# Patient Record
Sex: FEMALE | Race: BLACK OR AFRICAN AMERICAN | NOT HISPANIC OR LATINO | Employment: FULL TIME | URBAN - METROPOLITAN AREA
[De-identification: names, ages, dates, MRNs, and addresses within clinical notes are randomized per-mention and may not be internally consistent; named-entity substitution may affect disease eponyms.]

---

## 2018-04-12 ENCOUNTER — HISTORICAL (OUTPATIENT)
Dept: INTERNAL MEDICINE | Facility: CLINIC | Age: 48
End: 2018-04-12

## 2018-04-12 LAB
ABS NEUT (OLG): 3.93 X10(3)/MCL (ref 2.1–9.2)
ALBUMIN SERPL-MCNC: 3.9 GM/DL (ref 3.4–5)
ALBUMIN/GLOB SERPL: 1 RATIO (ref 1–2)
ALP SERPL-CCNC: 72 UNIT/L (ref 45–117)
ALT SERPL-CCNC: 13 UNIT/L (ref 12–78)
APPEARANCE, UA: ABNORMAL
AST SERPL-CCNC: 16 UNIT/L (ref 15–37)
BACTERIA #/AREA URNS AUTO: ABNORMAL /[HPF]
BASOPHILS # BLD AUTO: 0.03 X10(3)/MCL
BASOPHILS NFR BLD AUTO: 0 %
BILIRUB SERPL-MCNC: 0.3 MG/DL (ref 0.2–1)
BILIRUB UR QL STRIP: NEGATIVE
BILIRUBIN DIRECT+TOT PNL SERPL-MCNC: 0.1 MG/DL
BILIRUBIN DIRECT+TOT PNL SERPL-MCNC: 0.2 MG/DL
BUN SERPL-MCNC: 11 MG/DL (ref 7–18)
CALCIUM SERPL-MCNC: 9 MG/DL (ref 8.5–10.1)
CHLORIDE SERPL-SCNC: 104 MMOL/L (ref 98–107)
CHOLEST SERPL-MCNC: 161 MG/DL
CHOLEST/HDLC SERPL: 3.1 {RATIO} (ref 0–4.4)
CO2 SERPL-SCNC: 28 MMOL/L (ref 21–32)
COLOR UR: YELLOW
CREAT SERPL-MCNC: 0.7 MG/DL (ref 0.6–1.3)
EOSINOPHIL # BLD AUTO: 0.17 X10(3)/MCL
EOSINOPHIL NFR BLD AUTO: 2 %
ERYTHROCYTE [DISTWIDTH] IN BLOOD BY AUTOMATED COUNT: 19.9 % (ref 11.5–14.5)
EST. AVERAGE GLUCOSE BLD GHB EST-MCNC: 117 MG/DL
GLOBULIN SER-MCNC: 4.8 GM/ML (ref 2.3–3.5)
GLUCOSE (UA): NORMAL
GLUCOSE SERPL-MCNC: 102 MG/DL (ref 74–106)
HAV IGM SERPL QL IA: NONREACTIVE
HBA1C MFR BLD: 5.7 % (ref 4.2–6.3)
HBV CORE IGM SERPL QL IA: NONREACTIVE
HBV SURFACE AG SERPL QL IA: NEGATIVE
HCT VFR BLD AUTO: 31.9 % (ref 35–46)
HCV AB SERPL QL IA: NONREACTIVE
HDLC SERPL-MCNC: 52 MG/DL
HGB BLD-MCNC: 10 GM/DL (ref 12–16)
HGB UR QL STRIP: 0.06 MG/DL
HIV 1+2 AB+HIV1 P24 AG SERPL QL IA: NONREACTIVE
HYALINE CASTS #/AREA URNS LPF: ABNORMAL /[LPF]
IMM GRANULOCYTES # BLD AUTO: 0.03 10*3/UL
IMM GRANULOCYTES NFR BLD AUTO: 0 %
KETONES UR QL STRIP: NEGATIVE
LDLC SERPL CALC-MCNC: 95 MG/DL (ref 0–130)
LEUKOCYTE ESTERASE UR QL STRIP: 500 LEU/UL
LYMPHOCYTES # BLD AUTO: 2.12 X10(3)/MCL
LYMPHOCYTES NFR BLD AUTO: 31 % (ref 13–40)
MCH RBC QN AUTO: 23.7 PG (ref 26–34)
MCHC RBC AUTO-ENTMCNC: 31.3 GM/DL (ref 31–37)
MCV RBC AUTO: 75.6 FL (ref 80–100)
MONOCYTES # BLD AUTO: 0.57 X10(3)/MCL
MONOCYTES NFR BLD AUTO: 8 % (ref 4–12)
NEUTROPHILS # BLD AUTO: 3.93 X10(3)/MCL
NEUTROPHILS NFR BLD AUTO: 58 X10(3)/MCL
NITRITE UR QL STRIP: NEGATIVE
PH UR STRIP: 6 [PH] (ref 4.5–8)
PLATELET # BLD AUTO: 458 X10(3)/MCL (ref 130–400)
PMV BLD AUTO: 8.8 FL (ref 7.4–10.4)
POTASSIUM SERPL-SCNC: 3.8 MMOL/L (ref 3.5–5.1)
PROT SERPL-MCNC: 8.7 GM/DL (ref 6.4–8.2)
PROT UR QL STRIP: 10 MG/DL
RBC # BLD AUTO: 4.22 X10(6)/MCL (ref 4–5.2)
RBC #/AREA URNS AUTO: ABNORMAL /[HPF]
SODIUM SERPL-SCNC: 137 MMOL/L (ref 136–145)
SP GR UR STRIP: 1.01 (ref 1–1.03)
SQUAMOUS #/AREA URNS LPF: >100 /[LPF]
TRIGL SERPL-MCNC: 70 MG/DL
TSH SERPL-ACNC: 1.58 MIU/L (ref 0.36–3.74)
UROBILINOGEN UR STRIP-ACNC: NORMAL
VLDLC SERPL CALC-MCNC: 14 MG/DL
WBC # SPEC AUTO: 6.8 X10(3)/MCL (ref 4.5–11)
WBC #/AREA URNS AUTO: ABNORMAL /HPF

## 2018-04-19 ENCOUNTER — HISTORICAL (OUTPATIENT)
Dept: INTERNAL MEDICINE | Facility: CLINIC | Age: 48
End: 2018-04-19

## 2018-04-26 ENCOUNTER — HISTORICAL (OUTPATIENT)
Dept: INTERNAL MEDICINE | Facility: CLINIC | Age: 48
End: 2018-04-26

## 2018-06-27 ENCOUNTER — HISTORICAL (OUTPATIENT)
Dept: ADMINISTRATIVE | Facility: HOSPITAL | Age: 48
End: 2018-06-27

## 2018-06-27 LAB
ABS NEUT (OLG): 3.83 X10(3)/MCL (ref 2.1–9.2)
APPEARANCE, UA: ABNORMAL
BACTERIA #/AREA URNS AUTO: ABNORMAL /[HPF]
BASOPHILS # BLD AUTO: 0.02 X10(3)/MCL
BASOPHILS NFR BLD AUTO: 0 %
BILIRUB UR QL STRIP: NEGATIVE
COLOR UR: YELLOW
EOSINOPHIL # BLD AUTO: 0.13 X10(3)/MCL
EOSINOPHIL NFR BLD AUTO: 2 %
ERYTHROCYTE [DISTWIDTH] IN BLOOD BY AUTOMATED COUNT: 17.7 % (ref 11.5–14.5)
FERRITIN SERPL-MCNC: 24.1 NG/ML (ref 10–150)
GLUCOSE (UA): NORMAL
HCT VFR BLD AUTO: 33.9 % (ref 35–46)
HGB BLD-MCNC: 10.8 GM/DL (ref 12–16)
HGB UR QL STRIP: 0.2 MG/DL
HYALINE CASTS #/AREA URNS LPF: ABNORMAL /[LPF]
IMM GRANULOCYTES # BLD AUTO: 0.02 10*3/UL
IMM GRANULOCYTES NFR BLD AUTO: 0 %
IRON SATN MFR SERPL: 8.6 % (ref 15–50)
IRON SERPL-MCNC: 28 MCG/DL (ref 50–170)
KETONES UR QL STRIP: NEGATIVE
LEUKOCYTE ESTERASE UR QL STRIP: NEGATIVE
LYMPHOCYTES # BLD AUTO: 1.86 X10(3)/MCL
LYMPHOCYTES NFR BLD AUTO: 29 % (ref 13–40)
MCH RBC QN AUTO: 26.3 PG (ref 26–34)
MCHC RBC AUTO-ENTMCNC: 31.9 GM/DL (ref 31–37)
MCV RBC AUTO: 82.5 FL (ref 80–100)
MONOCYTES # BLD AUTO: 0.64 X10(3)/MCL
MONOCYTES NFR BLD AUTO: 10 % (ref 4–12)
NEUTROPHILS # BLD AUTO: 3.83 X10(3)/MCL
NEUTROPHILS NFR BLD AUTO: 59 X10(3)/MCL
NITRITE UR QL STRIP: NEGATIVE
PH UR STRIP: 5.5 [PH] (ref 4.5–8)
PLATELET # BLD AUTO: 396 X10(3)/MCL (ref 130–400)
PMV BLD AUTO: 9.1 FL (ref 7.4–10.4)
PROT SERPL-MCNC: 7.5 GM/DL
PROT UR QL STRIP: 20 MG/DL
RBC # BLD AUTO: 4.11 X10(6)/MCL (ref 4–5.2)
RBC #/AREA URNS AUTO: ABNORMAL /[HPF]
SP GR UR STRIP: 1.02 (ref 1–1.03)
SQUAMOUS #/AREA URNS LPF: >100 /[LPF]
TIBC SERPL-MCNC: 325 MCG/DL (ref 250–450)
TRANSFERRIN SERPL-MCNC: 259 MG/DL (ref 200–360)
UROBILINOGEN UR STRIP-ACNC: 2 MG/DL
WBC # SPEC AUTO: 6.5 X10(3)/MCL (ref 4.5–11)
WBC #/AREA URNS AUTO: ABNORMAL /HPF

## 2018-07-11 ENCOUNTER — HISTORICAL (OUTPATIENT)
Dept: RADIOLOGY | Facility: HOSPITAL | Age: 48
End: 2018-07-11

## 2018-07-11 LAB
BUN SERPL-MCNC: 10 MG/DL (ref 7–18)
CALCIUM SERPL-MCNC: 8.8 MG/DL (ref 8.5–10.1)
CHLORIDE SERPL-SCNC: 104 MMOL/L (ref 98–107)
CO2 SERPL-SCNC: 27 MMOL/L (ref 21–32)
CREAT SERPL-MCNC: 0.6 MG/DL (ref 0.6–1.3)
CREAT/UREA NIT SERPL: 17
GLUCOSE SERPL-MCNC: 119 MG/DL (ref 74–106)
POTASSIUM SERPL-SCNC: 3.6 MMOL/L (ref 3.5–5.1)
SODIUM SERPL-SCNC: 140 MMOL/L (ref 136–145)

## 2018-09-27 ENCOUNTER — HISTORICAL (OUTPATIENT)
Dept: ADMINISTRATIVE | Facility: HOSPITAL | Age: 48
End: 2018-09-27

## 2018-09-27 LAB
ABS NEUT (OLG): 4 X10(3)/MCL (ref 2.1–9.2)
ALBUMIN SERPL-MCNC: 3.8 GM/DL (ref 3.4–5)
ALBUMIN/GLOB SERPL: 1 RATIO (ref 1–2)
ALP SERPL-CCNC: 68 UNIT/L (ref 45–117)
ALT SERPL-CCNC: 14 UNIT/L (ref 12–78)
APPEARANCE, UA: CLEAR
AST SERPL-CCNC: 14 UNIT/L (ref 15–37)
BACTERIA #/AREA URNS AUTO: ABNORMAL /[HPF]
BASOPHILS # BLD AUTO: 0.02 X10(3)/MCL
BASOPHILS NFR BLD AUTO: 0 %
BILIRUB SERPL-MCNC: 0.3 MG/DL (ref 0.2–1)
BILIRUB UR QL STRIP: NEGATIVE
BILIRUBIN DIRECT+TOT PNL SERPL-MCNC: 0.1 MG/DL
BILIRUBIN DIRECT+TOT PNL SERPL-MCNC: 0.2 MG/DL
BUN SERPL-MCNC: 8 MG/DL (ref 7–18)
CALCIUM SERPL-MCNC: 8.8 MG/DL (ref 8.5–10.1)
CHLORIDE SERPL-SCNC: 105 MMOL/L (ref 98–107)
CO2 SERPL-SCNC: 28 MMOL/L (ref 21–32)
COLOR UR: YELLOW
CREAT SERPL-MCNC: 0.6 MG/DL (ref 0.6–1.3)
CREAT UR-MCNC: 307 MG/DL
EOSINOPHIL # BLD AUTO: 0.1 X10(3)/MCL
EOSINOPHIL NFR BLD AUTO: 2 %
ERYTHROCYTE [DISTWIDTH] IN BLOOD BY AUTOMATED COUNT: 15 % (ref 11.5–14.5)
GLOBULIN SER-MCNC: 4.4 GM/ML (ref 2.3–3.5)
GLUCOSE (UA): NORMAL
GLUCOSE SERPL-MCNC: 104 MG/DL (ref 74–106)
HCT VFR BLD AUTO: 33.7 % (ref 35–46)
HGB BLD-MCNC: 11.1 GM/DL (ref 12–16)
HGB UR QL STRIP: >1 MG/DL
HYALINE CASTS #/AREA URNS LPF: ABNORMAL /[LPF]
IMM GRANULOCYTES # BLD AUTO: 0.02 10*3/UL
IMM GRANULOCYTES NFR BLD AUTO: 0 %
KETONES UR QL STRIP: ABNORMAL
LEUKOCYTE ESTERASE UR QL STRIP: NEGATIVE
LYMPHOCYTES # BLD AUTO: 1.69 X10(3)/MCL
LYMPHOCYTES NFR BLD AUTO: 26 % (ref 13–40)
MCH RBC QN AUTO: 27 PG (ref 26–34)
MCHC RBC AUTO-ENTMCNC: 32.9 GM/DL (ref 31–37)
MCV RBC AUTO: 82 FL (ref 80–100)
MICROALBUMIN UR-MCNC: 28 MG/L (ref 0–19)
MICROALBUMIN/CREAT RATIO PNL UR: 9.1 MCG/MG CR (ref 0–29)
MONOCYTES # BLD AUTO: 0.61 X10(3)/MCL
MONOCYTES NFR BLD AUTO: 10 % (ref 4–12)
MUCOUS THREADS URNS QL MICRO: ABNORMAL
NEUTROPHILS # BLD AUTO: 4 X10(3)/MCL
NEUTROPHILS NFR BLD AUTO: 62 X10(3)/MCL
NITRITE UR QL STRIP: NEGATIVE
PH UR STRIP: 6 [PH] (ref 4.5–8)
PLATELET # BLD AUTO: 467 X10(3)/MCL (ref 130–400)
PMV BLD AUTO: 9.3 FL (ref 7.4–10.4)
POTASSIUM SERPL-SCNC: 3.4 MMOL/L (ref 3.5–5.1)
PROT SERPL-MCNC: 8.2 GM/DL (ref 6.4–8.2)
PROT UR QL STRIP: 30 MG/DL
RBC # BLD AUTO: 4.11 X10(6)/MCL (ref 4–5.2)
RBC #/AREA URNS AUTO: >=100 /[HPF]
SODIUM SERPL-SCNC: 140 MMOL/L (ref 136–145)
SP GR UR STRIP: 1.02 (ref 1–1.03)
SQUAMOUS #/AREA URNS LPF: >100 /[LPF]
UROBILINOGEN UR STRIP-ACNC: 3 MG/DL
WBC # SPEC AUTO: 6.4 X10(3)/MCL (ref 4.5–11)
WBC #/AREA URNS AUTO: ABNORMAL /HPF

## 2018-10-08 ENCOUNTER — HISTORICAL (OUTPATIENT)
Dept: RADIOLOGY | Facility: HOSPITAL | Age: 48
End: 2018-10-08

## 2018-10-08 LAB
APPEARANCE, UA: CLEAR
BACTERIA #/AREA URNS AUTO: ABNORMAL /[HPF]
BILIRUB UR QL STRIP: NEGATIVE
COLOR UR: COLORLESS
GLUCOSE (UA): NORMAL
HGB UR QL STRIP: NEGATIVE
HYALINE CASTS #/AREA URNS LPF: ABNORMAL /[LPF]
KETONES UR QL STRIP: NEGATIVE
LEUKOCYTE ESTERASE UR QL STRIP: NEGATIVE
NITRITE UR QL STRIP: NEGATIVE
PH UR STRIP: 7 [PH] (ref 4.5–8)
PROT UR QL STRIP: NEGATIVE
RBC #/AREA URNS AUTO: ABNORMAL /[HPF]
SP GR UR STRIP: 1 (ref 1–1.03)
SQUAMOUS #/AREA URNS LPF: ABNORMAL /[LPF]
UROBILINOGEN UR STRIP-ACNC: NORMAL
WBC #/AREA URNS AUTO: ABNORMAL /HPF

## 2019-02-11 LAB
HIGH RISK HPV 16 (PRECISION): NEGATIVE
HIGH RISK HPV 18/45 (PRECISION): NEGATIVE
PAP RECOMMENDATION EXT: NORMAL
PAP SMEAR: NORMAL

## 2019-04-24 ENCOUNTER — HISTORICAL (OUTPATIENT)
Dept: INTERNAL MEDICINE | Facility: CLINIC | Age: 49
End: 2019-04-24

## 2019-04-24 ENCOUNTER — HISTORICAL (OUTPATIENT)
Dept: ADMINISTRATIVE | Facility: HOSPITAL | Age: 49
End: 2019-04-24

## 2019-05-07 ENCOUNTER — HISTORICAL (OUTPATIENT)
Dept: RADIOLOGY | Facility: HOSPITAL | Age: 49
End: 2019-05-07

## 2019-08-08 ENCOUNTER — HISTORICAL (OUTPATIENT)
Dept: INTERNAL MEDICINE | Facility: CLINIC | Age: 49
End: 2019-08-08

## 2020-01-07 ENCOUNTER — HISTORICAL (OUTPATIENT)
Dept: ADMINISTRATIVE | Facility: HOSPITAL | Age: 50
End: 2020-01-07

## 2020-10-27 ENCOUNTER — HISTORICAL (OUTPATIENT)
Dept: RADIOLOGY | Facility: HOSPITAL | Age: 50
End: 2020-10-27

## 2020-11-10 ENCOUNTER — HISTORICAL (OUTPATIENT)
Dept: INTERNAL MEDICINE | Facility: CLINIC | Age: 50
End: 2020-11-10

## 2021-01-13 ENCOUNTER — HISTORICAL (OUTPATIENT)
Dept: RADIOLOGY | Facility: HOSPITAL | Age: 51
End: 2021-01-13

## 2021-08-09 ENCOUNTER — HISTORICAL (OUTPATIENT)
Dept: INTERNAL MEDICINE | Facility: CLINIC | Age: 51
End: 2021-08-09

## 2021-08-11 ENCOUNTER — HISTORICAL (OUTPATIENT)
Dept: INTERNAL MEDICINE | Facility: CLINIC | Age: 51
End: 2021-08-11

## 2021-08-27 ENCOUNTER — HISTORICAL (OUTPATIENT)
Dept: RADIOLOGY | Facility: HOSPITAL | Age: 51
End: 2021-08-27

## 2021-12-29 ENCOUNTER — HISTORICAL (OUTPATIENT)
Dept: ADMINISTRATIVE | Facility: HOSPITAL | Age: 51
End: 2021-12-29

## 2022-01-19 ENCOUNTER — HISTORICAL (OUTPATIENT)
Dept: RADIOLOGY | Facility: HOSPITAL | Age: 52
End: 2022-01-19

## 2022-01-28 ENCOUNTER — HISTORICAL (OUTPATIENT)
Dept: ADMINISTRATIVE | Facility: HOSPITAL | Age: 52
End: 2022-01-28

## 2022-01-28 ENCOUNTER — HISTORICAL (OUTPATIENT)
Dept: RADIOLOGY | Facility: HOSPITAL | Age: 52
End: 2022-01-28

## 2022-03-14 ENCOUNTER — OFFICE VISIT (OUTPATIENT)
Dept: NEUROSURGERY | Facility: CLINIC | Age: 52
End: 2022-03-14
Payer: MEDICAID

## 2022-03-14 VITALS
TEMPERATURE: 98 F | HEIGHT: 62 IN | WEIGHT: 179.13 LBS | DIASTOLIC BLOOD PRESSURE: 84 MMHG | SYSTOLIC BLOOD PRESSURE: 132 MMHG | HEART RATE: 84 BPM | BODY MASS INDEX: 32.96 KG/M2

## 2022-03-14 DIAGNOSIS — M51.37 DDD (DEGENERATIVE DISC DISEASE), LUMBOSACRAL: ICD-10-CM

## 2022-03-14 DIAGNOSIS — M47.816 SPONDYLOSIS OF LUMBAR SPINE: ICD-10-CM

## 2022-03-14 DIAGNOSIS — M54.50 DORSALGIA OF LUMBAR REGION: Primary | ICD-10-CM

## 2022-03-14 PROCEDURE — 3008F BODY MASS INDEX DOCD: CPT | Mod: CPTII,,, | Performed by: STUDENT IN AN ORGANIZED HEALTH CARE EDUCATION/TRAINING PROGRAM

## 2022-03-14 PROCEDURE — 3075F PR MOST RECENT SYSTOLIC BLOOD PRESS GE 130-139MM HG: ICD-10-PCS | Mod: CPTII,,, | Performed by: STUDENT IN AN ORGANIZED HEALTH CARE EDUCATION/TRAINING PROGRAM

## 2022-03-14 PROCEDURE — 99999 PR PBB SHADOW E&M-EST. PATIENT-LVL III: ICD-10-PCS | Mod: PBBFAC,,, | Performed by: STUDENT IN AN ORGANIZED HEALTH CARE EDUCATION/TRAINING PROGRAM

## 2022-03-14 PROCEDURE — 1159F MED LIST DOCD IN RCRD: CPT | Mod: CPTII,,, | Performed by: STUDENT IN AN ORGANIZED HEALTH CARE EDUCATION/TRAINING PROGRAM

## 2022-03-14 PROCEDURE — 3008F PR BODY MASS INDEX (BMI) DOCUMENTED: ICD-10-PCS | Mod: CPTII,,, | Performed by: STUDENT IN AN ORGANIZED HEALTH CARE EDUCATION/TRAINING PROGRAM

## 2022-03-14 PROCEDURE — 1160F RVW MEDS BY RX/DR IN RCRD: CPT | Mod: CPTII,,, | Performed by: STUDENT IN AN ORGANIZED HEALTH CARE EDUCATION/TRAINING PROGRAM

## 2022-03-14 PROCEDURE — 3075F SYST BP GE 130 - 139MM HG: CPT | Mod: CPTII,,, | Performed by: STUDENT IN AN ORGANIZED HEALTH CARE EDUCATION/TRAINING PROGRAM

## 2022-03-14 PROCEDURE — 1160F PR REVIEW ALL MEDS BY PRESCRIBER/CLIN PHARMACIST DOCUMENTED: ICD-10-PCS | Mod: CPTII,,, | Performed by: STUDENT IN AN ORGANIZED HEALTH CARE EDUCATION/TRAINING PROGRAM

## 2022-03-14 PROCEDURE — 1159F PR MEDICATION LIST DOCUMENTED IN MEDICAL RECORD: ICD-10-PCS | Mod: CPTII,,, | Performed by: STUDENT IN AN ORGANIZED HEALTH CARE EDUCATION/TRAINING PROGRAM

## 2022-03-14 PROCEDURE — 3079F DIAST BP 80-89 MM HG: CPT | Mod: CPTII,,, | Performed by: STUDENT IN AN ORGANIZED HEALTH CARE EDUCATION/TRAINING PROGRAM

## 2022-03-14 PROCEDURE — 99203 PR OFFICE/OUTPT VISIT, NEW, LEVL III, 30-44 MIN: ICD-10-PCS | Mod: S$PBB,,, | Performed by: STUDENT IN AN ORGANIZED HEALTH CARE EDUCATION/TRAINING PROGRAM

## 2022-03-14 PROCEDURE — 99213 OFFICE O/P EST LOW 20 MIN: CPT | Mod: PBBFAC | Performed by: STUDENT IN AN ORGANIZED HEALTH CARE EDUCATION/TRAINING PROGRAM

## 2022-03-14 PROCEDURE — 99999 PR PBB SHADOW E&M-EST. PATIENT-LVL III: CPT | Mod: PBBFAC,,, | Performed by: STUDENT IN AN ORGANIZED HEALTH CARE EDUCATION/TRAINING PROGRAM

## 2022-03-14 PROCEDURE — 3079F PR MOST RECENT DIASTOLIC BLOOD PRESSURE 80-89 MM HG: ICD-10-PCS | Mod: CPTII,,, | Performed by: STUDENT IN AN ORGANIZED HEALTH CARE EDUCATION/TRAINING PROGRAM

## 2022-03-14 PROCEDURE — 99203 OFFICE O/P NEW LOW 30 MIN: CPT | Mod: S$PBB,,, | Performed by: STUDENT IN AN ORGANIZED HEALTH CARE EDUCATION/TRAINING PROGRAM

## 2022-03-14 RX ORDER — FERROUS SULFATE 325(65) MG
325 TABLET, DELAYED RELEASE (ENTERIC COATED) ORAL
COMMUNITY
Start: 2021-08-10 | End: 2022-05-10 | Stop reason: SDUPTHER

## 2022-03-14 RX ORDER — BUSPIRONE HYDROCHLORIDE 7.5 MG/1
7.5 TABLET ORAL
COMMUNITY
Start: 2022-02-10 | End: 2022-05-10

## 2022-03-14 RX ORDER — AMLODIPINE BESYLATE 5 MG/1
5 TABLET ORAL
COMMUNITY
Start: 2021-08-10 | End: 2022-08-10 | Stop reason: SDUPTHER

## 2022-03-14 RX ORDER — ACETAMINOPHEN 500 MG
50 TABLET ORAL
COMMUNITY
Start: 2021-08-10 | End: 2022-08-10 | Stop reason: SDUPTHER

## 2022-03-14 RX ORDER — METHOCARBAMOL 500 MG/1
1000 TABLET, FILM COATED ORAL 4 TIMES DAILY PRN
COMMUNITY
Start: 2022-02-10 | End: 2022-05-10 | Stop reason: SDUPTHER

## 2022-03-14 RX ORDER — MELOXICAM 15 MG/1
15 TABLET ORAL
COMMUNITY
Start: 2022-02-10 | End: 2022-05-10 | Stop reason: SDUPTHER

## 2022-03-14 RX ORDER — HYDROXYZINE PAMOATE 25 MG/1
25 CAPSULE ORAL 3 TIMES DAILY PRN
COMMUNITY
Start: 2022-03-08 | End: 2022-05-10 | Stop reason: SDUPTHER

## 2022-03-14 NOTE — PROGRESS NOTES
Neurosurgery  History & Physical    SUBJECTIVE:     Chief Complaint: progressive back pain    History of Present Illness:  Tova Stack is a 51 year old female with acute on chronic lower back pain that radiates to the right leg, sometimes to the knee and occasionally to the foot along the outside of the leg.  Sometimes feels the leg give out when walking. Sometimes can go days to weeks without feeling severe and sharp pain.  She does have pain in the back daily that is described as sore and aching.  Standing for hours and walking long distances exacerbate burning pain.  Sitting for long periods & lying down makes her feel achy & sore.  Also has bilateral knee & hip pain with prior right knee swelling and pain.  She has tried physical therapy with some benefit (completed 2 months ago).  Never seen a chiropractor or had injections.  Currently taking robaxin with minimal benefit and occasionally mobic.     Review of patient's allergies indicates:  No Known Allergies    Current Outpatient Medications   Medication Sig Dispense Refill    amLODIPine (NORVASC) 5 MG tablet Take 5 mg by mouth.      busPIRone (BUSPAR) 7.5 MG tablet Take 7.5 mg by mouth.      cholecalciferol, vitamin D3, (VITAMIN D3) 50 mcg (2,000 unit) Cap Take 50 mcg by mouth.      ferrous sulfate 325 (65 FE) MG EC tablet Take 325 mg by mouth.      meloxicam (MOBIC) 15 MG tablet Take 15 mg by mouth.      hydrOXYzine pamoate (VISTARIL) 25 MG Cap Take 25 mg by mouth 3 (three) times daily as needed.      methocarbamoL (ROBAXIN) 500 MG Tab Take 1,000 mg by mouth 4 (four) times daily as needed.       No current facility-administered medications for this visit.       Past Medical History:   Diagnosis Date    Hypertension      Past Surgical History:   Procedure Laterality Date     SECTION      4 total     Family History    None       Social History     Socioeconomic History    Marital status: Single   Tobacco Use    Smoking status: Former  "Smoker     Types: Cigarettes    Smokeless tobacco: Former User     Quit date: 1/1/2019   Substance and Sexual Activity    Alcohol use: Never    Drug use: Yes     Types: "Crack" cocaine     Comment: 1995    Sexual activity: Yes     Partners: Male       Review of Systems   Musculoskeletal: Positive for arthralgias, back pain and joint swelling.   All other systems reviewed and are negative.      OBJECTIVE:     Vital Signs  Temp: 97.6 °F (36.4 °C)  Pulse: 84  BP: 132/84  Pain Score:   7  Height: 5' 2" (157.5 cm)  Weight: 81.3 kg (179 lb 2 oz)  Body mass index is 32.76 kg/m².      Physical Exam:  Nursing note and vitals reviewed.    General: well developed, well nourished, no distress.   Pulmonary: no signs of respiratory distress, comfortable appearing on room air  Skin: Skin is warm, dry and intact.  Extremities: no edema, intact distal pulses    Neuro:  Mental Status: Alert and oriented. Oriented x 4  Language/language: No aphasia. No dysarthria.  Cranial nerves: PERRL, EOMI, face symmetric  Sensory: intact to light touch throughout  Motor Strength:  Strength  Deltoids Triceps Biceps Wrist Extension Wrist Flexion Hand    Upper: R 5/5 5/5 5/5 5/5 5/5 5/5    L 5/5 5/5 5/5 5/5 5/5 5/5     Iliopsoas Quadriceps Knee  Flexion Tibialis  anterior Gastro- cnemius EHL   Lower: R 5/5 5/5 5/5 5/5 5/5 5/5    L 5/5 5/5 5/5 5/5 5/5 5/5   Reflexes: clonus negative bilateral.  Gait stable, fluid. No difficulty with tandem gait: Able to walk on heels & toes  Spine: lumbar ROM limited with flexion & lateral bend. +TTP lumbar spine      Diagnostic Results:  Outside MRI was personally reviewed left lateral disc herniation at L5-S1 with some foraminal stenosis    ASSESSMENT/PLAN:   52 yo female with chronic low back pain radiating to the right leg.  No compressive pathology appreciated on imaging that would cause her current symptoms but will review again after imaging loaded into our system.  Pt will have CT L spine put on disc " and sent to me for review.  Also need outside radiology reports.  Will plan to follow up next Monday to review results.          Note dictated with voice recognition software, please excuse any grammatical errors.

## 2022-03-23 ENCOUNTER — HISTORICAL (OUTPATIENT)
Dept: RADIOLOGY | Facility: HOSPITAL | Age: 52
End: 2022-03-23

## 2022-04-07 ENCOUNTER — HISTORICAL (OUTPATIENT)
Dept: ADMINISTRATIVE | Facility: HOSPITAL | Age: 52
End: 2022-04-07
Payer: MEDICAID

## 2022-04-23 VITALS
WEIGHT: 176.13 LBS | OXYGEN SATURATION: 100 % | DIASTOLIC BLOOD PRESSURE: 82 MMHG | HEIGHT: 62 IN | SYSTOLIC BLOOD PRESSURE: 127 MMHG | BODY MASS INDEX: 32.41 KG/M2

## 2022-05-02 NOTE — HISTORICAL [facility] CERNER
This is a historical note converted from [facility]. Formatting and pictures may have been removed.  Please reference [facility] for original formatting and attached multimedia. Chief Complaint  follow up  History of Present Illness  9/27/19: Tova is a pleasant?50 yo AAF here for follow up.?Last visit 4/24/19.?Pmhx includes HTN, IGT, anemia, lumbago, anxiety, and tobacco abuse. Today she is c/o worsening back pain. Describes as throbbing,?sharp shooting pain to L side with L leg weakness. Aggravating factors: standing for > 5 hours, lying supine. Relieving factors: massage.?Denies stretching as discussed at prior visit. Tx with Ibuprofen 800 mg without relief, just causes her to sleep. /84. Not taking hydroxyzine every day. Using prn basis; can feel when anxiety comes on and takes medication with relief. Still worries about her son who is in penitentiary. She is caring for her 3 yo grandson and her other son who is 29 yo just moved to [place]. She has realized though that she cannot let it affect her like it has before. Work is a distraction for her. 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 Still smoking, weaning down to 10 cigarettes/d. No other concerns.  ?  1/7/20: Patient here for 3 mo f/u for lumbago. Did not complete PT as ordered since last visit. She states she did not have a chance to do so. Today c/o R elbow pain. Ongoing x 1 month.?When pain?at?worst, endorses?cramp in R hand and has to pry?her hands open. Pain worse when picking up objects. R handed. Denies any injury/ trauma. Out of diclofenac. Not tx with OTC medications. Also c/o cough, productive at times with various colors of sputum from clear, yellow and green. Denies any SOB, CP, wheezing, rhinorrhea, ear pain, HA, sore throat, congestion.?Of note, recent?ED visit on 12/29/19 and 1/2/20 for cough and flu like symptoms. Provided with script for Tamiflu on 12/29/19 which she completed. Did not take cough syrup as  prescribed d/t fearful of s/e of drowsiness. Last fever > 1 week ago.  hospitalized at this time for COPD and flu. Patient wearing mask today. Would like prescription for BP monitor. Completed labs this morning for anemia; pending results.?No other concerns.  Review of Systems  Constitutional: negative except as stated in HPI  Eye: negative except as stated in HPI  ENMT: negative except as stated in HPI  Respiratory: negative except as stated in HPI  Cardiovascular: negative except as stated in HPI  Gastrointestinal: negative except as stated in HPI  Genitourinary: negative except as stated in HPI  Hema/Lymph: negative except as stated in HPI  Endocrine: negative except as stated in HPI  Immunologic: negative except as stated in HPI  Musculoskeletal: negative except as stated in HPI  Integumentary: negative except as stated in HPI  Neurologic: negative except as stated in HPI  ?   All Other ROS_ ?negative except as stated in HPI  Physical Exam  Vitals & Measurements  T:?36.9? ?C (Oral)? HR:?65(Peripheral)? RR:?16? BP:?124/76?  HT:?157.48?cm? WT:?71.8?kg? BMI:?28.95? LMP:?11/10/2019 00:00 CST?  General: Alert and oriented. Well dressed. No acute distress.  HENT: Normocephalic. BTM intact without effusion, exudate, erythema. O/p pink and moist without exudate, erythema or ulcer; negative tonsillar hypertrophy. Nasal septum midline without edema, drainage. Negative sinus tenderness.  Neck: Supple. No LAD or tenderness.  Respiratory: Lungs are clear to auscultation, Respirations are non-labored, Breath sounds are equal, Symmetrical chest wall expansion.  Cardiovascular: Normal rate, Regular rhythm, No murmur.  Musculoskeletal:?R shoulder without palpable tenderness; FROM intact. R elbow tenderness with palpation of lateral epicondyle with minimal edema. Positive open can test. Painful adduction. R hand FROM intact without edema or tenderness.  Integumentary: Warm, Dry, Intact.  Neurologic: No focal  deficits.  Psychiatric: Calm. Appropriate mood and affect.?Judgment intact with clear thought processes.  Assessment/Plan  1.?Cough?R05  treated for flu like illness x 1 week ago, completed Tamiflu ( diagnosed with the flu and inpatient for COPD/ Flu at this time)  afebrile > 1 week  continues with c/o cough  will obtain CXR today  can treat with cough syrup as directed  any worsening s/s, notify provider  Ordered:  XR Chest 2 Views, Routine, *Est. 01/07/20 3:00:00 CST, Cough, None, Ambulatory, Rad Type, Order for future visit, Cough, Not Scheduled, *Est. 01/07/20 3:00:00 CST  ?  2.?Tendinitis of elbow?M77.8  ongoing x 1 month; denies any injury/ trauma  R handed  rest for about 1 week, apply?ice and compression; can obtain OTC tennis elbow splint to apply to area  will treat with Medrol dose lillie as directed and diclofenac 50 mg BID prn pain relief, with food and avoid other NSAIDs  take with OTC Pepcid while on oral steroids and NSAIDs  Ordered:  diclofenac, 50 mg = 1 tab(s), Oral, BID, PRN PRN as needed for pain, with food; avoid other NSAIDs, # 28 tab(s), 0 Refill(s), Pharmacy: [id number], 157.48, cm, Height/Length Dosing, 01/07/20 9:37:00 CST, 71.8, kg, Weight Dosing, 01/07/20 9:37:00 CST  famotidine, 20 mg = 1 tab(s), Oral, BID, avoid eating and drinking for 10 minutes after each dose, # 60 tab(s), 0 Refill(s), Pharmacy: [id number], 157.48, cm, Height/Length Dosing, 01/07/20 9:37:00 CST, 71.8, kg, Weight Dosing, 01/07/20 9:37:00 CST  methylPREDNISolone, = 1 packet(s), Oral, As Directed, as directed on package labeling, X 6 day(s), # 21 tab(s), 0 Refill(s), Pharmacy: [id number], 157.48, cm, Height/Length Dosing, 01/07/20 9:37:00 CST, 71.8, kg, Weight Dosing, 01/07/20 9:37:00 CST  ?  3.?Lumbago?M54.5  9/27/19  Encouraged?daily stretching?and exercise, hand out provided today and encouraged to engage in 2-3 x d  Encouraged?ice/ heat therapy as tolerated for pain relief  Rx Ibuprofen  causes s/e of sleepiness?  XR lumbar spine 4/24/19: ?mild degenerative narrowing of the L5-S1 disc space and small anterior hypertrophic spurring.?There is also mild facets arthropathy.  External Referral for PT at location of patients choice  Rx Baclofen 10 mg (0.5 tab) TID prn muscle spasm/ pain  needs labs prior to prescribing anti-inflammatory for pain relief  if worsening s/s, notify provider for sooner appt  RTC 3 mo  ?  1/7/20  no change; did not engage in PT as ordered at last visit  Ordered:  Clinic Follow up, *Est. 07/07/20 9:20:00 CDT, Order for future visit, [facility]  ?  4.?Anemia?D64.9  labs completed this morning and returned after patient discharged with improvement  1/7/20: RBC 4.22, H/H 10.4/33.4, Plt 413, MCV 79.1, MCH 24.6, RDW 18; Iron profile with iron level 30, transferrin 307, TIBC 373, iron sat 8, ferritin 14.1  prior labs 8/8/19: Ferritin 11; 9/27/19 H/H 9.8/31.5  Ordered:  CBC w/ Auto Diff, Routine collect, *Est. 07/07/20 3:00:00 CDT, Blood, Order for future visit, *Est. Stop date 07/07/20 3:00:00 CDT, Lab Collect, Anemia, 01/07/20 10:03:00 CST  Clinic Follow up, *Est. 07/07/20 9:20:00 CDT, Order for future visit, [facility]  ?  Orders:  Misc Prescription, Automatic Blood Pressure Monitor, See Instructions, Use Daily to check blood pressure. Keep log Dx: I10, # 1 EA, 0 Refill(s), Pharmacy: [id number], 157.48, cm, Height/Length Dosing, 01/07/20 9:37:00 CST, 71.8, kg, Weight Dosing, 01/07/20 9:37...  RTC in 6 mo with labs prior to appt  If at any time condition worsens or experience new symptoms/ concerns, please call clinic for sooner appointment or go to ED/UCC.  Referrals  Clinic Follow up, *Est. 07/07/20 9:20:00 CDT, Order for future visit, [facility]   Problem List/Past Medical History  Ongoing  Anemia  Anxiety  Hematuria  HTN - Hypertension  Lumbago  Obesity  Tobacco  user  Historical  Pregnant  Pregnant  Pregnant  Pregnant  Procedure/Surgical History  bilateral tubal ligation  C SEC X 4   Medications  amlodipine 5 mg oral tablet, 5 mg= 1 tab(s), Oral, Daily, 1 refills  Automatic Blood Pressure Monitor, See Instructions  baclofen 10 mg oral tablet, 10 mg= 1 tab(s), Oral, TID,? ?Not Taking, Completed Rx: Last Dose Date/Time unknown  diclofenac sodium 50 mg oral delayed release tablet, 50 mg= 1 tab(s), Oral, BID, PRN  famotidine 20 mg oral tablet, 20 mg= 1 tab(s), Oral, BID  ferrous sulfate 325 mg (65 mg elemental iron) oral delayed release tablet, 325 mg= 1 tab(s), Oral, TID, 1 refills,? ?Still taking, not as prescribed: Last Dose Date/Time Unknown  Medrol Dosepak 4 mg oral tablet, 1 packet(s), Oral, As Directed  Promethazine DM 6.25 mg-15 mg/5 mL oral syrup, 5 mL, Oral, q6hr, PRN  Vistaril 25 mg oral capsule, 25 mg= 1 cap(s), Oral, QID, PRN  Zofran ODT 4 mg oral tablet, disintegrating, 4 mg= 1 tab(s), Oral, q8hr, PRN  Allergies  No Known Allergies  Social History  Abuse/Neglect  No, 01/07/2020  Alcohol - Denies Alcohol Use, 09/28/2014  Past, 10/02/2019  Never, 09/15/2017  Employment/School  Unemployed, Highest education level: High school., 03/26/2018  Exercise  Exercise duration: 60. Exercise frequency: 3-4 times/week. Exercise type: Walking., 12/12/2018  Home/Environment  Lives with Spouse, grand baby. Living situation: Home/Independent. Alcohol abuse in household: No. Substance abuse in household: No. Smoker in household: Yes. Feels unsafe at home: No. Safe place to go: Yes., 03/26/2018  Nutrition/Health  Regular, Good, 10/02/2019  Regular, 03/26/2018  Sexual  Sexually active: Yes. Sexually active at age 14 Years. Number of current partners 1. Number of lifetime partners 10. Sexual orientation: Heterosexual. Uses condoms: No. History of sexual abuse: No., 12/12/2018  Spiritual/Cultural  Jehovah's witness, 10/02/2019  Substance Use - Denies Substance Abuse, 09/28/2014  Never,  10/02/2019  Never, 09/15/2017  Tobacco - High Risk, 09/28/2014  Former smoker, quit more than 30 days ago, Cigarettes, N/A, 01/07/2020  Family History  Diabetes: Mother.  HEART DISEASES: Sister.  Hypertension.: Mother, Father and Sister.  Health Maintenance  Health Maintenance  ???Pending?(in the next year)  ??? ??OverDue  ??? ? ? ?Diabetes Screening due??and every?  ??? ? ? ?Smoking Cessation due??01/01/20??and every 1??year(s)  ??? ??Due?  ??? ? ? ?Alcohol Misuse Screening due??01/01/20??and every 1??year(s)  ??? ??Refused?  ??? ? ? ?Tetanus Vaccine due??01/07/20??and every 10??year(s)  ??? ??Due In Future?  ??? ? ? ?Hypertension Management-BMP not due until??09/26/20??and every 1??year(s)  ??? ? ? ?Obesity Screening not due until??01/01/21??and every 1??year(s)  ??? ? ? ?Blood Pressure Screening not due until??01/06/21??and every 1??year(s)  ??? ? ? ?Body Mass Index Check not due until??01/06/21??and every 1??year(s)  ??? ? ? ?Hypertension Management-Blood Pressure not due until??01/06/21??and every 1??year(s)  ???Satisfied?(in the past 1 year)  ??? ??Satisfied?  ??? ? ? ?ADL Screening on??01/07/20.??Satisfied by Lydia Schmitt LPN  ??? ? ? ?Blood Pressure Screening on??01/07/20.??Satisfied by Lydia Schmitt LPN  ??? ? ? ?Body Mass Index Check on??01/07/20.??Satisfied by Lydia Schmitt LPN  ??? ? ? ?Breast Cancer Screening on??05/07/19.??Satisfied by Lesly Christopher  ??? ? ? ?Cervical Cancer Screening on??02/11/19.??Satisfied by Jessica Hines  ??? ? ? ?Depression Screening on??10/02/19.??Satisfied by Ellen Wiley LPN  ??? ? ? ?Diabetes Screening on??09/27/19.??Satisfied by Yolanda Christopher  ??? ? ? ?Hypertension Management-Education on??01/07/20.??Satisfied by Shira Garcia  ??? ? ? ?Influenza Vaccine on??01/07/20.??Satisfied by Lydia Schmitt LPN  ??? ? ? ?Lipid Screening on??01/07/20.??Satisfied by Danielle Henriquez  ??? ? ? ?Obesity Screening on??01/07/20.??Satisfied by Lydia Schmitt LPN  F  ?  Lab Results  Test Name Test Result Date/Time   Iron Lvl 30 mcg/dL (Low) 01/07/2020 09:17 CST   Transferrin 307.0 mg/dL 01/07/2020 09:17 CST   TIBC 373 mcg/dL 01/07/2020 09:17 CST   Iron Sat 8.0 % (Low) 01/07/2020 09:17 CST   Ferritin Lvl 14.1 ng/mL 01/07/2020 09:17 CST   Ferritin Lvl 11.0 ng/mL 08/08/2019 09:39 CDT   Chol 154 mg/dL 01/07/2020 09:17 CST   HDL 44 mg/dL 01/07/2020 09:17 CST   Trig 87 mg/dL 01/07/2020 09:17 CST   LDL 93 mg/dL 01/07/2020 09:17 CST   Chol/HDL 3.5 01/07/2020 09:17 CST   VLDL 17 mg/dL 01/07/2020 09:17 CST   WBC 5.1 x10(3)/mcL 01/07/2020 09:17 CST   WBC 6.2 x10(3)/mcL 09/27/2019 10:13 CDT   RBC 4.22 x10(6)/mcL 01/07/2020 09:17 CST   RBC 3.99 x10(6)/mcL (Low) 09/27/2019 10:13 CDT   Hgb 10.4 gm/dL (Low) 01/07/2020 09:17 CST   Hgb 9.8 gm/dL (Low) 09/27/2019 10:13 CDT   Hct 33.4 % (Low) 01/07/2020 09:17 CST   Hct 31.5 % (Low) 09/27/2019 10:13 CDT   Platelet 413 x10(3)/mcL (High) 01/07/2020 09:17 CST   Platelet 471 x10(3)/mcL (High) 09/27/2019 10:13 CDT   MCV 79.1 fL (Low) 01/07/2020 09:17 CST   MCV 78.9 fL (Low) 09/27/2019 10:13 CDT   MCH 24.6 pg (Low) 01/07/2020 09:17 CST   MCH 24.6 pg (Low) 09/27/2019 10:13 CDT   MCHC 31.1 gm/dL 01/07/2020 09:17 CST   MCHC 31.1 gm/dL 09/27/2019 10:13 CDT   RDW 18.0 % (High) 01/07/2020 09:17 CST   RDW 17.8 % (High) 09/27/2019 10:13 CDT   MPV 9.4 fL 01/07/2020 09:17 CST   MPV 8.8 fL 09/27/2019 10:13 CDT   Abs Neut 2.38 x10(3)/mcL 01/07/2020 09:17 CST   Abs Neut 3.42 x10(3)/mcL 09/27/2019 10:13 CDT   Neutro Auto 47 % 01/07/2020 09:17 CST   Neutro Auto 55 % 09/27/2019 10:13 CDT   Lymph Auto 41 % 01/07/2020 09:17 CST   Lymph Auto 30 % 09/27/2019 10:13 CDT   Mono Auto 10 % 01/07/2020 09:17 CST   Mono Auto 11 % 09/27/2019 10:13 CDT   Eos Auto 2 % 01/07/2020 09:17 CST   Eos Auto 3 % 09/27/2019 10:13 CDT   Abs Eos 0.1 x10(3)/mcL 01/07/2020 09:17 CST   Abs Eos 0.2 x10(3)/mcL 09/27/2019 10:13 CDT   Basophil Auto 0 % 01/07/2020 09:17 CST   Basophil Auto 0 %  09/27/2019 10:13 CDT   Abs Neutro 2.38 x10(3)/mcL 01/07/2020 09:17 CST   Abs Neutro 3.42 x10(3)/mcL 09/27/2019 10:13 CDT   Abs Lymph 2.1 x10(3)/mcL 01/07/2020 09:17 CST   Abs Lymph 1.9 x10(3)/mcL 09/27/2019 10:13 CDT   Abs Mono 0.5 x10(3)/mcL 01/07/2020 09:17 CST   Abs Mono 0.7 x10(3)/mcL 09/27/2019 10:13 CDT   Abs Baso 0.0 x10(3)/mcL 01/07/2020 09:17 CST   Abs Baso 0.0 x10(3)/mcL 09/27/2019 10:13 CDT   IG% 0 % 01/07/2020 09:17 CST   IG% 0 % 09/27/2019 10:13 CDT   IG# 0.0200 01/07/2020 09:17 CST   IG# 0.0200 09/27/2019 10:13 CDT   Influ A PCR Negative UA 12/29/2019 14:26 CST   Influ B PCR Negative UA 12/29/2019 14:26 CST

## 2022-05-02 NOTE — HISTORICAL [facility] CERNER
This is a historical note converted from [employer]. Formatting and pictures may have been removed.  Please reference [employer] for original formatting and attached multimedia. Chief Complaint  Follow up Lower Left Back Pain  History of Present Illness  Tova is a 47 yo AAF presenting today for routine f/u. Former patient of SHAUNA Lara, last visit 6/27/18. Pmhx includes HTN, IGT, anemia, lumbago, anxiety, and tobacco abuse. Today she is c/o ongoing low back pain x 1 year. 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 Denies being seen after injury, reports?I wanted to keep my job and thought it would go?away.?10/10. Describes as sharp, shooting pain worse to L low back/ buttock.?Aggravating factors: standing and sitting for extended periods of time. Tx at home with Advil pm at bedtime without relief (but does help her sleep). Denies any stretching or?ice/ heat therapy. Asking for refill of anxiety medication.?States she has been emotional d/t going through a lot right now...?Further states her  recently left her (last Saturday) unexpectedly and her son has been in [facility] for a serious charge. Medication helps?anxiety; tx?once daily. Does engage in Restorationism?regularly.?Wt loss of 5# since last visit. Eating ~ 1 meal daily. Admits to decreased appetite s/t anxiety and stressors at home. BP at goal today. Compliant with medication as prescribed. Reviewed A1c 5.7% (+fhx: mom) and educated on ADA diet. Anemia tx with otc iron supplements. No other concerns.  Review of Systems  Constitutional: negative except as stated in HPI  Eye: negative except as stated in HPI  ENMT: negative except as stated in HPI  Respiratory: negative except as stated in HPI  Cardiovascular: negative except as stated in HPI  Gastrointestinal: negative except as stated in HPI  Genitourinary: negative except as stated in HPI  Hema/Lymph: negative except as stated in HPI  Endocrine: negative except  as stated in HPI  Immunologic: negative except as stated in HPI  Musculoskeletal: negative except as stated in HPI  Integumentary: negative except as stated in HPI  Neurologic: negative except as stated in HPI  ?   All Other ROS_ ?negative except as stated in HPI  Physical Exam  Vitals & Measurements  T:?36.7? ?C (Oral)? HR:?86(Peripheral)? RR:?20? BP:?121/76?  HT:?157?cm? WT:?73.4?kg?  General: Alert and oriented. Well dressed. No acute distress.  Eye: Pupils are equal, round and reactive to light, Extraocular movements are intact.  HENT: Normocephalic. BTM pearly gray without effusion, bulge or retraction. O/p pink and moist without exudate, erythema or ulcers.  Neck: Supple, Non-tender. No lymphadenopathy. No thyromegaly, firmness or nodularity.  Respiratory: Lungs are clear to auscultation, Respirations are non-labored, Breath sounds are equal, Symmetrical chest wall expansion.  Cardiovascular: Normal rate, Regular rhythm, No murmur.  Gastrointestinal: Soft, Non-tender, Non-distended, Normal bowel sounds.  Musculoskeletal: DROM with minimal tenderness to palpation of lumbar spinous process without muscle contractions. Moderate tenderness to L SI joint. SLR negative, bilateral. Strength: RLE, LLE 5/5. Steady gait.  Integumentary: Warm, Dry, Intact.  Neurologic: No focal deficits.  Psychiatric: Flat affect. Appropriate mood. Denies SI/HI.  Assessment/Plan  1.?HTN - Hypertension  /76  Educated on low sodium diet  Educated to avoid alcohol or tobacco use if applicable  Educated on health benefits of?at least 5 days/ week?of 30 minutes moderate intensity exercise (brisk walking) and 2 or more days/ week of muscle strength activities  Continue current medication regimen  Ordered:  CBC w/ Auto Diff, Routine collect, *Est. 09/27/18 3:00:00 CDT, Blood, Order for future visit, *Est. Stop date 09/27/18 3:00:00 CDT, Lab Collect, HTN - Hypertension  Anemia  Impaired glucose tolerance, 09/27/18 9:41:00 CDT  Clinic  Follow up, *Est. 01/27/19 3:00:00 CST, Order for future visit, HTN - Hypertension  Anemia  Lumbago  Impaired glucose tolerance  Anxiety  Tobacco user  Obesity, [facility]  Comprehensive Metabolic Panel, Routine collect, *Est. 09/27/18 3:00:00 CDT, Blood, Order for future visit, *Est. Stop date 09/27/18 3:00:00 CDT, Lab Collect, HTN - Hypertension  Anemia  Impaired glucose tolerance, 09/27/18 9:41:00 CDT  Microalbum/Creatinine Ratio Urine (Microalb/Creat), Routine collect, Urine, Order for future visit, *Est. 09/27/18 3:00:00 CDT, *Est. Stop date 09/27/18 3:00:00 CDT, Nurse collect, HTN - Hypertension  Anemia  Impaired glucose tolerance  Office/Outpatient Visit Level 4 Established [id number] PC, HTN - Hypertension  Anemia  Lumbago  Impaired glucose tolerance  Anxiety  Tobacco user  Obesity  Uterine fibroid  Well adult exam, [facility], 09/27/18 9:48:00 CDT  Urinalysis with Microscopic if Indicated, Routine collect, Urine, Order for future visit, *Est. 09/27/18 3:00:00 CDT, *Est. Stop date 09/27/18 3:00:00 CDT, Nurse collect, HTN - Hypertension  Anemia  Impaired glucose tolerance, 09/27/18 9:41:00 CDT  ?  2.?Anemia  CBC showed improvement with H/H wnl  continue with FS TID (rx sent)  continue with stool softener for constipation  Ordered:  ferrous sulfate, 325 mg = 1 tab(s), Oral, TID, # 270 tab(s), 4 Refill(s), Pharmacy: [id number]  CBC w/ Auto Diff, Routine collect, *Est. 09/27/18 3:00:00 CDT, Blood, Order for future visit, *Est. Stop date 09/27/18 3:00:00 CDT, Lab Collect, HTN - Hypertension  Anemia  Impaired glucose tolerance, 09/27/18 9:41:00 CDT  Clinic Follow up, *Est. 01/27/19 3:00:00 CST, Order for future visit, HTN - Hypertension  Anemia  Lumbago  Impaired glucose tolerance  Anxiety  Tobacco user  Obesity, [facility]  Comprehensive Metabolic Panel, Routine collect, *Est. 09/27/18 3:00:00 CDT, Blood, Order for future visit, *Est. Stop date 09/27/18 3:00:00 CDT, Lab  Collect, HTN - Hypertension  Anemia  Impaired glucose tolerance, 09/27/18 9:41:00 CDT  Microalbum/Creatinine Ratio Urine (Microalb/Creat), Routine collect, Urine, Order for future visit, *Est. 09/27/18 3:00:00 CDT, *Est. Stop date 09/27/18 3:00:00 CDT, Nurse collect, HTN - Hypertension  Anemia  Impaired glucose tolerance  Office/Outpatient Visit Level 4 Established 98455 PC, HTN - Hypertension  Anemia  Lumbago  Impaired glucose tolerance  Anxiety  Tobacco user  Obesity  Uterine fibroid  Well adult exam, [facility], 09/27/18 9:48:00 CDT  Urinalysis with Microscopic if Indicated, Routine collect, Urine, Order for future visit, *Est. 09/27/18 3:00:00 CDT, *Est. Stop date 09/27/18 3:00:00 CDT, Nurse collect, HTN - Hypertension  Anemia  Impaired glucose tolerance, 09/27/18 9:41:00 CDT  ?  3.?Lumbago  XR lumbar spine ordered  daily stretching  ice/ heat therapy as tolerated for pain relief and strengthening  Rx Ibuprofen 800 mg every 8 hours as needed for pain relief; take with food and avoid other NSAIDS while taking  Ordered:  ibuprofen, 800 mg = 1 tab(s), Oral, q8hr, with food or milk; avoid other NSAIDs, # 90 tab(s), 0 Refill(s), [id number]  Clinic Follow up, *Est. 01/27/19 3:00:00 CST, Order for future visit, HTN - Hypertension  Anemia  Lumbago  Impaired glucose tolerance  Anxiety  Tobacco user  Obesity, [facility]  Office/Outpatient Visit Level 4 Established 52507 PC, HTN - Hypertension  Anemia  Lumbago  Impaired glucose tolerance  Anxiety  Tobacco user  Obesity  Uterine fibroid  Well adult exam, [facility], 09/27/18 9:48:00 CDT  XR Spine Lumbar 2 or 3 Views, Routine, *Est. 09/27/18 3:00:00 CDT, Back Pain, None, Ambulatory, Rad Type, Order for future visit, Lumbago, Not Scheduled, *Est. 09/27/18 3:00:00 CDT  ?  4.?Impaired glucose tolerance  A1C?5.7%  Educated on ADA diet:  1. Avoid/ decrease high carbohydrate foods (rice, pasta, bread, candy, sweets, carbonated  beverages)  Educated on health benefits of?at least 5 days/ week?of 30 minutes moderate intensity exercise (brisk walking) and 2 or more days/ week of muscle strength activities  Avoid alcohol or tobacco use if applicable  +fhx of DM (mom)  Ordered:  CBC w/ Auto Diff, Routine collect, *Est. 09/27/18 3:00:00 CDT, Blood, Order for future visit, *Est. Stop date 09/27/18 3:00:00 CDT, Lab Collect, HTN - Hypertension  Anemia  Impaired glucose tolerance, 09/27/18 9:41:00 CDT  Clinic Follow up, *Est. 01/27/19 3:00:00 CST, Order for future visit, HTN - Hypertension  Anemia  Lumbago  Impaired glucose tolerance  Anxiety  Tobacco user  Obesity, [facility]  Comprehensive Metabolic Panel, Routine collect, *Est. 09/27/18 3:00:00 CDT, Blood, Order for future visit, *Est. Stop date 09/27/18 3:00:00 CDT, Lab Collect, HTN - Hypertension  Anemia  Impaired glucose tolerance, 09/27/18 9:41:00 CDT  Microalbum/Creatinine Ratio Urine (Microalb/Creat), Routine collect, Urine, Order for future visit, *Est. 09/27/18 3:00:00 CDT, *Est. Stop date 09/27/18 3:00:00 CDT, Nurse collect, HTN - Hypertension  Anemia  Impaired glucose tolerance  Office/Outpatient Visit Level 4 Established 10307 PC, HTN - Hypertension  Anemia  Lumbago  Impaired glucose tolerance  Anxiety  Tobacco user  Obesity  Uterine fibroid  Well adult exam, [facility], 09/27/18 9:48:00 CDT  Urinalysis with Microscopic if Indicated, Routine collect, Urine, Order for future visit, *Est. 09/27/18 3:00:00 CDT, *Est. Stop date 09/27/18 3:00:00 CDT, Nurse collect, HTN - Hypertension  Anemia  Impaired glucose tolerance, 09/27/18 9:41:00 CDT  ?  5.?Anxiety  denies any depression, SI/HI  rx improves anxiety (taking only once daily at bedtime)  rx refilled  discussed antidepressant medications though pt denies depression  Ordered:  hydrOXYzine, 25 mg = 1 tab(s), Oral, QID, PRN PRN as needed for anxiety, # 120 tab(s), 3 Refill(s), Pharmacy: [facility]/pharmacy #[id number]  Clinic  Follow up, *Est. 01/27/19 3:00:00 CST, Order for future visit, HTN - Hypertension  Anemia  Lumbago  Impaired glucose tolerance  Anxiety  Tobacco user  Obesity, [facility]  Office/Outpatient Visit Level 4 Established 23493 PC, HTN - Hypertension  Anemia  Lumbago  Impaired glucose tolerance  Anxiety  Tobacco user  Obesity  Uterine fibroid  Well adult exam, [facility], 09/27/18 9:48:00 CDT  ?  6.?Tobacco user  Educated on importance of smoking cessation and health benefits, including adverse effects to patients health and organs.  Encouraged patient to?quit or begin weaning.  Patient not ready to quit at this time.  Social support  Ordered:  Clinic Follow up, *Est. 01/27/19 3:00:00 CST, Order for future visit, HTN - Hypertension  Anemia  Lumbago  Impaired glucose tolerance  Anxiety  Tobacco user  Obesity, [facility]  Office/Outpatient Visit Level 4 Established 89354 PC, HTN - Hypertension  Anemia  Lumbago  Impaired glucose tolerance  Anxiety  Tobacco user  Obesity  Uterine fibroid  Well adult exam, [facility], 09/27/18 9:48:00 CDT  ?  7.?Obesity  wt loss of 5# 3 mo  Educated on health benefits of?at least 5 days/ week?of 30 minutes moderate intensity exercise (brisk walking) and 2 or more days/ week of muscle strength activities  Educated on low fat/carb diet  Ordered:  Clinic Follow up, *Est. 01/27/19 3:00:00 CST, Order for future visit, HTN - Hypertension  Anemia  Lumbago  Impaired glucose tolerance  Anxiety  Tobacco user  Obesity, [facility]  Office/Outpatient Visit Level 4 Established 66864 PC, HTN - Hypertension  Anemia  Lumbago  Impaired glucose tolerance  Anxiety  Tobacco user  Obesity  Uterine fibroid  Well adult exam, [facility], 09/27/18 9:48:00 CDT  ?  8.?Uterine fibroid  CT abd/ pelvis with finding of uterine fibroid  referral to GYN  US scheduled 10/8/18  Ordered:  Internal Referral to Gynecology, Uterine Fibroid, *Est. 10/27/18 3:00:00 CDT,  Future Visit?, Uterine fibroid  Well adult exam  Office/Outpatient Visit Level 4 Established 44314 PC, HTN - Hypertension  Anemia  Lumbago  Impaired glucose tolerance  Anxiety  Tobacco user  Obesity  Uterine fibroid  Well adult exam, [facility], 09/27/18 9:48:00 CDT  ?  Well adult exam  wellness labs UTD  referral to GYN (PAP ~ 4/2018 at [facility]- requested records)  [facility] UD 4/2018  Ordered:  Internal Referral to Gynecology, Uterine Fibroid, *Est. 10/27/18 3:00:00 CDT, Future Visit?, Uterine fibroid  Well adult exam  Office/Outpatient Visit Level 4 Established 24779 PC, HTN - Hypertension  Anemia  Lumbago  Impaired glucose tolerance  Anxiety  Tobacco user  Obesity  Uterine fibroid  Well adult exam, [facility], 09/27/18 9:48:00 CDT  Request Pap Smear Results, 09/27/18 9:45:00 CDT, Request PAP records from [facility] (~4/2018).  ?  RTC in 4 mo  labs today  referral: GYN  requested records as above for PAP  keep appointment for US pelvic 10/8/18  If at any time condition worsens or experience new symptoms/ concerns, please call clinic for sooner appointment or go to ED/UCC.   Problem List/Past Medical History  Ongoing  Anemia  Anxiety  HTN - Hypertension  Lumbago  Obesity  Tobacco user  Historical  No qualifying data  Procedure/Surgical History  C SEC X 4   Medications  amLODIPine 5 mg oral tablet, 5 mg= 1 tab(s), Oral, Daily, 3 refills  ferrous sulfate 325 mg (65 mg elemental iron) oral delayed release tablet, 325 mg= 1 tab(s), Oral, TID, 4 refills  hydrOXYzine hydrochloride 25 mg oral tablet, 25 mg= 1 tab(s), Oral, QID, PRN, 3 refills  ibuprofen 800 mg oral tablet, 800 mg= 1 tab(s), Oral, q8hr  Allergies  No Known Allergies  Social History  Alcohol - Denies Alcohol Use, 09/28/2014  Never, 09/15/2017  Employment/School  Unemployed, Highest education level: High school., 03/26/2018  Home/Environment  Lives with Spouse, grand baby. Living situation: Home/Independent.  Alcohol abuse in household: No. Substance abuse in household: No. Smoker in household: Yes. Feels unsafe at home: No. Safe place to go: Yes., 03/26/2018  Nutrition/Health  Regular, 03/26/2018  Substance Abuse - Denies Substance Abuse, 09/28/2014  Never, 09/15/2017  Tobacco - High Risk, 09/28/2014  Current every day smoker, No, 10 per day. Ready to change: No. Household tobacco concerns: No., 09/27/2018  Family History  Diabetes: Mother.  HEART DISEASES: Sister.  Hypertension.: Mother, Father and Sister.  Health Maintenance  Health Maintenance  ???Pending?(in the next year)  ??? ??OverDue  ??? ? ? ?Diabetes Screening due??and every?  ??? ??Due?  ??? ? ? ?ADL Screening due??09/27/18??and every 1??year(s)  ??? ? ? ?Cervical Cancer Screening due??09/27/18??and every?  ??? ? ? ?Influenza Vaccine due??09/27/18??and every?  ??? ??Refused?  ??? ? ? ?Tetanus Vaccine due??09/27/18??and every 10??year(s)  ??? ??Due In Future?  ??? ? ? ?Hypertension Management-Education not due until??03/26/19??and every 1??year(s)  ??? ? ? ?Alcohol Misuse Screening not due until??03/26/19??and every 1??year(s)  ??? ? ? ?Hypertension Management-BMP not due until??07/11/19??and every 1??year(s)  ??? ? ? ?Obesity Screening not due until??09/04/19??and every 1??year(s)  ??? ? ? ?Smoking Cessation not due until??09/04/19??and every 1??year(s)  ???Satisfied?(in the past 1 year)  ??? ??Satisfied?  ??? ? ? ?Alcohol Misuse Screening on??03/26/18.??Satisfied by Angelina Nagel  ??? ? ? ?Blood Pressure Screening on??09/27/18.??Satisfied by Wendi Cortes  ??? ? ? ?Body Mass Index Check on??09/04/18.??Satisfied by Nuno RODRIGUEZ, Judith Tomlin  ??? ? ? ?Breast Cancer Screening on??04/26/18.??Satisfied by Lesly Christopher  ??? ? ? ?Cervical Cancer Screening on??09/27/18.??Satisfied by Shira Edwards  ??? ? ? ?Depression Screening on??09/27/18.??Satisfied by Wendi Cortes  ??? ? ? ?Diabetes Screening  on??09/27/18.??Satisfied by Shira Edwards  ??? ? ? ?Hypertension Management-Blood Pressure on??09/27/18.??Satisfied by Wendi Cortes  ??? ? ? ?Hypertension Management-BMP on??07/11/18.??Satisfied by Danielle Henriquez  ??? ? ? ?Hypertension Management-Education on??03/26/18.??Satisfied by Angelina Nagel??Reason: Expectation Satisfied Elsewhere  ??? ? ? ?Influenza Vaccine on??09/27/18.??Satisfied by Wendi Cortes  ??? ? ? ?Lipid Screening on??04/12/18.??Satisfied by Prasanna Mcrae Jr.  ??? ? ? ?Obesity Screening on??09/04/18.??Satisfied by Judith Reina RN  ??? ? ? ?Smoking Cessation on??09/04/18.??Satisfied by Judith Reina RN  ??? ? ? ?Smoking Cessation (Coronary Artery Disease) on??09/04/18.??Satisfied by Judith Reina RN  ??? ??Refused?  ??? ? ? ?Influenza Vaccine on??03/26/18.??Recorded for Angelina Nagel??Reason: Patient Refuses  ??? ? ? ?Tetanus Vaccine on??06/27/18.??Recorded for Angelina Nagel??Reason: Patient Refuses  ?  ?  Lab Results  Test Name Test Result Date/Time Comments   BUN 10 mg/dL 07/11/2018 07:30 CDT    Creatinine 0.60 mg/dL 07/11/2018 07:30 CDT    BUN/Creat Ratio 17 07/11/2018 07:30 CDT    eGFR-AA >105 mL/min 07/11/2018 07:30 CDT    eGFR-AA >105 mL/min 04/12/2018 11:37 CDT    Bili Total 0.3 mg/dL 04/12/2018 11:37 CDT    Bili Direct 0.1 mg/dL 04/12/2018 11:37 CDT    Bili Indirect 0.2 mg/dL 04/12/2018 11:37 CDT    AST 16 unit/L 04/12/2018 11:37 CDT    ALT 13 unit/L 04/12/2018 11:37 CDT    Alk Phos 72 unit/L 04/12/2018 11:37 CDT    Total Protein 8.7 gm/dL (High) 04/12/2018 11:37 CDT    Albumin Lvl 3.9 gm/dL 04/12/2018 11:37 CDT    Globulin 4.80 gm/mL (High) 04/12/2018 11:37 CDT    Iron Lvl 28 mcg/dL (Low) 06/27/2018 08:30 CDT    Transferrin 259.0 mg/dL 06/27/2018 08:30 CDT    TIBC 325 mcg/dL 06/27/2018 08:30 CDT    Iron Sat 8.6 % (Low) 06/27/2018 08:30 CDT    Ferritin Lvl 24.1 ng/mL 06/27/2018 08:30  CDT    Hgb A1c 5.7 % 04/12/2018 11:37 CDT    Total Prot-LC 7.5 gm/dL 06/27/2018 08:30 CDT    Albumin-LC 3.8 gm/dL 06/27/2018 08:30 CDT    Alpha-1-Globulin-LC 0.2 gm/dL 06/27/2018 08:30 CDT    Alpha-2-Globulin-LC 0.8 gm/dL 06/27/2018 08:30 CDT    Beta Globulin-LC 1.1 gm/dL 06/27/2018 08:30 CDT    Gamma Globulin-LC 1.6 gm/dL 06/27/2018 08:30 CDT    M Missael -LC Not Observed 06/27/2018 08:30 CDT    Globulin Tot-LC 3.7 gm/dL 06/27/2018 08:30 CDT    A/G Ratio -LC 1.1 06/27/2018 08:30 CDT    Immunofix Res-LC Comment 06/27/2018 08:30 CDT An apparent normal immunofixation pattern.   CARL Note: -LC Comment 06/27/2018 08:30 CDT <br/>Protein electrophoresis scan will follow via computer,<br/>mail, or  delivery.<br/>Performed At: [facility]<br/>[address] [place], [place] [id number]<br/>Karen MARCELINO MD Ph:[phone]<br/>Performed At:  [facility]<br/>[address] [place], [place] [id number]<br/>Bonifacio LEBRON MD Ph:[phone]<br/>Performed At: [address] [facility]<br/>[address] [id number]<br/>Felisha Harvey MD Ph:[phone]   Chol 161 mg/dL 04/12/2018 11:37 CDT    HDL 52 mg/dL 04/12/2018 11:37 CDT    Trig 70 mg/dL 04/12/2018 11:37 CDT    LDL 95 mg/dL 04/12/2018 11:37 CDT    Chol/HDL 3.1 04/12/2018 11:37 CDT    VLDL 14 mg/dL 04/12/2018 11:37 CDT    TSH 1.580 mIU/L 04/12/2018 11:37 CDT    WBC 6.5 x10(3)/mcL 06/27/2018 08:30 CDT    WBC 6.8 x10(3)/mcL 04/12/2018 11:37 CDT    RBC 4.11 x10(6)/mcL 06/27/2018 08:30 CDT    RBC 4.22 x10(6)/mcL 04/12/2018 11:37 CDT    Hgb 10.8 gm/dL (Low) 06/27/2018 08:30 CDT    Hgb 10.0 gm/dL (Low) 04/12/2018 11:37 CDT    Hct 33.9 % (Low) 06/27/2018 08:30 CDT    Hct 31.9 % (Low) 04/12/2018 11:37 CDT    Platelet 396 x10(3)/mcL 06/27/2018 08:30 CDT    Platelet 458 x10(3)/mcL (High) 04/12/2018 11:37 CDT    MCV 82.5 fL 06/27/2018 08:30 CDT    MCV 75.6 fL (Low) 04/12/2018 11:37 CDT    MCH 26.3 pg 06/27/2018 08:30 CDT    MCH 23.7 pg (Low) 04/12/2018 11:37 CDT    MCHC  31.9 gm/dL 06/27/2018 08:30 CDT    MCHC 31.3 gm/dL 04/12/2018 11:37 CDT    RDW 17.7 % (High) 06/27/2018 08:30 CDT    RDW 19.9 % (High) 04/12/2018 11:37 CDT    Occult Blood, Fecal, IA LC Negative 04/19/2018 03:23 CDT Performed At:  [facility]<br/>[address] [place], [place] [id number]<br/>Bonifacio LEBRON MD Ph:[phone]   UA Appear Hazy 06/27/2018 08:30 CDT    UA Color YELLOW 06/27/2018 08:30 CDT    UA Spec Grav 1.023 06/27/2018 08:30 CDT    UA Bili Negative 06/27/2018 08:30 CDT    UA pH 5.5 06/27/2018 08:30 CDT    UA Urobilinogen 2 (Abnormal) 06/27/2018 08:30 CDT    UA Blood 0.2 mg/dL 06/27/2018 08:30 CDT    UA Glucose Normal 06/27/2018 08:30 CDT    UA Ketones Negative 06/27/2018 08:30 CDT    UA Protein 20 (Abnormal) 06/27/2018 08:30 CDT    UA Nitrite Negative 06/27/2018 08:30 CDT    UA Leuk Est Negative 06/27/2018 08:30 CDT    UA WBC Interp 3-5 (Abnormal) 06/27/2018 08:30 CDT    UA RBC Interp 3-5 (Abnormal) 06/27/2018 08:30 CDT    UA Bact Interp None Seen 06/27/2018 08:30 CDT    UA Squam Epi Interp >100 (Abnormal) 06/27/2018 08:30 CDT    UA Hyal Cast Interp 6-10 (Abnormal) 06/27/2018 08:30 CDT    HIV Nonreactive 04/12/2018 11:37 CDT    Hep A IgM Nonreactive 04/12/2018 11:37 CDT    Hep B Core IgM Nonreactive 04/12/2018 11:37 CDT    Hep Bs Ag Negative 04/12/2018 11:37 CDT    Hep C Ab Nonreactive 04/12/2018 11:37 CDT    Immunoglob A- mg/dL 06/27/2018 08:30 CDT    Immunoglob G-LC 1449 mg/dL 06/27/2018 08:30 CDT    Immunoglob M- mg/dL 06/27/2018 08:30 CDT    Diagnostic Results  * Final Report *  ?  - RBC and WBC morphology within normal limits. ?Tena Ryan MD.  ?  Result type:???????  Perif Smear Eval  Result date:???????  June 27, 2018 8:30 CDT  Result status:???????  Auth (Verified)  Performed by:???????  Pete Talamantes on July 12, 2018 10:55 CDT  Verified by:???????  Connor RESENDIZ, Tena TINOCO on July 12, 2018 14:34 CDT  Encounter info:???????  [id number], [facility], Clinic Visit,  6/27/2018 - 6/27/2018  ?  ?  ? [1]  ?  (07/11/2018 09:35 CDT CT Abdomen and Pelvis W Contrast)  * Final Report *  ?  Reason For Exam  Other (please specify)  ?  Radiology Report  ?  History.  Hematuria.  ?  Reference.  No prior CT.  ?  Technique.  Helical acquisition through the abdomen and pelvis with IV contrast.  Delayed images were obtained. Three plane reconstructions were  provided for review. DLP 1751 mGycm. Automatic exposure control,  adjustment of mA/kV or iterative reconstruction technique was used to  reduce radiation.  ?  Findings.  There is mild atelectasis at the lung bases. Heart appears mildly  enlarged.  ?  No significant abnormality of the liver, gallbladder, spleen, pancreas  or adrenals.  ?  There is no hydronephrosis. No urinary tract calculi are seen. Small  calculi could be obscured by contrast. There is no suspicious renal  lesion. Urinary bladder is not well distended.?  ?  There is no bowel obstruction. The appendix is normal. There are no  significant inflammatory changes of the bowel. No free air.  ?  There are heterogeneously enhancing uterine masses. There is a 4 cm  right adnexal cyst. There is no free fluid. The abdominal aorta is  normal in caliber. There are no enlarged retroperitoneal lymph nodes.?  ?  There are mild degenerative changes of the spine.  ?  Impression.  ?  1. No findings to explain hematuria.  2. Heterogeneously enhancing uterine masses possibly fibroids. Pelvic  ultrasound could further evaluate.  3. There is a 4 cm right adnexal cyst which could be within normal  limits in a pre or perimenopausal patient.  ?  ?  Signature Line  Electronically Signed By: Bryant RESENDIZ, Todd Elena  Date/Time Signed: 07/11/2018 10:13  ?  ?  This document has an image  ?  Result type:???????  CT Abdomen and Pelvis W Contrast  Result date:???????  July 11, 2018 9:35 CDT  Result status:???????  Auth (Verified)  Result title:???????  CT Abdomen and Pelvis W Contrast  Performed  by:???????  Todd Hurtado MD on July 11, 2018 10:04 CDT  Verified by:???????  Todd Hurtado MD on July 11, 2018 10:13 CDT  Encounter info:???????  [id number], [facility], Outpatient, 7/11/2018 - 7/11/2018  ? [2]  ?  (04/26/2018 12:42 CDT MG Screening)  ?  * Final Report *  ?  Reason For Exam  Z12.31, Breast CA Scr;Screening  ?  Radiology Report  SCREENING DIGITAL MAMMOGRAM WITH CAD: ?, 69606  ?  REASON FOR EXAMINATION: ?Screening  ?  PARENCHYMAL PATTERN: ?The breasts are heterogeneously dense which can  obscure a mass.?  ?  RISK FACTORS: No family history of breast cancer.  ?  Digital mammographic examination was performed followed by computer  aided analysis (CAD). There are no prior studies available for  comparison.?  ?  There are scattered parenchymal densities and scattered parenchymal  nodularity. There are no suspicious masses, calcifications, or  densities. ? ? ? ? ? ? ?  ?  IMPRESSION: ? No mammographic evidence for breast malignancy.  ?  UNLESS OTHERWISE CLINICALLY INDICATED ROUTINE SCREENING MAMMOGRAPHY IS  RECOMMENDED IN ONE YEAR. PATIENT INFORMATION HAS BEEN ENTERED INTO A  REMINDER SYSTEM WITH A TARGET DATE FOR THE NEXT MAMMOGRAM FOR WHICH  THE PATIENT WILL BE NOTIFIED.  ?  ?  ?  ?  ?  ?  ?  Signature Line  BI-RADS: 1-Negative  Recommendation: Normal interval follow-up  ?  Electronically Signed By: Ilene Vazquez MD  Date/Time Signed: 04/26/2018 15:18  ?  ?  This document has an image  ?  Result type:???????  MG Screening  Result date:???????  April 26, 2018 12:42 CDT  Result status:???????  Auth (Verified)  Result title:???????  MG Screening  Performed by:???????  Ilene Vazquez MD on April 26, 2018 15:17 CDT  Verified by:???????  Ilene Vazquez MD on April 26, 2018 15:18 CDT  Encounter info:???????  [id number], [facility], Outpatient, 4/26/2018 - 4/26/2018  ?  ?  ? [3]     [1]? ; Pete Talamantes 06/27/2018 08:30 CDT  [2]?CT Abdomen and Pelvis W  Contrast; Bryant RESENDIZ, Todd Elena 07/11/2018 09:35 CDT  [3]?MG Screening; Ilene Vazquez MD 04/26/2018 12:42 CDT

## 2022-05-02 NOTE — HISTORICAL [facility]
This is a historical note converted from [place]. Formatting and pictures may have been removed.  Please reference [employer] for original formatting and attached multimedia. Chief Complaint  follow up; medicine refill  History of Present Illness  Tova is a 47 yo AAF here for routine follow up.? She presents today with her  and grandson.? Pmhx includes HTN, IGT, anemia, lumbago, anxiety, and tobacco abuse. ?Today she is?c/o ongoing?low back pain. States it is always there. Describes as pain. Denies any aggravating or relieving factors. Denies any exercise or stretching. Denies any use of warm heat or cold compress because it does not help. ?Does have Rx ibuprofen 800 mg at home, however not taking. ?Did not complete x-ray lumbar spine as ordered at last visit. ?Labs completed this morning with worsening H/H; she reports she is only taking FS once daily. Needs to get more stool softeners, which does treat her constipation.? BP elevated, 145/85. ?Asymptomatic.??Admits to still having a lot going on at home?involving her son?who is in trouble with the law. ?Admits to having a positive attitude?though and?knows when she needs to?take a break. ?No other concerns.  ?   9/27/18: Tova is a 47 yo AAF presenting today for routine f/u. Former patient of SHAUNA Lara, last visit 6/27/18. Pmhx includes HTN, IGT, anemia, lumbago, anxiety, and tobacco abuse. Today she is c/o ongoing low back pain x 1 year. 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 Denies being seen after injury, reports?I wanted to keep my job and thought it would go?away.?10/10. Describes as sharp, shooting pain worse to L low back/ buttock.?Aggravating factors: standing and sitting for extended periods of time. Tx at home with Advil pm at bedtime without relief (but does help her sleep). Denies any stretching or?ice/ heat therapy. Asking for refill of anxiety medication.?States she has been emotional  d/t going through a lot right now...?Further states her  recently left her (last Saturday) unexpectedly and her son has been in [facility] for a serious charge. Medication helps?anxiety; tx?once daily. Does engage in Taoism?regularly.?Wt loss of 5# since last visit. Eating ~ 1 meal daily. Admits to decreased appetite s/t anxiety and stressors at home. BP at goal today. Compliant with medication as prescribed. Reviewed A1c 5.7% (+fhx: mom) and educated on ADA diet. Anemia tx with otc iron supplements. No other concerns.  Review of Systems  Constitutional: negative except as stated in HPI  Eye: negative except as stated in HPI  ENMT: negative except as stated in HPI  Respiratory: negative except as stated in HPI  Cardiovascular: negative except as stated in HPI  Gastrointestinal: negative except as stated in HPI  Genitourinary: negative except as stated in HPI  Hema/Lymph: negative except as stated in HPI  Endocrine: negative except as stated in HPI  Immunologic: negative except as stated in HPI  Musculoskeletal: negative except as stated in HPI  Integumentary: negative except as stated in HPI  Neurologic: negative except as stated in HPI  ?   All Other ROS_ ?negative except as stated in HPI  Physical Exam  Vitals & Measurements  T:?36.7? ?C (Oral)? HR:?94(Peripheral)? RR:?20? BP:?145/85?  HT:?157.48?cm? WT:?75.8?kg? BMI:?30.56?  General: Alert and oriented. Well dressed. No acute distress.  HENT: Normocephalic.  Neck: Supple.  Respiratory: Lungs are clear to auscultation, Respirations are non-labored, Breath sounds are equal, Symmetrical chest wall expansion.  Cardiovascular: Normal rate, Regular rhythm, No murmur.  Musculoskeletal: DROM with?minimal?tenderness to palpation of lumbar spinous process without muscle contractions. Moderate tenderness to L SI joint. SLR positive?bilaterally. ?Strength: RLE, LLE 5/5. Steady gait.  Integumentary: Warm, Dry, Intact.  Neurologic: No focal deficits.  Psychiatric: Calm.  ?Appropriate mood. Denies SI/HI.  Assessment/Plan  1.?Lumbago?M54.5  XR lumbar spine?reordered-patient did not complete as ordered last visit  Encouraged?daily stretching?and exercise  Handout provided with back exercises  Encouraged?ice/ heat therapy as tolerated for pain relief  Encourage use of?Rx Ibuprofen 800 mg every 8 hours as needed for pain relief; take with food and avoid other NSAIDS while taking  Return to clinic 3 months  Ordered:  Clinic Follow up, *Est. 07/25/19 9:00:00 CDT, Order for future visit, Anemia, [facility]  XR Spine Lumbar 2 or 3 Views, Routine, 04/24/19 10:38:00 CDT, Back Pain, None, Ambulatory, Rad Type, Lumbago, Not Scheduled, 04/24/19 10:38:00 CDT  ?  2.?HTN - Hypertension?I10  /85, asymptomatic  Continue with medications as prescribed and will continue to monitor as last blood pressure?was within normal limits  Ordered:  Clinic Follow up, *Est. 07/25/19 9:00:00 CDT, Order for future visit, Anemia, [facility]  ?  3.?Anxiety?F41.9  Controlled  Discussed conservative/ nonpharmacological interventions for?behavioral modification such as meditation,?exercise, and?realizing when?made?to take breaks for self  Continue with hydroxyzine as needed for anxiety  Ordered:  hydrOXYzine, 25 mg = 1 tab(s), Oral, QID, PRN PRN as needed for anxiety, # 120 tab(s), 1 Refill(s), Pharmacy: [facility]/pharmacy #[id number]  Clinic Follow up, *Est. 07/25/19 9:00:00 CDT, Order for future visit, Anemia, [facility]  ?  4.?Anemia?D64.9  H/H 10.7/33.9?(slight decrease)  Encourage use of?supplement 3 times daily?with stool softener  labs 3 mo for f/u  Ordered:  ferrous sulfate, 325 mg = 1 tab(s), Oral, TID, # 270 tab(s), 1 Refill(s), Pharmacy: [facility]/pharmacy #[id number]  Clinic Follow up, *Est. 07/25/19 9:00:00 CDT, Order for future visit, Anemia, [facility]  Ferritin, Routine collect, *Est. 07/24/19 3:00:00 CDT, Blood, Order for future visit, *Est. Stop date 07/24/19 3:00:00 CDT, Lab Collect, Anemia, 04/24/19 11:08:00  CDT  Hemoglobin and Hematocrit, Routine collect, *Est. 07/24/19 3:00:00 CDT, Blood, Order for future visit, *Est. Stop date 07/24/19 3:00:00 CDT, Lab Collect, Anemia, 04/24/19 11:08:00 CDT  ?  5.?Well adult exam?Z00.00  Health Maintenance:  MMG- 4/26/18? BIRADS 1; ordered  GYN- PAP 2/11/19 NIL, HPV 16, 18, 45 negative  DEXA-  CRC-  ?   Vaccines:  Influenza-  Pneumonia-  Tdap-  ?  Orders:  MG Screening, Routine, *Est. 04/24/19 3:00:00 CDT, Screening, None, Ambulatory, Rad Type, Order for future visit, Visit for screening mammogram, Schedule this test, [facility], Follow Breast Imaging Order Set Protocol, *Est. 04/24/19 3:00:00 CDT  RTC in?3 months with labs  Mammogram ordered  XR lumbar spine re-ordered to do today  If at any time condition worsens or experience new symptoms/ concerns, please call clinic for sooner appointment or go to ED/UCC.   Problem List/Past Medical History  Ongoing  Anemia  Anxiety  Depression  HTN - Hypertension  Lumbago  Obesity  Tobacco user  Historical  No qualifying data  Procedure/Surgical History  bilateral tubal ligation  C SEC X 4   Medications  amLODIPine 5 mg oral tablet, 5 mg= 1 tab(s), Oral, Daily, 3 refills  ferrous sulfate 325 mg (65 mg elemental iron) oral delayed release tablet, 325 mg= 1 tab(s), Oral, TID, 1 refills  hydrOXYzine hydrochloride 25 mg oral tablet, 25 mg= 1 tab(s), Oral, QID, PRN, 1 refills  Allergies  No Known Allergies  Social History  Alcohol - Denies Alcohol Use, 09/28/2014  Never, 09/15/2017  Employment/School  Unemployed, Highest education level: High school., 03/26/2018  Exercise  Exercise duration: 60. Exercise frequency: 3-4 times/week. Exercise type: Walking., 12/12/2018  Home/Environment  Lives with Spouse, grand baby. Living situation: Home/Independent. Alcohol abuse in household: No. Substance abuse in household: No. Smoker in household: Yes. Feels unsafe at home: No. Safe place to go: Yes., 03/26/2018  Nutrition/Health  Regular,  03/26/2018  Sexual  Sexually active: Yes. Sexually active at age 14 Years. Number of current partners 1. Number of lifetime partners 10. Sexual orientation: Heterosexual. Uses condoms: No. History of sexual abuse: No., 12/12/2018  Substance Abuse - Denies Substance Abuse, 09/28/2014  Never, 09/15/2017  Tobacco - High Risk, 09/28/2014  10 or more cigarettes (1/2 pack or more)/day in last 30 days, Cigarettes, No, 04/24/2019  Family History  Diabetes: Mother.  HEART DISEASES: Sister.  Hypertension.: Mother, Father and Sister.  Health Maintenance  Health Maintenance  ???Pending?(in the next year)  ??? ??OverDue  ??? ? ? ?Diabetes Screening due??and every?  ??? ? ? ?Hypertension Management-Education due??03/26/19??and every 1??year(s)  ??? ??Due?  ??? ? ? ?Alcohol Misuse Screening due??03/26/19??and every 1??year(s)  ??? ??Refused?  ??? ? ? ?Tetanus Vaccine due??04/24/19??and every 10??year(s)  ??? ??Due In Future?  ??? ? ? ?Smoking Cessation not due until??09/04/19??and every 1??year(s)  ??? ? ? ?Hypertension Management-BMP not due until??09/27/19??and every 1??year(s)  ??? ? ? ?ADL Screening not due until??02/11/20??and every 1??year(s)  ??? ? ? ?Blood Pressure Screening not due until??04/23/20??and every 1??year(s)  ??? ? ? ?Body Mass Index Check not due until??04/23/20??and every 1??year(s)  ??? ? ? ?Hypertension Management-Blood Pressure not due until??04/23/20??and every 1??year(s)  ???Satisfied?(in the past 1 year)  ??? ??Satisfied?  ??? ? ? ?ADL Screening on??02/11/19.??Satisfied by Stevenson Cordoba  ??? ? ? ?Blood Pressure Screening on??04/24/19.??Satisfied by Lydia Schmitt LPN  ??? ? ? ?Body Mass Index Check on??04/24/19.??Satisfied by Lydia Schmitt LPN  ??? ? ? ?Breast Cancer Screening on??04/26/18.??Satisfied by Lesly Christopher  ??? ? ? ?Cervical Cancer Screening on??02/11/19.??Satisfied by Jessica Hines  ??? ? ? ?Depression Screening on??04/24/19.??Satisfied by Lydia Schmitt LPN  ??? ? ?  ?Diabetes Screening on??09/27/18.??Satisfied by Clementina Albright, Prasanna Thacker  ??? ? ? ?Hypertension Management-Blood Pressure on??04/24/19.??Satisfied by Lydia Schmitt LPN  ??? ? ? ?Influenza Vaccine on??12/12/18.??Satisfied by Lore Mcintyre LPN  ??? ? ? ?Obesity Screening on??04/24/19.??Satisfied by Lydia Schmitt LPN  ??? ? ? ?Smoking Cessation on??09/04/18.??Satisfied by Nuno RODRIGUEZ, Judith Tomlin  ??? ??Refused?  ??? ? ? ?Tetanus Vaccine on??06/27/18.??Recorded by Jane PORTER, Angelina Mccormick??Reason: Patient Refuses  ?  ?  Lab Results  Test Name Test Result Date/Time   WBC 6.3 x10(3)/mcL 04/24/2019 08:55 CDT   WBC 7.6 x10(3)/mcL 12/12/2018 11:42 CST   RBC 4.27 x10(6)/mcL 04/24/2019 08:55 CDT   RBC 4.41 x10(6)/mcL 12/12/2018 11:42 CST   Hgb 10.7 gm/dL (Low) 04/24/2019 08:55 CDT   Hgb 11.3 gm/dL (Low) 12/12/2018 11:42 CST   Hct 33.9 % (Low) 04/24/2019 08:55 CDT   Hct 35.3 % 12/12/2018 11:42 CST   Platelet 472 x10(3)/mcL (High) 04/24/2019 08:55 CDT   Platelet 498 x10(3)/mcL (High) 12/12/2018 11:42 CST   MCV 79.4 fL (Low) 04/24/2019 08:55 CDT   MCH 25.1 pg (Low) 04/24/2019 08:55 CDT   MCHC 31.6 gm/dL 04/24/2019 08:55 CDT   RDW 16.8 % (High) 04/24/2019 08:55 CDT   MPV 8.7 fL 04/24/2019 08:55 CDT   Abs Neut 3.49 x10(3)/mcL 04/24/2019 08:55 CDT   Neutro Auto 55 x10(3)/[facility] 04/24/2019 08:55 CDT   Lymph Auto 31 % 04/24/2019 08:55 CDT   Mono Auto 9 % 04/24/2019 08:55 CDT   Eos Auto 3 04/24/2019 08:55 CDT   Abs Eos 0.20 04/24/2019 08:55 CDT   Basophil Auto 1 04/24/2019 08:55 CDT   Abs Neutro 3.49 x10(3)/[facility] 04/24/2019 08:55 CDT   Abs Lymph 1.96 x10(3)/[facility] 04/24/2019 08:55 CDT   Abs Mono 0.58 x10(3)/[facility] 04/24/2019 08:55 CDT   Abs Baso 0.05 x10(3)/[facility] 04/24/2019 08:55 CDT   IG% 1 % 04/24/2019 08:55 CDT   IG# 0.0600 04/24/2019 08:55 CDT

## 2022-05-02 NOTE — HISTORICAL [facility] CERNER
This is a historical note converted from [employer]. Formatting and pictures may have been removed.  Please reference [employer] for original formatting and attached multimedia. Chief Complaint  follow up; increasing left hip & knee pain  History of Present Illness  December 17, 2020: Tova is a pleasant?51 yo AAF due for routine follow up and lab results.?PMhx includes HTN, IGT, anemia, lumbago, anxiety, and tobacco abuse. c/o left wrist pain ongoing x few days. Describes as throbbing pain. Constant. Worse when holding objects. Tx with Tylenol with relief. Anxiety has improved. Less stress than before. She states she is no longer with her  since March 7, 2020. She still has stress associated with her son though and continues with hydroxyzine prn. No refill needed. /80. Wt gain 21# since January. Back pain stable but admits she was feeling better when she was exercising. Took 4 doses of vitamin?D replacement.?Having irregular menstrual cycles. Denies any hot flashes or other noticeable symptoms. Denies any fever, chills, HA, sore throat, sinus congestion, rhinorrhea, cough, SOB, CP, abdominal pain, n/v/d.  ?   August 10, 2021: Tova is a 52 yo AAF for follow up and lab results. Her son is present with her for phone visit.?PMhx includes HTN, IGT, anemia, lumbago, anxiety, and tobacco abuse. She is c/o worsening back pain with associated left leg weakness. She states she feels like it can give out on her at any time. She also mentions she has left ankle pain. She states when she massages her foot she can feel it in her back. Reviewed labs. A1c 6%, prediabetic. Positive family history. UA with bacteria and hematuria noted. Urine culture negative for growth. Denies any dysuria. Expresses concern regarding hematuria with history of. Chart review revealed patient completed work up in 2018 without findings to explain and referral to Urology was ordered. She denies seeing Urologist. She is a smoker. Vit D mildly  low. Taking 1000 IU once daily. Denies any other concerns.  ?   November 22, 2021:Patient for follow up. PMH includes HTN, IGT, anemia, Lumbago, anxiety, tobacco abuse. c/o anxiety. Has a lot going on at home and work. 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 She does not want the responsibility for GM. She is working from open to close six days a week. She states it is too much and still dealing with things for her son. Unable to take hydroxyzine during the day because it makes her sleepy and does not want anything to affect her. /84. Due for labs and not fasting today. She has never had colonoscopy. Quit smoking about a year and a half ago. Denies any other concerns/ complaints, fever, chills, cough, SOB, CP, abdominal pain, n/v/d, bloody stools or poor appetite.  ?  December 29, 2021: Tova is a 50 yo AAF for 1 mo follow up for anxiety. PMH includes HTN, IGT, anemia, lumbago, anxiety, tobacco abuse. She reports anxiety is better though she did quit her job after last visit. Still has a lot of personal things going on. Taking Buspirone once at bedtime. She is c/o left hip and left knee pain today. She reports pain 10/10 and decreases to 7/10 after indomethacin. She feels that pain is worsening. She does not feel any relief/ improvement in low back pain or left hip pain after engaging in PT. Painful to lay on left side.?If she sits too long, painful. If she stands too long, it is painful. She cannot walk too long without pain. She does endorse swelling to left knee at times. Has intermittent numbness to left leg at times. Denies any injuries/ falls/ trauma. Denies any other concerns, fever, chills, HA, sore throat, sinus congestion.  Review of Systems  Constitutional: negative except as stated in HPI  Eye: negative except as stated in HPI  ENMT: negative except as stated in HPI  Respiratory: negative except as stated in HPI  Cardiovascular: negative except as stated in  HPI  Gastrointestinal: negative except as stated in HPI  Genitourinary: negative except as stated in HPI  Hema/Lymph: negative except as stated in HPI  Endocrine: negative except as stated in HPI  Immunologic: negative except as stated in HPI  Musculoskeletal: negative except as stated in HPI  Integumentary: negative except as stated in HPI  Neurologic: negative except as stated in HPI  ?   All Other ROS_ ?negative except as stated in HPI  Physical Exam  Vitals & Measurements  T:?36.9? ?C (Oral)? HR:?79(Peripheral)? RR:?20? BP:?129/77?  HT:?161.00?cm? WT:?80.900?kg? BMI:?31.21?  General: Alert and oriented. Appropriately dressed.?No acute distress.  Eye: Pupils are equal, round and reactive to light, Extraocular movements are intact.  HENT: Normocephalic. Wearing facial mask.  Neck: Supple, Non-tender.?No lymphadenopathy. No thyromegaly, firmness or nodularity.  Respiratory: Lungs are clear to auscultation,?non diminished and without wheezes, Respirations are?non-labored, Breath sounds are?equal, Symmetrical chest wall expansion.  Cardiovascular: Normal rate,?Regular rhythm,?No murmur.  Musculoskeletal: EASTON.?Limped gait?without assistance. Lumbar spinous process without tenderness. Reproducible low back and left hip pain with adduction. SLR positive bilaterally with reproducible pain to left side. Negative palpable tenderness, swelling to left knee. Limited ROM left hip.  Integumentary: Warm, Dry, Intact.  Neurologic: No focal deficits, Cranial Nerves II-XII are grossly intact.  Psychiatric: Calm. Appropriate mood and affect. Judgment intact with clear thought processes. Denies SI/HI.  Assessment/Plan  1.?Anxiety?F41.9  c/o anxiety, uncontrolled with hydroxyzine (makes her sleepy and only able to take at night)  see hpi- increased stress/ work demands/ personal stress involving her son  begin buspirone 7.5mg BID  any worsening anxiety, SI/HI, notify provider for sooner appt; RTC 1 mo  ?   December 29, 2021  much  improved, quit her job but still has personal stress ongoing  may continue with Buspirone 7.5 mg BID  Ordered:  busPIRone, 7.5 mg = 1 tab(s), Oral, BID, # 60 tab(s), 1 Refill(s), Pharmacy: [id number], 161, cm, Height/Length Dosing, 12/29/21 10:36:00 CST, 80.9, kg, Weight Dosing, 12/29/21 10:36:00 CST  Clinic Follow up, *Est. 02/10/22 7:40:00 CST, Order for future visit, Left knee pain, [facility]  Comprehensive Metabolic Panel, Routine collect, 12/29/21 11:13:00 CST, Blood, Stop date 12/29/21 11:13:00 CST, Lab Collect, HTN - Hypertension  Hematuria  Anxiety  Well adult exam, 12/29/21 11:13:00 CST  Hemoglobin A1C [facility], Routine collect, 12/29/21 11:13:00 CST, Blood, Stop date 12/29/21 11:13:00 CST, Lab Collect, HTN - Hypertension  Hematuria  Anxiety  Well adult exam, 12/29/21 11:13:00 CST  Hepatitis Panel-, Routine collect, 12/29/21 11:13:00 CST, Blood, Stop date 12/29/21 11:13:00 CST, Lab Collect, HTN - Hypertension  Hematuria  Anxiety  Well adult exam, 12/29/21 11:13:00 CST  HIV 1 and 2, Routine collect, 12/29/21 11:13:00 CST, Blood, Stop date 12/29/21 11:13:00 CST, Lab Collect, HTN - Hypertension  Hematuria  Anxiety  Well adult exam, 12/29/21 11:13:00 CST  Lipid Panel, Routine collect, 12/29/21 11:13:00 CST, Blood, Stop date 12/29/21 11:13:00 CST, Lab Collect, HTN - Hypertension  Hematuria  Anxiety  Well adult exam, 12/29/21 11:13:00 CST  Microalbum/Creatinine Ratio Urine (Microalb/Creat), Routine collect, Urine, 12/29/21 11:13:00 CST, Stop date 12/29/21 11:13:00 CST, Nurse collect, HTN - Hypertension  Hematuria  Anxiety  Well adult exam  Syphilis Antibody (with Reflex RPR), Routine collect, 12/29/21 11:13:00 CST, Blood, Stop date 12/29/21 11:13:00 CST, Lab Collect, HTN - Hypertension  Hematuria  Anxiety  Well adult exam, 12/29/21 11:13:00 CST  Thyroid Stimulating Hormone, Routine collect, 12/29/21 11:13:00 CST, Blood, Stop date 12/29/21 11:13:00 CST, Lab Collect, HTN -  Hypertension  Hematuria  Anxiety  Well adult exam, 12/29/21 11:13:00 CST  ?  2.?Lumbago?M54.5  chronic low back pain that she describes is worsening, worse pain and associated left leg weakness  XR lumbar spine 10/27/2020?with mild degenerative changes noted  previously referred for PT but did not complete  encouraged daily stretching, hand out provided with specific?back?exercises  encouraged warm heat and alternate with ice x 20 minutes 3-4xd  avoid heavy lifting  monitor for worsening symptoms such as increased pain, numbness/tingling, weakness, loss of bowel/bladder function  repeat XR s/t new symptoms with left leg weakness and will need referral for PT  ?   November 22, 2021  XR lumbar spine with degenerative changes  Referred to [facility]; notes reviewed with improved symptoms and progress and patient would like to continue with PT though she has been unable to continue going regularly recently d/t work demands but will try to continue twice weekly  increase indomethacin to 50 mg as 25 mg ineffective for patient, take with food, avoid other NSAIDS  ?   December 29, 2021  She reports she has been engaging in PT without improvement in symptoms  Endorses left leg numbness?intermittently  Rates pain 10 out of 10  Does not engage in exercises at home  Mild relief with indomethacin,?stop indomethacin in May begin meloxicam 15 mg once daily as needed pain?to take with food and avoid other NSAIDs?as well as methocarbamol?500 mg?2 tabs 4 times daily as needed muscle pain?(may cause drowsiness, do not?drive or drink alcohol)  MRI lumbar spine ordered?for further eval  Ordered:  Clinic Follow up, *Est. 02/10/22 7:40:00 CST, Order for future visit, Left knee pain, [facility]  XR Hip Left 2 Views w/AP Pelvis, Routine, 12/29/21 11:25:00 CST, left hip pain, limited ROM, None, Ambulatory, Rad Type, Left hip pain  Lumbago, Not Scheduled, 12/29/21 11:25:00 CST  ?  3.?Left hip pain?M25.552  She is complaining  of left hip pain with reproducible tenderness.? See low back pain as above.? X-ray left hip?to rule out other etiologies, will notify results  Ordered:  Clinic Follow up, *Est. 02/10/22 7:40:00 CST, Order for future visit, Left knee pain, [facility] Internal [facility]  XR Hip Left 2 Views w/AP Pelvis, Routine, 12/29/21 11:25:00 CST, left hip pain, limited ROM, None, Ambulatory, Rad Type, Left hip pain  Lumbago, Not Scheduled, 12/29/21 11:25:00 CST  ?  4.?Left knee pain?M25.562  Complaining of left knee pain and swelling intermittently  X-ray left knee today, will call with results  Ordered:  Clinic Follow up, *Est. 02/10/22 7:40:00 CST, Order for future visit, Left knee pain, [facility]  XR Knee Left 3 Views, Routine, 12/29/21 11:25:00 CST, left knee pain and swelling, None, Ambulatory, Rad Type, Left knee pain, Not Scheduled, 12/29/21 11:25:00 CST  ?  5.?HTN - Hypertension?I10  /77  Follow?low sodium diet, < 2 gm/day (avoid high salty foods such as processed meats/ sausage/hsieh/ sandwich meat, chips, pickles, cheese, crackers and soft drinks/ electrolyte replacement drinks).  Avoid tobacco/ alcohol use  Educated on health benefits of?at least 5 days/ week?of 30 minutes moderate intensity exercise (brisk walking) and 2 or more days/ week of muscle strength activities  Daily ASA 81 mg for CV prevention  Continue current medication regimen  Ordered:  Comprehensive Metabolic Panel, Routine collect, 12/29/21 11:13:00 CST, Blood, Stop date 12/29/21 11:13:00 CST, Lab Collect, HTN - Hypertension  Hematuria  Anxiety  Well adult exam, 12/29/21 11:13:00 CST  Hemoglobin A1C [facility], Routine collect, 12/29/21 11:13:00 CST, Blood, Stop date 12/29/21 11:13:00 CST, Lab Collect, HTN - Hypertension  Hematuria  Anxiety  Well adult exam, 12/29/21 11:13:00 CST  Hepatitis Panel-, Routine collect, 12/29/21 11:13:00 CST, Blood, Stop date 12/29/21 11:13:00 CST, Lab Collect, HTN - Hypertension  Hematuria  Anxiety   Well adult exam, 12/29/21 11:13:00 CST  HIV 1 and 2, Routine collect, 12/29/21 11:13:00 CST, Blood, Stop date 12/29/21 11:13:00 CST, Lab Collect, HTN - Hypertension  Hematuria  Anxiety  Well adult exam, 12/29/21 11:13:00 CST  Lipid Panel, Routine collect, 12/29/21 11:13:00 CST, Blood, Stop date 12/29/21 11:13:00 CST, Lab Collect, HTN - Hypertension  Hematuria  Anxiety  Well adult exam, 12/29/21 11:13:00 CST  Microalbum/Creatinine Ratio Urine (Microalb/Creat), Routine collect, Urine, 12/29/21 11:13:00 CST, Stop date 12/29/21 11:13:00 CST, Nurse collect, HTN - Hypertension  Hematuria  Anxiety  Well adult exam  Syphilis Antibody (with Reflex RPR), Routine collect, 12/29/21 11:13:00 CST, Blood, Stop date 12/29/21 11:13:00 CST, Lab Collect, HTN - Hypertension  Hematuria  Anxiety  Well adult exam, 12/29/21 11:13:00 CST  Thyroid Stimulating Hormone, Routine collect, 12/29/21 11:13:00 CST, Blood, Stop date 12/29/21 11:13:00 CST, Lab Collect, HTN - Hypertension  Hematuria  Anxiety  Well adult exam, 12/29/21 11:13:00 CST  ?  6.?Anemia?D64.9  Due for labs  FIT Negative 12/21/2020  H/o menorrhagia (currently menstruating now and LMP 7 months ago)  Referral for colonoscopy- reports was contacted by Dr. Fischer office  continue FS  Ordered:  Ferritin, Routine collect, 12/29/21 11:13:00 CST, Blood, Stop date 12/29/21 11:13:00 CST, Lab Collect, Anemia, 12/29/21 11:13:00 CST  Iron Binding Capacity Total - Iron Profile, Routine collect, 12/29/21 11:13:00 CST, Blood, Stop date 12/29/21 11:13:00 CST, Lab Collect, Anemia, 12/29/21 11:13:00 CST  ?  Orders:  meloxicam, 15 mg = 1 tab(s), Oral, Daily, PRN PRN pain, severe, take with food, avoid other NSAIDs, # 30 tab(s), 1 Refill(s), Pharmacy: [facility]/pharmacy #[id number], 161, cm, Height/Length Dosing, 12/29/21 10:36:00 CST, 80.9, kg, Weight Dosing, 12/29/21 10:36:00 CST  methocarbamol, 1,000 mg = 2 tab(s), Oral, QID, PRN PRN as needed for pain, may cause drowsiness, do not drive or  drink alcohol, X 7 day(s), # 56 tab(s), 0 Refill(s), Pharmacy: [id number], 161, cm, Height/Length Dosing, 12/29/21 10:36:00 CST, 80.9, kg, Weight...  Vitamin D, 25-Hydroxy Level, Routine collect, 12/29/21 11:13:00 CST, Blood, Stop date 12/29/21 11:13:00 CST, Lab Collect, Vitamin D deficiency, 12/29/21 11:13:00 CST  Well Adult  Health Maintenance:  MMG-?1/13/2021 BIRADS 1. Ordered.  GYN- PAP 2/11/19 NIL, HPV 16, 18, 45 negative  DEXA-  CRC- FIT negative 12/21/2020. Referred for colonoscopy  ?   Vaccines:  Influenza- Refuses  Pneumonia-  Tdap- Refuses  COVID19 June 1, 2021  ?   RTC in 6 weeks  Keep all outpatient testing appointments as well as other providers/ clinics as scheduled.  If at any time condition worsens or experience new symptoms/ concerns, please call clinic for sooner appointment or go to ED/UCC.?  Referrals  Clinic Follow up, *Est. 02/10/22 7:40:00 CST, Order for future visit, Left knee pain, [facility]   Problem List/Past Medical History  Ongoing  Anemia  Anxiety  Hematuria  HTN - Hypertension  Lumbago  Obesity  Prediabetes  Vitamin D deficiency  Historical  Pregnant  Pregnant  Pregnant  Pregnant  Tobacco user  Procedure/Surgical History  bilateral tubal ligation  C SEC X 4   Medications  amlodipine 5 mg oral tablet, 5 mg= 1 tab(s), Oral, Daily, 2 refills  Blood pressure machine, See Instructions  busPIRone 7.5 mg oral tablet, 7.5 mg= 1 tab(s), Oral, BID, 1 refills  cholecalciferol 2000 intl units (50 mcg) oral capsule, 50 mcg= 1 cap(s), Oral, Daily, 2 refills  ferrous sulfate 325 mg (65 mg elemental iron) oral delayed release tablet, 325 mg= 1 tab(s), Oral, M,W,F, 2 refills  fluticasone 50 mcg/inh nasal spray, 1 spray(s), Nasal, Daily, 1 refills,? ?Not taking: as needed Last Dose Date/Time Unknown  Lidocaine Viscous 2% mucous membrane solution, 0.1 gm= 5 mL, TOP, QIDACHS, PRN,? ?Not taking: Last Dose Date/Time Unknown  meloxicam 15 mg oral tablet, 15 mg= 1 tab(s), Oral, Daily,  PRN, 1 refills  methocarbamol 500 mg oral tablet, 1000 mg= 2 tab(s), Oral, QID, PRN  Allergies  No Known Allergies  Social History  Abuse/Neglect  No, 12/29/2021  Alcohol - Denies Alcohol Use, 09/28/2014  Never, 09/15/2017  Employment/School  Employed, Highest education level: High school., 12/17/2020  Exercise  Home/Environment  Lives with Spouse, grand baby. Living situation: Home/Independent. Alcohol abuse in household: No. Substance abuse in household: No. Smoker in household: Yes. Feels unsafe at home: No. Safe place to go: Yes., 03/26/2018  Nutrition/Health  Regular, Good, 10/02/2019  Regular, 03/26/2018  Sexual  Sexually active: Yes. Sexually active at age 14 Years. Number of current partners 1. Number of lifetime partners 10. Sexual orientation: Heterosexual. Uses condoms: No. History of sexual abuse: No., 12/12/2018  Spiritual/Cultural  Congregation, 10/02/2019  Substance Use - Denies Substance Abuse, 09/28/2014  Never, 10/13/2020  Tobacco - High Risk, 09/28/2014  Family History  Diabetes: Mother.  HEART DISEASES: Sister.  Hypertension.: Mother, Father and Sister.  Immunizations  Vaccine Date Status   COVID-19 mRNA, LNP-S, PF - Moderna 06/01/2021 Recorded   COVID-19 mRNA, LNP-S, PF - Moderna 05/04/2021 Recorded   Health Maintenance  Health Maintenance  ???Pending?(in the next year)  ??? ??Due?  ??? ? ? ?Colorectal Screening due??12/21/21??and every 1??year(s)  ??? ? ? ?Zoster Vaccine due??12/29/21??Unknown Frequency  ??? ??Refused?  ??? ? ? ?Tetanus Vaccine due??12/29/21??and every 10??year(s)  ??? ??Due In Future?  ??? ? ? ?Obesity Screening not due until??01/01/22??and every 1??year(s)  ??? ? ? ?Alcohol Misuse Screening not due until??01/02/22??and every 1??year(s)  ??? ? ? ?Cervical Cancer Screening not due until??02/10/22??and every 3??year(s)  ??? ? ? ?Diabetes Screening not due until??08/09/22??and every 1??year(s)  ??? ? ? ?Hypertension Management-Education not due until??08/10/22??and every  1??year(s)  ??? ? ? ?Hypertension Management-BMP not due until??08/27/22??and every 1??year(s)  ???Satisfied?(in the past 1 year)  ??? ??Satisfied?  ??? ? ? ?ADL Screening on??12/29/21.??Satisfied by Lydia Schmitt LPN F  ??? ? ? ?Alcohol Misuse Screening on??06/14/21.??Satisfied by Sierra Reina LPN.  ??? ? ? ?Blood Pressure Screening on??12/29/21.??Satisfied by Lydia Schmitt LPN F  ??? ? ? ?Body Mass Index Check on??12/29/21.??Satisfied by Lydia Schmitt LPN F  ??? ? ? ?Breast Cancer Screening on??01/13/21.??Satisfied by Rosita Franco.  ??? ? ? ?Depression Screening on??12/29/21.??Satisfied by yLdia Schmitt LPN F  ??? ? ? ?Diabetes Screening on??08/09/21.??Satisfied by You Gomes.  ??? ? ? ?Hypertension Management-Blood Pressure on??12/29/21.??Satisfied by Lydia Schmitt LPN F  ??? ? ? ?Influenza Vaccine on??12/29/21.??Satisfied by Keshia KENT Lydia F  ??? ? ? ?Obesity Screening on??12/29/21.??Satisfied by Keshia KENT Lydia F  ?  Diagnostic Results  (08/11/2021 08:33 CDT XR Spine Lumbar 2 or 3 Views)  * Final Report *  ?  Reason For Exam  Worsening LBP with left sided leg weakness  ?  Radiology Report  XR Spine Lumbar 2 or 3 Views  ?  HISTORY: Worsening LBP with left sided leg weakness  ?  COMPARISON:?  Lumbar spine radiograph 10/27/2020  ?  FINDINGS:  There are the usual 5 nonrib bearing lumbar type vertebral bodies.  ?  No acute displaced fractures, compression deformities or subluxations.  Disc spaces maintained.  Multilevel facet hypertrophy.  No blastic or lytic lesions.  Soft tissues within normal limits.  ?  The sacrum is partially obscured by overlying stool and bowel gas.  ?  IMPRESSION:  ?  No acute osseous abnormality.  No significant interval change.  ?  Signature Line  Electronically Signed By: Sukhjinder RESENDIZ, Jose R Campos  Date/Time Signed: 08/11/2021 10:58  ?  ?  This document has an image  ?  Result type:???????XR Spine Lumbar 2 or 3 Views  Result date:???????August 11, 2021 8:33 CDT  Result  status:???????Auth (Verified)  Result title:???????XR Spine Lumbar 2 or 3 Views  Performed by:???????Jose R Slaughter MD on August 11, 2021 10:57 CDT  Verified by:???????Jose R Slaughter MD on August 11, 2021 10:58 CDT  Encounter info:???????[id number], [facility], Outpatient, 8/11/2021 - 8/11/2021  ?   ? [1]     [1]?XR Spine Lumbar 2 or 3 Views; Jose R Slaughter MD 08/11/2021 08:33 CDT

## 2022-05-10 ENCOUNTER — OFFICE VISIT (OUTPATIENT)
Dept: INTERNAL MEDICINE | Facility: CLINIC | Age: 52
End: 2022-05-10
Payer: MEDICAID

## 2022-05-10 VITALS
SYSTOLIC BLOOD PRESSURE: 122 MMHG | HEIGHT: 62 IN | TEMPERATURE: 98 F | HEART RATE: 76 BPM | RESPIRATION RATE: 16 BRPM | DIASTOLIC BLOOD PRESSURE: 84 MMHG | BODY MASS INDEX: 33.13 KG/M2 | WEIGHT: 180 LBS

## 2022-05-10 DIAGNOSIS — M47.816 LUMBAR SPONDYLOSIS: ICD-10-CM

## 2022-05-10 DIAGNOSIS — M25.561 RIGHT KNEE PAIN, UNSPECIFIED CHRONICITY: ICD-10-CM

## 2022-05-10 DIAGNOSIS — M16.0 OSTEOARTHRITIS OF BOTH HIPS, UNSPECIFIED OSTEOARTHRITIS TYPE: ICD-10-CM

## 2022-05-10 DIAGNOSIS — M25.571 RIGHT ANKLE PAIN, UNSPECIFIED CHRONICITY: ICD-10-CM

## 2022-05-10 DIAGNOSIS — M51.36 LUMBAR DEGENERATIVE DISC DISEASE: ICD-10-CM

## 2022-05-10 PROBLEM — R31.9 HEMATURIA: Status: ACTIVE | Noted: 2022-05-10

## 2022-05-10 PROBLEM — M54.50 LOW BACK PAIN: Status: ACTIVE | Noted: 2022-05-10

## 2022-05-10 PROBLEM — I10 HYPERTENSION: Status: ACTIVE | Noted: 2022-05-10

## 2022-05-10 PROBLEM — D64.9 ANEMIA: Status: ACTIVE | Noted: 2022-05-10

## 2022-05-10 PROBLEM — F41.1 GENERALIZED ANXIETY DISORDER: Status: ACTIVE | Noted: 2022-05-10

## 2022-05-10 PROBLEM — E66.9 OBESITY: Status: ACTIVE | Noted: 2022-05-10

## 2022-05-10 PROBLEM — R73.03 PREDIABETES: Status: ACTIVE | Noted: 2022-05-10

## 2022-05-10 PROBLEM — E55.9 VITAMIN D DEFICIENCY: Status: ACTIVE | Noted: 2022-05-10

## 2022-05-10 PROBLEM — M51.369 LUMBAR DEGENERATIVE DISC DISEASE: Status: ACTIVE | Noted: 2022-05-10

## 2022-05-10 PROCEDURE — 3079F PR MOST RECENT DIASTOLIC BLOOD PRESSURE 80-89 MM HG: ICD-10-PCS | Mod: CPTII,,, | Performed by: NURSE PRACTITIONER

## 2022-05-10 PROCEDURE — 1159F MED LIST DOCD IN RCRD: CPT | Mod: CPTII,,, | Performed by: NURSE PRACTITIONER

## 2022-05-10 PROCEDURE — 3079F DIAST BP 80-89 MM HG: CPT | Mod: CPTII,,, | Performed by: NURSE PRACTITIONER

## 2022-05-10 PROCEDURE — 3074F PR MOST RECENT SYSTOLIC BLOOD PRESSURE < 130 MM HG: ICD-10-PCS | Mod: CPTII,,, | Performed by: NURSE PRACTITIONER

## 2022-05-10 PROCEDURE — 99214 OFFICE O/P EST MOD 30 MIN: CPT | Mod: S$PBB,,, | Performed by: NURSE PRACTITIONER

## 2022-05-10 PROCEDURE — 1160F RVW MEDS BY RX/DR IN RCRD: CPT | Mod: CPTII,,, | Performed by: NURSE PRACTITIONER

## 2022-05-10 PROCEDURE — 99214 OFFICE O/P EST MOD 30 MIN: CPT | Mod: PBBFAC | Performed by: NURSE PRACTITIONER

## 2022-05-10 PROCEDURE — 99214 PR OFFICE/OUTPT VISIT, EST, LEVL IV, 30-39 MIN: ICD-10-PCS | Mod: S$PBB,,, | Performed by: NURSE PRACTITIONER

## 2022-05-10 PROCEDURE — 3008F BODY MASS INDEX DOCD: CPT | Mod: CPTII,,, | Performed by: NURSE PRACTITIONER

## 2022-05-10 PROCEDURE — 3008F PR BODY MASS INDEX (BMI) DOCUMENTED: ICD-10-PCS | Mod: CPTII,,, | Performed by: NURSE PRACTITIONER

## 2022-05-10 PROCEDURE — 1160F PR REVIEW ALL MEDS BY PRESCRIBER/CLIN PHARMACIST DOCUMENTED: ICD-10-PCS | Mod: CPTII,,, | Performed by: NURSE PRACTITIONER

## 2022-05-10 PROCEDURE — 1159F PR MEDICATION LIST DOCUMENTED IN MEDICAL RECORD: ICD-10-PCS | Mod: CPTII,,, | Performed by: NURSE PRACTITIONER

## 2022-05-10 PROCEDURE — 3074F SYST BP LT 130 MM HG: CPT | Mod: CPTII,,, | Performed by: NURSE PRACTITIONER

## 2022-05-10 RX ORDER — MELOXICAM 15 MG/1
15 TABLET ORAL DAILY PRN
Qty: 30 TABLET | Refills: 3 | Status: SHIPPED | OUTPATIENT
Start: 2022-05-10 | End: 2022-08-10 | Stop reason: SDUPTHER

## 2022-05-10 RX ORDER — METHOCARBAMOL 500 MG/1
1000 TABLET, FILM COATED ORAL 3 TIMES DAILY PRN
Qty: 84 TABLET | Refills: 3 | Status: SHIPPED | OUTPATIENT
Start: 2022-05-10 | End: 2022-05-30

## 2022-05-10 RX ORDER — HYDROXYZINE PAMOATE 25 MG/1
25 CAPSULE ORAL 3 TIMES DAILY PRN
Qty: 90 CAPSULE | Refills: 3 | Status: SHIPPED | OUTPATIENT
Start: 2022-05-10 | End: 2022-08-10 | Stop reason: SDUPTHER

## 2022-05-10 RX ORDER — FERROUS SULFATE 325(65) MG
325 TABLET, DELAYED RELEASE (ENTERIC COATED) ORAL
Qty: 39 TABLET | Refills: 3 | Status: SHIPPED | OUTPATIENT
Start: 2022-05-11 | End: 2022-08-10 | Stop reason: SDUPTHER

## 2022-05-10 RX ORDER — IBUPROFEN 800 MG/1
800 TABLET ORAL 3 TIMES DAILY
COMMUNITY
End: 2022-05-10

## 2022-05-10 NOTE — ASSESSMENT & PLAN NOTE
She thinks she has upcoming appt with Orthopedist end of this month- pt will check paperwork at home and let provider know

## 2022-05-10 NOTE — ASSESSMENT & PLAN NOTE
Continue HEP. Rx refill for Meloxicam and Methocarbamol to be used PRN. Referral to pain mgmt provider Dr. Barton in [place]   No

## 2022-05-11 ENCOUNTER — PATIENT MESSAGE (OUTPATIENT)
Dept: ADMINISTRATIVE | Facility: HOSPITAL | Age: 52
End: 2022-05-11
Payer: MEDICAID

## 2022-05-20 ENCOUNTER — HOSPITAL ENCOUNTER (EMERGENCY)
Facility: HOSPITAL | Age: 52
Discharge: HOME | End: 2022-05-20
Attending: FAMILY MEDICINE
Payer: MEDICAID

## 2022-05-20 VITALS
DIASTOLIC BLOOD PRESSURE: 76 MMHG | BODY MASS INDEX: 33.18 KG/M2 | HEART RATE: 76 BPM | TEMPERATURE: 98 F | HEIGHT: 62 IN | SYSTOLIC BLOOD PRESSURE: 112 MMHG | RESPIRATION RATE: 16 BRPM | OXYGEN SATURATION: 100 % | WEIGHT: 180.31 LBS

## 2022-05-20 DIAGNOSIS — R23.8 SCALP IRRITATION: Primary | ICD-10-CM

## 2022-05-20 PROCEDURE — 99282 EMERGENCY DEPT VISIT SF MDM: CPT

## 2022-05-20 RX ORDER — FERROUS SULFATE 325(65) MG
TABLET ORAL
COMMUNITY
Start: 2022-05-10 | End: 2022-08-10 | Stop reason: SDUPTHER

## 2022-05-20 RX ORDER — SODIUM, POTASSIUM,MAG SULFATES 17.5-3.13G
SOLUTION, RECONSTITUTED, ORAL ORAL
COMMUNITY
Start: 2022-05-04 | End: 2022-08-10

## 2022-05-20 NOTE — ED PROVIDER NOTES
Name: Tova Stack   Age: 51 y.o.  Sex: female    Chief complaint:   Chief Complaint   Patient presents with    Rash     C/o scalp irritation x 2 weeks.       Patient arrived with: Private  History obtained from: Patient    Subjective:   Patient with pmhx of HTN presents today c/o focal area of scalp irritation for 2 weeks. She says the area is itchy and dry. Denies use of new products or dyes, diffuse scalp pain/irritation, fever, chills.     Past Medical History:   Diagnosis Date    Hypertension      Past Surgical History:   Procedure Laterality Date     SECTION      4 total    TUBAL LIGATION       Social History     Socioeconomic History    Marital status: Single   Tobacco Use    Smoking status: Former Smoker     Types: Cigarettes    Smokeless tobacco: Former User     Quit date: 2019    Tobacco comment: quit 3 yrs ago   Substance and Sexual Activity    Alcohol use: Never    Drug use: Not Currently     Comment:     Sexual activity: Yes     Partners: Male     Social Determinants of Health     Physical Activity: Inactive    Days of Exercise per Week: 0 days    Minutes of Exercise per Session: 0 min     Review of patient's allergies indicates:  No Known Allergies     Review of Systems   Constitutional: Negative for chills and fever.   HENT: Negative for sore throat.    Respiratory: Negative for shortness of breath.    Cardiovascular: Negative for chest pain.   Gastrointestinal: Negative for abdominal pain, nausea and vomiting.   Musculoskeletal: Negative for neck pain.   Skin: Positive for itching. Negative for rash.   Neurological: Negative for headaches.   Endo/Heme/Allergies: Negative for polydipsia.          Objective:     Initial Vitals [22 0931]   BP Pulse Resp Temp SpO2   113/61 77 20 97.5 °F (36.4 °C) 99 %      MAP       --            Physical Exam  Vitals reviewed.   Constitutional:       General: She is not in acute distress.     Appearance: Normal appearance. She is not  toxic-appearing.   HENT:      Head: Normocephalic and atraumatic.      Mouth/Throat:      Mouth: Mucous membranes are moist.   Eyes:      Conjunctiva/sclera: Conjunctivae normal.   Cardiovascular:      Rate and Rhythm: Normal rate and regular rhythm.   Pulmonary:      Effort: Pulmonary effort is normal. No respiratory distress.   Abdominal:      Palpations: Abdomen is soft.   Musculoskeletal:         General: No deformity.      Cervical back: Neck supple.   Skin:     General: Skin is warm.      Capillary Refill: Capillary refill takes less than 2 seconds.      Findings: No bruising, erythema, lesion or rash.      Comments: Patient with hair in sectioned julissa. There are focal areas of dry scalp between the julissa. No erythema or swelling. No rash. No concerning appearing skin lesions. Non-ttp.   Neurological:      General: No focal deficit present.      Mental Status: She is alert and oriented to person, place, and time. Mental status is at baseline.   Psychiatric:         Mood and Affect: Mood normal.          Records:  Nursing records and triage records reviewed  Prior records reviewed    Labs:  No results found for this or any previous visit (from the past 24 hour(s)).     Images:  No results found.   Imaging Results    None            Medications:  Medications - No data to display         Medical decision making:                   Procedures       Diagnosis:  Final diagnoses:  [R23.8] Scalp irritation (Primary)     Tova Stack discharge to home/self care.    - Condition at discharge: Good  - Mode of Discharge: by walking out   - The discharge instructions were discussed with the patient/parent.  - They state an understanding of the discharge instructions.  - Recommended tx for dry scalp  - Advised to f/u with pcp within 1-2 days. ED precautions given.     ED Prescriptions     None          Follow-up Information     Follow up With Specialties Details Why Contact Info    Primary Care Provider within 1-2 days   Go in 1 day      [facility] - Emergency Dept Emergency Medicine  If symptoms worsen return to ED immediately [address] [facility] [id number] [phone]            (Please note that this chart was completed via voice to text dictation. There may be typographical errors or substitutions that are unintentional, or uncorrected. Every attempt was made to proofread the chart prior to completion. If there are any questions, please contact the provider for final clarification).       DALE Espino  05/20/22 0958

## 2022-05-27 ENCOUNTER — TELEPHONE (OUTPATIENT)
Dept: ORTHOPEDICS | Facility: CLINIC | Age: 52
End: 2022-05-27
Payer: MEDICAID

## 2022-05-27 DIAGNOSIS — M25.551 HIP PAIN, BILATERAL: Primary | ICD-10-CM

## 2022-05-27 DIAGNOSIS — M25.552 HIP PAIN, BILATERAL: Primary | ICD-10-CM

## 2022-05-27 NOTE — PROGRESS NOTES
Subjective:      Patient ID: Tova Stack is a 51 y.o. female.    Chief Complaint: Numbness and Pain of the Left Hip and Numbness and Pain of the Right Hip      HPI  (Charlene)    She has pain laterally in both hips that is constant and worse with standing/walking/prolonged sitting. She also has LBP and right leg pain lateral to her knee with intermittent numbness in right foot. She rates her pain as a 10 on a scale of 1-10. Minimal relief with changing positions.     She is taking mobic with some relief- it makes her sleepy. She has been to PT for her back in the past year. No hip injections or surgery.     She saw neurosurgery for her back in March (Dr. Cid) and was to get CT of lumbar spine on CD sent to her. MRI showed left lateral disc herniation at L5-S1 with some foraminal stenosis.       Past Medical History:   Diagnosis Date    Hypertension          Current Outpatient Medications:     amLODIPine (NORVASC) 5 MG tablet, Take 5 mg by mouth., Disp: , Rfl:     cholecalciferol, vitamin D3, (VITAMIN D3) 50 mcg (2,000 unit) Cap, Take 50 mcg by mouth., Disp: , Rfl:     ferrous sulfate (FEOSOL) 325 mg (65 mg iron) Tab tablet, , Disp: , Rfl:     hydrOXYzine pamoate (VISTARIL) 25 MG Cap, Take 1 capsule (25 mg total) by mouth 3 (three) times daily as needed (anxiety)., Disp: 90 capsule, Rfl: 3    meloxicam (MOBIC) 15 MG tablet, Take 1 tablet (15 mg total) by mouth daily as needed for Pain. Take with food, avoid other NSAIDs, Disp: 30 tablet, Rfl: 3    ferrous sulfate 325 (65 FE) MG EC tablet, Take 1 tablet (325 mg total) by mouth every Mon, Wed, Fri., Disp: 39 tablet, Rfl: 3    SUPREP BOWEL PREP KIT 17.5-3.13-1.6 gram SolR, , Disp: , Rfl:     Review of patient's allergies indicates:  No Known Allergies    Review of Systems   Constitutional: Negative for chills, fever, night sweats and weight gain.   Gastrointestinal: Negative for bowel incontinence, nausea and vomiting.   Genitourinary: Negative for bladder  incontinence.   Neurological: Negative for disturbances in coordination and loss of balance.         Objective:        Wt 81.5 kg (179 lb 10.8 oz)   LMP  (LMP Unknown)   Breastfeeding No   BMI 32.86 kg/m²     General    Vitals reviewed.  Constitutional: She is oriented to person, place, and time. She appears well-developed and well-nourished.   Pulmonary/Chest: Effort normal.   Abdominal: She exhibits no distension.   Neurological: She is alert and oriented to person, place, and time.   Psychiatric: She has a normal mood and affect. Her behavior is normal. Judgment and thought content normal.           Body habitus is::normal.   The patient walks without a limp.     On exam of the lumbar spine, she has diffuse lower lumbar tenderness.     muscle tone normal without spasm, limited range of motion with pain  Pain in flexion. and Pain in extension.    Strength testing of the bilateral LEs shows  Right hip abduction:  +5/5  Left hip abduction:  +5/5  Right hip flexion:  +5/5   Left hip flexion:  +5/5  Right hip extensors:  +5/5  Left hip extensors:  +5/5  Right quadriceps:  +5/5  Left quadriceps:  +5/5  Right hamstring:  +5/5  Left hamstring:  +5/5  Right dorsiflexion:  +5/5  Left dorsiflexion:  +5/5  Right plantar flexion:  +5/5  Left plantar flexion:+5/5 Right EHL:  +5/5   Left EHL:  +5/5      RIGHT HIP EXAM:  There is::no local tenderness.  Range of motion- good ROM of right hip with LBP, no groin pain  Pain with rotation positive for LBP, no groin pain  Sciatic tension findings positive.  Shortening/lengthening compared to the contralateral side exam deferred.  Sensory normal.      LEFT HIP EXAM:  There is::no local tenderness.  Range of motion- good ROM of left hip with LBP, no groin pain  Pain with rotation positive for LBP, no groin pain  Sciatic tension findings positive.  Shortening/lengthening compared to the contralateral side exam deferred.  Sensory normal.      XRAY INTERPRETATION:   X-rays of bilateral  hips dated 5/30/22 are personally reviewed and show no fracture or dislocation. Good maintenance of joint space in both hips.        Assessment:       Encounter Diagnoses   Name Primary?    Thoracic and lumbosacral neuritis     Bilateral low back pain with right-sided sciatica, unspecified chronicity           Plan:       Tova was seen today for numbness, pain, numbness and pain.    Diagnoses and all orders for this visit:    Thoracic and lumbosacral neuritis    Bilateral low back pain with right-sided sciatica, unspecified chronicity      She has pain laterally in both hips that is constant and worse with standing/walking/prolonged sitting. She also has LBP and right leg pain lateral to her knee with intermittent numbness in right foot.    XRs of both hips show good hip joint space with no fracture/dislocation. Hip exam shows good ROM with pain in lower back, no groin/hip pain.     Current pain appears to be lumbar mediated.     Treatment options reviewed with patient along with above bilateral hip xrays. Following plan made:     - Recommend she follow up with neurosurgery. She was to mail lumbar CT and report to Dr. Cid.   - Given address for neurosurgery at [facility]. Message to Dr. Cid's staff to call her to [facility] where she should mail imaging.   - Continue on mobic from other providers. Reviewed dosing and side effects. Take with food.   - She will f/u with me prn.     Follow up if symptoms worsen or fail to improve.

## 2022-05-30 ENCOUNTER — OFFICE VISIT (OUTPATIENT)
Dept: ORTHOPEDICS | Facility: CLINIC | Age: 52
End: 2022-05-30
Payer: MEDICAID

## 2022-05-30 ENCOUNTER — TELEPHONE (OUTPATIENT)
Dept: NEUROSURGERY | Facility: CLINIC | Age: 52
End: 2022-05-30
Payer: MEDICAID

## 2022-05-30 VITALS — BODY MASS INDEX: 32.86 KG/M2 | WEIGHT: 179.69 LBS

## 2022-05-30 DIAGNOSIS — M54.14 THORACIC AND LUMBOSACRAL NEURITIS: ICD-10-CM

## 2022-05-30 DIAGNOSIS — M54.17 THORACIC AND LUMBOSACRAL NEURITIS: ICD-10-CM

## 2022-05-30 DIAGNOSIS — M54.41 BILATERAL LOW BACK PAIN WITH RIGHT-SIDED SCIATICA, UNSPECIFIED CHRONICITY: ICD-10-CM

## 2022-05-30 PROCEDURE — 3008F PR BODY MASS INDEX (BMI) DOCUMENTED: ICD-10-PCS | Mod: CPTII,,, | Performed by: PHYSICIAN ASSISTANT

## 2022-05-30 PROCEDURE — 1160F RVW MEDS BY RX/DR IN RCRD: CPT | Mod: CPTII,,, | Performed by: PHYSICIAN ASSISTANT

## 2022-05-30 PROCEDURE — 3008F BODY MASS INDEX DOCD: CPT | Mod: CPTII,,, | Performed by: PHYSICIAN ASSISTANT

## 2022-05-30 PROCEDURE — 99213 OFFICE O/P EST LOW 20 MIN: CPT | Mod: S$PBB,,, | Performed by: PHYSICIAN ASSISTANT

## 2022-05-30 PROCEDURE — 99213 PR OFFICE/OUTPT VISIT, EST, LEVL III, 20-29 MIN: ICD-10-PCS | Mod: S$PBB,,, | Performed by: PHYSICIAN ASSISTANT

## 2022-05-30 PROCEDURE — 99999 PR PBB SHADOW E&M-EST. PATIENT-LVL III: CPT | Mod: PBBFAC,,, | Performed by: PHYSICIAN ASSISTANT

## 2022-05-30 PROCEDURE — 99213 OFFICE O/P EST LOW 20 MIN: CPT | Mod: PBBFAC,PN | Performed by: PHYSICIAN ASSISTANT

## 2022-05-30 PROCEDURE — 99999 PR PBB SHADOW E&M-EST. PATIENT-LVL III: ICD-10-PCS | Mod: PBBFAC,,, | Performed by: PHYSICIAN ASSISTANT

## 2022-05-30 PROCEDURE — 1160F PR REVIEW ALL MEDS BY PRESCRIBER/CLIN PHARMACIST DOCUMENTED: ICD-10-PCS | Mod: CPTII,,, | Performed by: PHYSICIAN ASSISTANT

## 2022-05-30 PROCEDURE — 1159F PR MEDICATION LIST DOCUMENTED IN MEDICAL RECORD: ICD-10-PCS | Mod: CPTII,,, | Performed by: PHYSICIAN ASSISTANT

## 2022-05-30 PROCEDURE — 1159F MED LIST DOCD IN RCRD: CPT | Mod: CPTII,,, | Performed by: PHYSICIAN ASSISTANT

## 2022-05-30 NOTE — TELEPHONE ENCOUNTER
Spoke with pt and gave her address to send her imaging to us. Also scheduled her for a virtual f/u appt for 6/13/22.    ----- Message from Sarah Martinez PA-C sent at 5/30/2022  1:48 PM CDT -----  She was supposed to mail a lumbar CT to Dr. Cid with a report. I think I gave her the correct address. Please call her to verify.     Thanks

## 2022-05-30 NOTE — PATIENT INSTRUCTIONS
It was nice to meet you today! I am sorry that you are hurting so much.     Your hip xrays look good. I think the pain in your back and hips is coming from your lower back.     Get the CD from the CT scan of your lumbar spine along with the report and mail them to Dr. Renya Cid. You can mail to:     [facility] [address]  Department of Neurosurgery  Attn: [place] [address]    I will have her staff call you to make sure that is the correct address.     Let me know if there is anything else I can do for you.     Sarah   [phone]

## 2022-06-01 DIAGNOSIS — Z01.419 WELL WOMAN EXAM WITH ROUTINE GYNECOLOGICAL EXAM: Primary | ICD-10-CM

## 2022-06-16 ENCOUNTER — OFFICE VISIT (OUTPATIENT)
Dept: URGENT CARE | Facility: CLINIC | Age: 52
End: 2022-06-16
Payer: MEDICAID

## 2022-06-16 VITALS
HEART RATE: 77 BPM | DIASTOLIC BLOOD PRESSURE: 76 MMHG | HEIGHT: 62 IN | BODY MASS INDEX: 32.64 KG/M2 | RESPIRATION RATE: 18 BRPM | OXYGEN SATURATION: 99 % | WEIGHT: 177.38 LBS | SYSTOLIC BLOOD PRESSURE: 128 MMHG | TEMPERATURE: 98 F

## 2022-06-16 DIAGNOSIS — J02.9 ACUTE PHARYNGITIS, UNSPECIFIED ETIOLOGY: Primary | ICD-10-CM

## 2022-06-16 DIAGNOSIS — R05.9 COUGH: ICD-10-CM

## 2022-06-16 DIAGNOSIS — R09.81 NASAL CONGESTION: ICD-10-CM

## 2022-06-16 LAB — STREP A PCR (OHS): NOT DETECTED

## 2022-06-16 PROCEDURE — 99214 OFFICE O/P EST MOD 30 MIN: CPT | Mod: S$PBB,,, | Performed by: NURSE PRACTITIONER

## 2022-06-16 PROCEDURE — 99213 OFFICE O/P EST LOW 20 MIN: CPT | Mod: PBBFAC | Performed by: NURSE PRACTITIONER

## 2022-06-16 PROCEDURE — 87631 RESP VIRUS 3-5 TARGETS: CPT | Performed by: NURSE PRACTITIONER

## 2022-06-16 PROCEDURE — 99214 PR OFFICE/OUTPT VISIT, EST, LEVL IV, 30-39 MIN: ICD-10-PCS | Mod: S$PBB,,, | Performed by: NURSE PRACTITIONER

## 2022-06-16 RX ORDER — PROMETHAZINE HYDROCHLORIDE AND DEXTROMETHORPHAN HYDROBROMIDE 6.25; 15 MG/5ML; MG/5ML
5 SYRUP ORAL EVERY 6 HOURS PRN
Qty: 100 ML | Refills: 0 | OUTPATIENT
Start: 2022-06-16 | End: 2022-06-19

## 2022-06-16 RX ORDER — AMOXICILLIN 500 MG/1
500 CAPSULE ORAL 3 TIMES DAILY
Qty: 30 CAPSULE | Refills: 0 | Status: SHIPPED | OUTPATIENT
Start: 2022-06-16 | End: 2022-06-26

## 2022-06-16 RX ORDER — LEVOCETIRIZINE DIHYDROCHLORIDE 5 MG/1
5 TABLET, FILM COATED ORAL NIGHTLY
Qty: 30 TABLET | Refills: 0 | Status: SHIPPED | OUTPATIENT
Start: 2022-06-16 | End: 2022-08-10

## 2022-06-16 NOTE — PROGRESS NOTES
"Subjective:       Patient ID: Tova Stack is a 51 y.o. female.    Vitals:  height is 5' 2.01" (1.575 m) and weight is 80.5 kg (177 lb 6.4 oz). Her oral temperature is 98.4 °F (36.9 °C). Her blood pressure is 128/76 and her pulse is 77. Her respiration is 18 and oxygen saturation is 99%.     Chief Complaint: burning feeling (Pt reports in ears and throat, took 2 covid tests at home they were negative)    Pt is a 51 y.o. y.o. female who complains of congestion, sore throat and dry cough for 3 days.  denies  shortness of breath and wheezing. Currently taking nyquil with little relief.       HENT: Positive for congestion and sore throat.    Neck: neck negative.   Cardiovascular: Negative.    Respiratory: Positive for cough.        Objective:      Physical Exam   Constitutional: She is oriented to person, place, and time. She appears well-developed.   HENT:   Head: Normocephalic.   Nose: Congestion present.   Mouth/Throat: Posterior oropharyngeal erythema present.   Eyes: Conjunctivae and EOM are normal. Pupils are equal, round, and reactive to light.   Neck: Neck supple.   Cardiovascular: Normal rate, regular rhythm and normal heart sounds.   Pulmonary/Chest: Effort normal and breath sounds normal.   Musculoskeletal: Normal range of motion.         General: Normal range of motion.   Neurological: She is alert and oriented to person, place, and time.   Skin: Skin is warm and dry.   Psychiatric: Her behavior is normal.   Vitals reviewed.        Assessment:       1. Acute pharyngitis, unspecified etiology    2. Cough    3. Nasal congestion                Plan:         Strep PCR pending, will treat based on clinical symptoms.  Start medication, change toothbrush after 3 days of antibiotic.  Saltwater gargles.  May use acetaminophen alternate with ibuprofen as directed for comfort.  ER precautions.      Acute pharyngitis, unspecified etiology  -     amoxicillin (AMOXIL) 500 MG capsule; Take 1 capsule (500 mg total) by " mouth 3 (three) times daily. for 10 days  Dispense: 30 capsule; Refill: 0    Cough  -     promethazine-dextromethorphan (PROMETHAZINE-DM) 6.25-15 mg/5 mL Syrp; Take 5 mLs by mouth every 6 (six) hours as needed (cough).  Dispense: 100 mL; Refill: 0    Nasal congestion  -     levocetirizine (XYZAL) 5 MG tablet; Take 1 tablet (5 mg total) by mouth every evening.  Dispense: 30 tablet; Refill: 0

## 2022-06-19 ENCOUNTER — HOSPITAL ENCOUNTER (EMERGENCY)
Facility: HOSPITAL | Age: 52
Discharge: HOME | End: 2022-06-19
Attending: INTERNAL MEDICINE
Payer: MEDICAID

## 2022-06-19 VITALS
DIASTOLIC BLOOD PRESSURE: 72 MMHG | WEIGHT: 167.56 LBS | TEMPERATURE: 98 F | SYSTOLIC BLOOD PRESSURE: 146 MMHG | HEART RATE: 82 BPM | OXYGEN SATURATION: 98 % | HEIGHT: 65 IN | RESPIRATION RATE: 18 BRPM | BODY MASS INDEX: 27.92 KG/M2

## 2022-06-19 DIAGNOSIS — U07.1 COVID-19: Primary | ICD-10-CM

## 2022-06-19 LAB
FLUAV AG UPPER RESP QL IA.RAPID: NOT DETECTED
FLUBV AG UPPER RESP QL IA.RAPID: NOT DETECTED
SARS-COV-2 RNA RESP QL NAA+PROBE: DETECTED

## 2022-06-19 PROCEDURE — 99284 EMERGENCY DEPT VISIT MOD MDM: CPT | Mod: 25

## 2022-06-19 PROCEDURE — 87636 SARSCOV2 & INF A&B AMP PRB: CPT | Performed by: PHYSICIAN ASSISTANT

## 2022-06-19 RX ORDER — PROMETHAZINE HYDROCHLORIDE AND DEXTROMETHORPHAN HYDROBROMIDE 6.25; 15 MG/5ML; MG/5ML
5 SYRUP ORAL EVERY 6 HOURS PRN
Qty: 100 ML | Refills: 0 | Status: SHIPPED | OUTPATIENT
Start: 2022-06-19 | End: 2022-06-24

## 2022-06-19 RX ORDER — PROMETHAZINE HYDROCHLORIDE AND DEXTROMETHORPHAN HYDROBROMIDE 6.25; 15 MG/5ML; MG/5ML
5 SYRUP ORAL EVERY 6 HOURS PRN
Qty: 100 ML | Refills: 0 | Status: SHIPPED | OUTPATIENT
Start: 2022-06-19 | End: 2022-06-19 | Stop reason: SDUPTHER

## 2022-06-19 NOTE — DISCHARGE INSTRUCTIONS
Report to Emergency Department if symptoms return or worsen; [facility] - Medicine Clinic Within 1 to 2 days, It is important that you follow up with your primary care provider or specialist if indicated for further evaluation, workup, and treatment as necessary. The exam and treatment you received in Emergency Department was for an urgent problem and NOT INTENDED AS COMPLETE CARE. It is important that you FOLLOW UP with a doctor for ongoing care. If your symptoms become WORSE or you DO NOT IMPROVE and you are unable to reach your health care provider, you should RETURN to the Emergency Department. The Emergency Department provider has provided a PRELIMINARY INTERPRETATION of all your studies. A final interpretation may be done after you are discharged. If a change in your diagnosis or treatment is needed WE WILL CONTACT YOU. It is critical that we have a CURRENT PHONE NUMBER FOR YOU.

## 2022-06-19 NOTE — ED PROVIDER NOTES
Encounter Date: 2022       History     Chief Complaint   Patient presents with    Cough     Pt reports cough, cold, and congestion x  4 days.     50 yo F w/ PMHx significant for HTN, obesity & BELEN presents to ED c/o congestion & cough. Patient reports 4 days ago she began experiencing b/l ear pruritus & scratchy throat & was seen at outside urgent care, where they prescribed her amoxicillin. Reports sore throat & ear pruritus has resolved, but states she is now coughing & had bad congestion at nighttime. Not currently taking anything for symptom relief. Was not tested for COVID or flu at urgent care, but reports taking 2 at-home COVID tests at time of symptom onset. VSS on arrival w/ no signs of respiratory distress.        Review of patient's allergies indicates:  No Known Allergies  Past Medical History:   Diagnosis Date    Hypertension      Past Surgical History:   Procedure Laterality Date     SECTION      4 total    TUBAL LIGATION       Family History   Problem Relation Age of Onset    Hypertension Mother     Diabetes Mother     Hypertension Father     Heart disease Sister     Hypertension Sister      Social History     Tobacco Use    Smoking status: Former Smoker     Types: Cigarettes    Smokeless tobacco: Former User     Quit date: 2019    Tobacco comment: quit 3 yrs ago   Substance Use Topics    Alcohol use: Never    Drug use: Not Currently     Comment:      Review of Systems   Constitutional: Positive for fatigue. Negative for appetite change, chills, diaphoresis and fever.   HENT: Positive for congestion, postnasal drip, rhinorrhea and sinus pressure. Negative for ear discharge, ear pain, sinus pain, sneezing, sore throat, tinnitus and voice change.    Respiratory: Positive for cough. Negative for shortness of breath, wheezing and stridor.    Cardiovascular: Negative for chest pain, palpitations and leg swelling.   Gastrointestinal: Negative for abdominal pain, diarrhea,  nausea and vomiting.   Endocrine: Negative for polydipsia, polyphagia and polyuria.   Genitourinary: Negative for dysuria and hematuria.   Musculoskeletal: Negative for arthralgias, back pain, myalgias and neck stiffness.   Skin: Negative for rash.   Allergic/Immunologic: Positive for environmental allergies. Negative for immunocompromised state.   Neurological: Negative for dizziness, syncope, weakness, light-headedness and headaches.   Hematological: Negative for adenopathy.   All other systems reviewed and are negative.      Physical Exam     Initial Vitals [06/19/22 1218]   BP Pulse Resp Temp SpO2   (!) 146/72 82 18 98.2 °F (36.8 °C) 98 %      MAP       --         Physical Exam    Nursing note and vitals reviewed.  Constitutional: She appears well-developed and well-nourished. No distress.   HENT:   Head: Normocephalic and atraumatic.   Right Ear: External ear normal.   Left Ear: External ear normal.   Nose: Nose normal.   Mouth/Throat: Oropharynx is clear and moist. No oropharyngeal exudate.   Eyes: Conjunctivae and EOM are normal. Pupils are equal, round, and reactive to light.   Neck: Neck supple.   Normal range of motion.  Cardiovascular: Normal rate, regular rhythm, normal heart sounds and intact distal pulses. Exam reveals no gallop and no friction rub.    No murmur heard.  Pulmonary/Chest: Breath sounds normal. No respiratory distress. She has no wheezes. She has no rhonchi. She has no rales. She exhibits no tenderness.   Abdominal: Abdomen is soft. Bowel sounds are normal. She exhibits no distension and no mass. There is no abdominal tenderness. There is no rebound and no guarding.   Musculoskeletal:         General: No tenderness or edema. Normal range of motion.      Cervical back: Normal range of motion and neck supple.     Neurological: She is alert and oriented to person, place, and time. She has normal strength.   Skin: Skin is warm and dry. Capillary refill takes less than 2 seconds. No rash  noted.   Psychiatric: She has a normal mood and affect. Thought content normal.         ED Course   Procedures  Labs Reviewed   COVID/FLU A&B PCR - Abnormal; Notable for the following components:       Result Value    SARS-CoV-2 PCR Detected (*)     All other components within normal limits          Imaging Results          X-Ray Chest 1 View (Final result)  Result time 06/19/22 12:49:02    Final result by Elvis Jean-Baptiste MD (06/19/22 12:49:02)                 Impression:      No acute cardiopulmonary process.      Electronically signed by: Elvis Jean-Baptiste  Date:    06/19/2022  Time:    12:49             Narrative:    EXAMINATION:  XR CHEST 1 VIEW    CLINICAL HISTORY:  cough;    TECHNIQUE:  Portable frontal view of the chest.    COMPARISON:  Chest radiograph 04/25/2020.    FINDINGS:  The cardiomediastinal silhouette is stable in size and contour. Pulmonary vascularity is within normal limits. The lungs are well-inflated and are without focal consolidation, pleural effusion, or pneumothorax.    The imaged osseous structures and soft tissues are without acute abnormality.                                 Medications - No data to display  Medical Decision Making:   Clinical Tests:   Lab Tests: Ordered and Reviewed  Radiological Study: Ordered and Reviewed  Patient is COVID positive. CXR unremarkable w/o evidence of infectious process.                      Clinical Impression:   Final diagnoses:  [U07.1] COVID-19 (Primary)          ED Disposition Condition    Discharge Stable        ED Prescriptions     Medication Sig Dispense Start Date End Date Auth. Provider    promethazine-dextromethorphan (PROMETHAZINE-DM) 6.25-15 mg/5 mL Syrp Take 5 mLs by mouth every 6 (six) hours as needed (cough). 100 mL 6/19/2022 6/24/2022 DALE Bob        Follow-up Information     Follow up With Specialties Details Why Contact Info    Shira Funk NP Nurse Practitioner In 3 days  [address] [facility]  [address]  [phone]      [facility] Emergency Dept Emergency Medicine  If symptoms worsen [address] [facility] [id number] [phone]           DALE Bob  06/19/22 1472

## 2022-06-20 LAB — CRC RECOMMENDATION EXT: NORMAL

## 2022-06-23 ENCOUNTER — TELEPHONE (OUTPATIENT)
Dept: NEUROSURGERY | Facility: CLINIC | Age: 52
End: 2022-06-23
Payer: MEDICAID

## 2022-06-27 ENCOUNTER — PROCEDURE VISIT (OUTPATIENT)
Dept: UROLOGY | Facility: CLINIC | Age: 52
End: 2022-06-27
Payer: MEDICAID

## 2022-06-27 VITALS
SYSTOLIC BLOOD PRESSURE: 123 MMHG | RESPIRATION RATE: 20 BRPM | HEART RATE: 83 BPM | WEIGHT: 178 LBS | BODY MASS INDEX: 32.76 KG/M2 | TEMPERATURE: 98 F | DIASTOLIC BLOOD PRESSURE: 81 MMHG | HEIGHT: 62 IN | OXYGEN SATURATION: 99 %

## 2022-06-27 DIAGNOSIS — R31.21 ASYMPTOMATIC MICROSCOPIC HEMATURIA: Primary | ICD-10-CM

## 2022-06-27 PROCEDURE — 52000 CYSTOURETHROSCOPY: CPT | Mod: PBBFAC | Performed by: UROLOGY

## 2022-06-27 RX ORDER — CIPROFLOXACIN 500 MG/1
500 TABLET ORAL
Status: COMPLETED | OUTPATIENT
Start: 2022-06-27 | End: 2022-06-27

## 2022-06-27 RX ADMIN — CIPROFLOXACIN 500 MG: 500 TABLET ORAL at 01:06

## 2022-06-27 NOTE — PROGRESS NOTES
Patient was seen by Dr. Maldonado for cystoscopy.  Consent signed and Time out performed.  RTC prn.

## 2022-06-27 NOTE — PROGRESS NOTES
Chief Complaint:   Chief Complaint   Patient presents with    cystoscopy         HPI:  Ms. Stack is a 51-year-old female who presents today as a referral for microscopic hematuria.  No history of gross hematuria, dysuria, irritative obstructive LUTS.  She is a nonsmoker.  There is a family history of prostate cancer in an uncle.  She does not take any anticoagulants.  Previous CT was negative for  pathology.  Here today for cysto.    Allergies:  Review of patient's allergies indicates:  No Known Allergies    Medications:  Current Outpatient Medications   Medication Sig Dispense Refill    amLODIPine (NORVASC) 5 MG tablet Take 5 mg by mouth.      cholecalciferol, vitamin D3, (VITAMIN D3) 50 mcg (2,000 unit) Cap Take 50 mcg by mouth.      ferrous sulfate (FEOSOL) 325 mg (65 mg iron) Tab tablet       ferrous sulfate 325 (65 FE) MG EC tablet Take 1 tablet (325 mg total) by mouth every Mon, Wed, Fri. 39 tablet 3    hydrOXYzine pamoate (VISTARIL) 25 MG Cap Take 1 capsule (25 mg total) by mouth 3 (three) times daily as needed (anxiety). 90 capsule 3    levocetirizine (XYZAL) 5 MG tablet Take 1 tablet (5 mg total) by mouth every evening. 30 tablet 0    meloxicam (MOBIC) 15 MG tablet Take 1 tablet (15 mg total) by mouth daily as needed for Pain. Take with food, avoid other NSAIDs 30 tablet 3    SUPREP BOWEL PREP KIT 17.5-3.13-1.6 gram SolR        No current facility-administered medications for this visit.       Review of Systems:  General: No fever, chills, fatigability, or weight loss.  Skin: No rashes, itching, or changes in color or texture of skin.  Chest: Denies THOMASON, cyanosis, wheezing, cough, and sputum production.  Abdomen: Appetite fine. No weight loss. Denies diarrhea, abdominal pain, hematemesis, or blood in stool.  Musculoskeletal: No joint stiffness or swelling. Denies back pain.  : As above.  All other review of systems negative.    PMH:  Past Medical History:   Diagnosis Date    Hypertension         PSH:  Past Surgical History:   Procedure Laterality Date     SECTION      4 total    TUBAL LIGATION         FamHx:  Family History   Problem Relation Age of Onset    Hypertension Mother     Diabetes Mother     Hypertension Father     Heart disease Sister     Hypertension Sister        SocHx:  Social History     Socioeconomic History    Marital status: Single   Tobacco Use    Smoking status: Former Smoker     Types: Cigarettes    Smokeless tobacco: Former User     Quit date: 2019    Tobacco comment: quit 3 yrs ago   Substance and Sexual Activity    Alcohol use: Never    Drug use: Not Currently     Comment:     Sexual activity: Yes     Partners: Male     Social Determinants of Health     Physical Activity: Inactive    Days of Exercise per Week: 0 days    Minutes of Exercise per Session: 0 min       Physical Exam:  There were no vitals filed for this visit.  General: A&Ox3, no apparent distress, no deformities  Neck: No masses, normal thyroid  Lungs: CTA sangita, no use of accessory muscles  Heart: RRR, no arrhythmias  Abdomen: Soft, NT, ND, no masses, no hernias, no hepatosplenomegaly  Lymphatic: Neck and groin nodes negative  Skin: The skin is warm and dry. No jaundice.  Ext: No c/c/e.  GUFemale: External genitalia normal. No lesions. Meatus normal size and location. Urethra normal. No masses. Bladder normal. No fullness or masses. Vagina normal without discharge or lesions.     Procedure:  Informed consent was obtained. She was premedicated with an oral antibiotic. The vagina was prepped with topical betadine. A 17Fr flexible cystoscope was advanced per urethra into the bladder under direct vision. Bilateral Uo's were in orthotopic location. All bladder surfaces were evaluated. There was no evidence of papillary tumor, trabeculation, diverticula or stone. No urethral pathology. The scope was removed. She tolerated the procedure well.      Imaging:   CTU  IMPRESSION:  No etiology of  hematuria identified     Likely leiomyomatous uterus although nonspecific      Impression:  Problem List Items Addressed This Visit        Renal/    Hematuria - Primary            Plan:  - CT and cysto negative. No significant risk factors. Monitor yearly UA/micro. RTC prn      May Maldonado MD  6/27/2022  Urology

## 2022-07-14 ENCOUNTER — PATIENT OUTREACH (OUTPATIENT)
Dept: ADMINISTRATIVE | Facility: HOSPITAL | Age: 52
End: 2022-07-14
Payer: MEDICAID

## 2022-07-14 NOTE — PROGRESS NOTES
Population Health. Out Reach.  The following record(s)  below were uploaded for Health Maintenance .    6/20/22 COLONOSCOPY

## 2022-07-25 ENCOUNTER — OFFICE VISIT (OUTPATIENT)
Dept: NEUROSURGERY | Facility: CLINIC | Age: 52
End: 2022-07-25
Payer: MEDICAID

## 2022-07-25 DIAGNOSIS — M47.816 SPONDYLOSIS OF LUMBAR SPINE: ICD-10-CM

## 2022-07-25 DIAGNOSIS — M54.50 DORSALGIA OF LUMBAR REGION: ICD-10-CM

## 2022-07-25 DIAGNOSIS — M51.37 DDD (DEGENERATIVE DISC DISEASE), LUMBOSACRAL: ICD-10-CM

## 2022-07-25 DIAGNOSIS — M54.17 LUMBOSACRAL RADICULOPATHY: Primary | ICD-10-CM

## 2022-07-25 PROCEDURE — 99212 OFFICE O/P EST SF 10 MIN: CPT | Mod: 95,,, | Performed by: STUDENT IN AN ORGANIZED HEALTH CARE EDUCATION/TRAINING PROGRAM

## 2022-07-25 PROCEDURE — 99212 PR OFFICE/OUTPT VISIT, EST, LEVL II, 10-19 MIN: ICD-10-PCS | Mod: 95,,, | Performed by: STUDENT IN AN ORGANIZED HEALTH CARE EDUCATION/TRAINING PROGRAM

## 2022-07-25 PROCEDURE — 1160F RVW MEDS BY RX/DR IN RCRD: CPT | Mod: CPTII,95,, | Performed by: STUDENT IN AN ORGANIZED HEALTH CARE EDUCATION/TRAINING PROGRAM

## 2022-07-25 PROCEDURE — 1159F MED LIST DOCD IN RCRD: CPT | Mod: CPTII,95,, | Performed by: STUDENT IN AN ORGANIZED HEALTH CARE EDUCATION/TRAINING PROGRAM

## 2022-07-25 PROCEDURE — 1160F PR REVIEW ALL MEDS BY PRESCRIBER/CLIN PHARMACIST DOCUMENTED: ICD-10-PCS | Mod: CPTII,95,, | Performed by: STUDENT IN AN ORGANIZED HEALTH CARE EDUCATION/TRAINING PROGRAM

## 2022-07-25 PROCEDURE — 1159F PR MEDICATION LIST DOCUMENTED IN MEDICAL RECORD: ICD-10-PCS | Mod: CPTII,95,, | Performed by: STUDENT IN AN ORGANIZED HEALTH CARE EDUCATION/TRAINING PROGRAM

## 2022-07-25 NOTE — PROGRESS NOTES
"The patient location is: [place]  The chief complaint leading to consultation is: follow up lower back pain    Visit type: audiovisual    Face to Face time with patient: 15 min  30 minutes of total time spent on the encounter, which includes face to face time and non-face to face time preparing to see the patient (eg, review of tests), Obtaining and/or reviewing separately obtained history, Documenting clinical information in the electronic or other health record, Independently interpreting results (not separately reported) and communicating results to the patient/family/caregiver, or Care coordination (not separately reported).         Each patient to whom he or she provides medical services by telemedicine is:  (1) informed of the relationship between the physician and patient and the respective role of any other health care provider with respect to management of the patient; and (2) notified that he or she may decline to receive medical services by telemedicine and may withdraw from such care at any time.    Notes:     Digital Medicine: Video Consult    Tova Stack is a 52 year old female initially seen by me on 3/14/22 for acute on chronic lower back pain.  Per my initial HPI, "that radiates to the right leg, sometimes to the knee and occasionally to the foot along the outside of the leg.  Sometimes feels the leg give out when walking. Sometimes can go days to weeks without feeling severe and sharp pain.  She does have pain in the back daily that is described as sore and aching.  Standing for hours and walking long distances exacerbate burning pain.  Sitting for long periods & lying down makes her feel achy & sore.  Also has bilateral knee & hip pain with prior right knee swelling and pain.  She has tried physical therapy with some benefit (completed 2 months ago).  Never seen a chiropractor or had injections.  Currently taking robaxin with minimal benefit and occasionally mobic"       In the interim, all " imaging now uploaded in our system for review.  She reports persistent lower back pain across her back and in bilateral hips.  In the last month, she reports shooting pain radiating to the left leg to down to the bottom of her left foot.  She is not able to work due to her symptoms.      Currently taking mobic & recently prescribed tramadol - some benefit but medications make her drowsy and she does not want to continue taking.    A/P- 53 yo female with lumbar spondylosis with left foraminal stenosis at L5-S1 now with radicular pain in that distribution.  Given the change in symptoms, will need repeat physical exam.  Will also obtain plain films with flexion/extension.  - RTC with XR L spine for surgical discussion    Patient Active Problem List   Diagnosis    Generalized anxiety disorder    Anemia    Hematuria    Hypertension    Low back pain    Obesity    Prediabetes    Vitamin D deficiency    Osteoarthritis, hip, bilateral    Lumbar degenerative disc disease    Lumbar spondylosis    Right knee pain    Right ankle pain       Past Medical History:   Diagnosis Date    Hypertension     Personal history of colonic polyps 06/20/2022       Family History   Problem Relation Age of Onset    Hypertension Mother     Diabetes Mother     Hypertension Father     Heart disease Sister     Hypertension Sister     Stroke Sister        Social History     Socioeconomic History    Marital status: Single   Tobacco Use    Smoking status: Former Smoker     Types: Cigarettes    Smokeless tobacco: Former User     Quit date: 1/1/2019    Tobacco comment: quit 3 yrs ago   Substance and Sexual Activity    Alcohol use: Never    Drug use: Not Currently     Comment: 1995    Sexual activity: Yes     Partners: Male     Social Determinants of Health     Physical Activity: Inactive    Days of Exercise per Week: 0 days    Minutes of Exercise per Session: 0 min       Review of patient's allergies indicates:  No Known  Allergies      Current Outpatient Medications:     amLODIPine (NORVASC) 5 MG tablet, Take 5 mg by mouth., Disp: , Rfl:     cholecalciferol, vitamin D3, (VITAMIN D3) 50 mcg (2,000 unit) Cap, Take 50 mcg by mouth., Disp: , Rfl:     ferrous sulfate (FEOSOL) 325 mg (65 mg iron) Tab tablet, , Disp: , Rfl:     ferrous sulfate 325 (65 FE) MG EC tablet, Take 1 tablet (325 mg total) by mouth every Mon, Wed, Fri. (Patient not taking: Reported on 6/27/2022), Disp: 39 tablet, Rfl: 3    hydrOXYzine pamoate (VISTARIL) 25 MG Cap, Take 1 capsule (25 mg total) by mouth 3 (three) times daily as needed (anxiety)., Disp: 90 capsule, Rfl: 3    levocetirizine (XYZAL) 5 MG tablet, Take 1 tablet (5 mg total) by mouth every evening. (Patient not taking: Reported on 6/27/2022), Disp: 30 tablet, Rfl: 0    meloxicam (MOBIC) 15 MG tablet, Take 1 tablet (15 mg total) by mouth daily as needed for Pain. Take with food, avoid other NSAIDs, Disp: 30 tablet, Rfl: 3    SUPREP BOWEL PREP KIT 17.5-3.13-1.6 gram SolR, , Disp: , Rfl:

## 2022-07-29 ENCOUNTER — TELEPHONE (OUTPATIENT)
Dept: NEUROSURGERY | Facility: CLINIC | Age: 52
End: 2022-07-29
Payer: MEDICAID

## 2022-08-10 ENCOUNTER — OFFICE VISIT (OUTPATIENT)
Dept: INTERNAL MEDICINE | Facility: CLINIC | Age: 52
End: 2022-08-10
Payer: MEDICAID

## 2022-08-10 ENCOUNTER — TELEPHONE (OUTPATIENT)
Dept: INTERNAL MEDICINE | Facility: CLINIC | Age: 52
End: 2022-08-10

## 2022-08-10 DIAGNOSIS — I10 HYPERTENSION, UNSPECIFIED TYPE: ICD-10-CM

## 2022-08-10 DIAGNOSIS — Z00.00 WELLNESS EXAMINATION: ICD-10-CM

## 2022-08-10 DIAGNOSIS — B96.81 H PYLORI ULCER: ICD-10-CM

## 2022-08-10 DIAGNOSIS — K27.9 H PYLORI ULCER: ICD-10-CM

## 2022-08-10 DIAGNOSIS — Z12.31 VISIT FOR SCREENING MAMMOGRAM: ICD-10-CM

## 2022-08-10 DIAGNOSIS — B35.0 TINEA CAPITIS: ICD-10-CM

## 2022-08-10 DIAGNOSIS — R31.21 ASYMPTOMATIC MICROSCOPIC HEMATURIA: ICD-10-CM

## 2022-08-10 DIAGNOSIS — R73.02 IGT (IMPAIRED GLUCOSE TOLERANCE): ICD-10-CM

## 2022-08-10 DIAGNOSIS — E55.9 VITAMIN D DEFICIENCY: ICD-10-CM

## 2022-08-10 DIAGNOSIS — M47.816 LUMBAR SPONDYLOSIS: Primary | ICD-10-CM

## 2022-08-10 DIAGNOSIS — Z11.9 SCREENING EXAMINATION FOR INFECTIOUS DISEASE: ICD-10-CM

## 2022-08-10 PROCEDURE — 1159F MED LIST DOCD IN RCRD: CPT | Mod: CPTII,95,, | Performed by: NURSE PRACTITIONER

## 2022-08-10 PROCEDURE — 99214 PR OFFICE/OUTPT VISIT, EST, LEVL IV, 30-39 MIN: ICD-10-PCS | Mod: 95,,, | Performed by: NURSE PRACTITIONER

## 2022-08-10 PROCEDURE — 1159F PR MEDICATION LIST DOCUMENTED IN MEDICAL RECORD: ICD-10-PCS | Mod: CPTII,95,, | Performed by: NURSE PRACTITIONER

## 2022-08-10 PROCEDURE — 99214 OFFICE O/P EST MOD 30 MIN: CPT | Mod: 95,,, | Performed by: NURSE PRACTITIONER

## 2022-08-10 RX ORDER — CEPHALEXIN 500 MG/1
500 CAPSULE ORAL 3 TIMES DAILY
COMMUNITY
Start: 2022-07-17 | End: 2022-08-10

## 2022-08-10 RX ORDER — FAMOTIDINE 40 MG/1
40 TABLET, FILM COATED ORAL NIGHTLY
COMMUNITY
Start: 2022-07-29 | End: 2022-08-10

## 2022-08-10 RX ORDER — GRISEOFULVIN (MICROSIZE) 125 MG/5ML
500 SUSPENSION ORAL DAILY
Qty: 840 ML | Refills: 0 | Status: SHIPPED | OUTPATIENT
Start: 2022-08-10 | End: 2022-09-21

## 2022-08-10 RX ORDER — PANTOPRAZOLE SODIUM 40 MG/1
40 TABLET, DELAYED RELEASE ORAL DAILY
COMMUNITY
Start: 2022-07-29 | End: 2023-06-12

## 2022-08-10 RX ORDER — GRISEOFULVIN (MICROSIZE) 125 MG/5ML
SUSPENSION ORAL
COMMUNITY
Start: 2022-07-29 | End: 2022-08-10 | Stop reason: SDUPTHER

## 2022-08-10 RX ORDER — FAMOTIDINE 40 MG/1
40 TABLET, FILM COATED ORAL
COMMUNITY
End: 2022-08-10

## 2022-08-10 RX ORDER — FERROUS SULFATE 325(65) MG
325 TABLET, DELAYED RELEASE (ENTERIC COATED) ORAL
Qty: 39 TABLET | Refills: 3 | Status: SHIPPED | OUTPATIENT
Start: 2022-08-10 | End: 2023-12-12 | Stop reason: SDUPTHER

## 2022-08-10 RX ORDER — GRISEOFULVIN 250 MG/1
500 TABLET ORAL
COMMUNITY
Start: 2022-08-09 | End: 2022-08-10 | Stop reason: ALTCHOICE

## 2022-08-10 RX ORDER — HYDROXYZINE PAMOATE 25 MG/1
25 CAPSULE ORAL 3 TIMES DAILY PRN
Qty: 90 CAPSULE | Refills: 6 | Status: SHIPPED | OUTPATIENT
Start: 2022-08-10 | End: 2022-12-12 | Stop reason: SDUPTHER

## 2022-08-10 RX ORDER — MELOXICAM 15 MG/1
15 TABLET ORAL DAILY PRN
Qty: 30 TABLET | Refills: 3 | Status: SHIPPED | OUTPATIENT
Start: 2022-08-10 | End: 2023-06-12

## 2022-08-10 RX ORDER — TRAMADOL HYDROCHLORIDE 50 MG/1
50 TABLET ORAL EVERY 8 HOURS PRN
COMMUNITY
Start: 2022-07-17 | End: 2023-06-12

## 2022-08-10 RX ORDER — AMLODIPINE BESYLATE 5 MG/1
5 TABLET ORAL DAILY
Qty: 90 TABLET | Refills: 3 | Status: SHIPPED | OUTPATIENT
Start: 2022-08-10 | End: 2022-12-12 | Stop reason: SDUPTHER

## 2022-08-10 RX ORDER — CLARITHROMYCIN 500 MG/1
TABLET, FILM COATED ORAL
COMMUNITY
Start: 2022-06-29 | End: 2022-08-10

## 2022-08-10 RX ORDER — ACETAMINOPHEN 500 MG
2000 TABLET ORAL DAILY
Qty: 90 CAPSULE | Refills: 3 | Status: SHIPPED | OUTPATIENT
Start: 2022-08-10 | End: 2022-12-14

## 2022-08-10 RX ORDER — PANTOPRAZOLE SODIUM 40 MG/1
40 TABLET, DELAYED RELEASE ORAL
COMMUNITY
End: 2022-08-10 | Stop reason: SDUPTHER

## 2022-08-10 RX ORDER — AMOXICILLIN 500 MG/1
CAPSULE ORAL
COMMUNITY
Start: 2022-06-29 | End: 2022-08-10

## 2022-08-10 NOTE — PROGRESS NOTES
Established Patient - Audio Only Telehealth Visit     The patient location is: home  The chief complaint leading to consultation is: prescription for scalp fungus   Visit type: Virtual visit with audio only (telephone)  Total time spent with patient: 19 minutes       The reason for the audio only service rather than synchronous audio and video virtual visit was related to technical difficulties or patient preference/necessity.     Each patient to whom I provide medical services by telemedicine is:  (1) informed of the relationship between the physician and patient and the respective role of any other health care provider with respect to management of the patient; and (2) notified that they may decline to receive medical services by telemedicine and may withdraw from such care at any time. Patient verbally consented to receive this service via voice-only telephone call.       HPI: 53 yo AAF for 3 month follow up for lumbar and spondylosis.  PMH includes HTN, IGT, anemia, lumbago, anxiety, tobacco abuse. She is requesting prescription griseofulvin liquid suspension. She had prescription, received from ER however was exposed to heat and needed new prescription. Went to ER yesterday however received tablets and not oral form. Insurance will not pay for tablets and requesting oral suspension. She reports she has upcoming visit with neurosurgeon end of this month. Back pain is getting worse. Denies receiving appointment with pain management. Had H pylori ulcer and Dr. Fischer treated with antibiotics. Will need retest end of this month and will follow up with Dr. Fischer regarding. Had cystoscopy completed. Otherwise, no other concerns/ complaints.     Medication List with Changes/Refills   New Medications    GRISEOFULVIN MICROSIZE (GRIFULVIN V) 125 MG/5 ML SUSPENSION    Take 20 mLs (500 mg total) by mouth once daily.   Current Medications    PANTOPRAZOLE (PROTONIX) 40 MG TABLET    Take 40 mg by mouth once daily.     TRAMADOL (ULTRAM) 50 MG TABLET    Take 50 mg by mouth every 8 (eight) hours as needed.   Changed and/or Refilled Medications    Modified Medication Previous Medication    AMLODIPINE (NORVASC) 5 MG TABLET amLODIPine (NORVASC) 5 MG tablet       Take 1 tablet (5 mg total) by mouth once daily.    Take 5 mg by mouth.    CHOLECALCIFEROL, VITAMIN D3, (VITAMIN D3) 50 MCG (2,000 UNIT) CAP CAPSULE cholecalciferol, vitamin D3, (VITAMIN D3) 50 mcg (2,000 unit) Cap       Take 1 capsule (2,000 Units total) by mouth once daily at 6am.    Take 50 mcg by mouth.    FERROUS SULFATE 325 (65 FE) MG EC TABLET ferrous sulfate 325 (65 FE) MG EC tablet       Take 1 tablet (325 mg total) by mouth every Mon, Wed, Fri.    Take 1 tablet (325 mg total) by mouth every Mon, Wed, Fri.    HYDROXYZINE PAMOATE (VISTARIL) 25 MG CAP hydrOXYzine pamoate (VISTARIL) 25 MG Cap       Take 1 capsule (25 mg total) by mouth 3 (three) times daily as needed (anxiety).    Take 1 capsule (25 mg total) by mouth 3 (three) times daily as needed (anxiety).    MELOXICAM (MOBIC) 15 MG TABLET meloxicam (MOBIC) 15 MG tablet       Take 1 tablet (15 mg total) by mouth daily as needed for Pain. Take with food, avoid other NSAIDs    Take 1 tablet (15 mg total) by mouth daily as needed for Pain. Take with food, avoid other NSAIDs   Discontinued Medications    AMOXICILLIN (AMOXIL) 500 MG CAPSULE    TAKE 2 CAPSULES BY MOUTH TWICE A DAY FOR 14 DAYS    CEPHALEXIN (KEFLEX) 500 MG CAPSULE    Take 500 mg by mouth 3 (three) times daily.    CLARITHROMYCIN (BIAXIN) 500 MG TABLET    TAKE 1 TABLET BY MOUTH TWICE A DAY AS DIRECTED FOR 14 DAYS    FAMOTIDINE (PEPCID) 40 MG TABLET    Take 40 mg by mouth nightly.    FAMOTIDINE (PEPCID) 40 MG TABLET    Take 40 mg by mouth.    FERROUS SULFATE (FEOSOL) 325 MG (65 MG IRON) TAB TABLET        GRISEOFULVIN (GRIFULVIN V) 500 MG TABLET    Take 500 mg by mouth.    GRISEOFULVIN MICROSIZE (GRIFULVIN V) 125 MG/5 ML SUSPENSION    SMARTSI Milliliter(s) By  Mouth Twice Daily    LEVOCETIRIZINE (XYZAL) 5 MG TABLET    Take 1 tablet (5 mg total) by mouth every evening.    PANTOPRAZOLE (PROTONIX) 40 MG TABLET    Take 40 mg by mouth.    SUPREP BOWEL PREP KIT 17.5-3.13-1.6 GRAM SOLR              Assessment and plan:    Problem List Items Addressed This Visit        Neuro    Lumbar spondylosis - Primary    Overview     See LDD.           Current Assessment & Plan     Patient had recent visit with Neurosurgeon Dr. Cid in [place] with upcoming appointment for physical examination/ updated XR. Patient states pain is worsening. She is using Meloxicam as needed. She is interested in pain management and denies receiving call/ appointment with Dr. Barton as previously ordered. Will follow up.   Note from orthopedics - Dr. Martinez May 2022 with impression of hip pain likely r/t lumbar spondylosis               Derm    Tinea capitis    Current Assessment & Plan     She reports being seen in ER last month with prescription however needed new prescription due to heat exposure to medication. Requesting new prescription sent to [facility] on [address].            Relevant Medications    griseofulvin microsize (GRIFULVIN V) 125 mg/5 mL suspension       Cardiac/Vascular    Hypertension    Relevant Orders    CBC Auto Differential    Comprehensive Metabolic Panel    Lipid Panel    TSH       Renal/    Hematuria    Current Assessment & Plan     Evaluated by Urology (Dr. Maldonado) 6/27/22 with cystoscopy- benign findings, recommended annual UA/ micro              Endocrine    Vitamin D deficiency    Relevant Orders    Vitamin D       GI    H pylori ulcer    Current Assessment & Plan     She reports completing EGD with Dr. Fischer with H pylori ulcer. Completed antibiotics and due for repeat test 8/24/22- she is waiting on order from Dr. Fischer. Instructed her to follow up with provider as it is important to assure eradication of H pylori. Still on PPI             Other Visit Diagnoses      Visit for screening mammogram        Relevant Orders    Mammo Digital Screening Bilat w/ Duc    IGT (impaired glucose tolerance)        Relevant Orders    Hemoglobin A1C    TSH    Wellness examination        Relevant Orders    CBC Auto Differential    Comprehensive Metabolic Panel    Hemoglobin A1C    Lipid Panel    TSH    Screening examination for infectious disease        Relevant Orders    RPR w/Rflx Titer    HIV 1/2 Ag/Ab (4th Gen)    Hepatitis Panel, Acute             Follow up in 4 months with labs.                      This service was not originating from a related E/M service pro within the previous 7 days nor will  to an E/M service or procedure within the next 24 hours or my soonest available appointment.  Prevailing standard of care was able to be met in this audio-only visit.

## 2022-08-10 NOTE — TELEPHONE ENCOUNTER
Please follow up on referral to Dr. Barton ordered at last visit May 10, 2022. Patient denies receiving call/ appointment.     Thank you

## 2022-08-10 NOTE — ASSESSMENT & PLAN NOTE
She reports completing EGD with Dr. Fischer with H pylori ulcer. Completed antibiotics and due for repeat test 8/24/22- she is waiting on order from Dr. Fischer. Instructed her to follow up with provider as it is important to assure eradication of H pylori. Still on PPI

## 2022-08-10 NOTE — ASSESSMENT & PLAN NOTE
Patient had recent visit with Neurosurgeon Dr. Cid in [place] with upcoming appointment for physical examination/ updated XR. Patient states pain is worsening. She is using Meloxicam as needed. She is interested in pain management and denies receiving call/ appointment with Dr. Barton as previously ordered. Will follow up.

## 2022-08-10 NOTE — ASSESSMENT & PLAN NOTE
Evaluated by Urology (Dr. Maldonado) 6/27/22 with cystoscopy- benign findings, recommended annual UA/ micro

## 2022-08-10 NOTE — ASSESSMENT & PLAN NOTE
She reports being seen in ER last month with prescription however needed new prescription due to heat exposure to medication. Requesting new prescription sent to [facility] on [address].

## 2022-08-16 NOTE — TELEPHONE ENCOUNTER
Spoke with staff no referral received. I faxed referral manually to [phone] per staff request.

## 2022-08-22 ENCOUNTER — HOSPITAL ENCOUNTER (OUTPATIENT)
Dept: RADIOLOGY | Facility: HOSPITAL | Age: 52
Discharge: HOME | End: 2022-08-22
Attending: STUDENT IN AN ORGANIZED HEALTH CARE EDUCATION/TRAINING PROGRAM
Payer: MEDICAID

## 2022-08-22 ENCOUNTER — OFFICE VISIT (OUTPATIENT)
Dept: NEUROSURGERY | Facility: CLINIC | Age: 52
End: 2022-08-22
Payer: MEDICAID

## 2022-08-22 VITALS — TEMPERATURE: 99 F | DIASTOLIC BLOOD PRESSURE: 83 MMHG | HEART RATE: 87 BPM | SYSTOLIC BLOOD PRESSURE: 134 MMHG

## 2022-08-22 DIAGNOSIS — M25.562 CHRONIC PAIN OF LEFT KNEE: ICD-10-CM

## 2022-08-22 DIAGNOSIS — M54.17 LUMBOSACRAL RADICULOPATHY: ICD-10-CM

## 2022-08-22 DIAGNOSIS — M25.551 BILATERAL HIP PAIN: ICD-10-CM

## 2022-08-22 DIAGNOSIS — G89.29 CHRONIC PAIN OF LEFT KNEE: ICD-10-CM

## 2022-08-22 DIAGNOSIS — M47.816 SPONDYLOSIS OF LUMBAR SPINE: ICD-10-CM

## 2022-08-22 DIAGNOSIS — M25.552 BILATERAL HIP PAIN: ICD-10-CM

## 2022-08-22 DIAGNOSIS — M47.816 SPONDYLOSIS OF LUMBAR SPINE: Primary | ICD-10-CM

## 2022-08-22 PROCEDURE — 99999 PR PBB SHADOW E&M-EST. PATIENT-LVL III: ICD-10-PCS | Mod: PBBFAC,,, | Performed by: STUDENT IN AN ORGANIZED HEALTH CARE EDUCATION/TRAINING PROGRAM

## 2022-08-22 PROCEDURE — 99213 PR OFFICE/OUTPT VISIT, EST, LEVL III, 20-29 MIN: ICD-10-PCS | Mod: S$PBB,,, | Performed by: STUDENT IN AN ORGANIZED HEALTH CARE EDUCATION/TRAINING PROGRAM

## 2022-08-22 PROCEDURE — 1159F MED LIST DOCD IN RCRD: CPT | Mod: CPTII,,, | Performed by: STUDENT IN AN ORGANIZED HEALTH CARE EDUCATION/TRAINING PROGRAM

## 2022-08-22 PROCEDURE — 99999 PR PBB SHADOW E&M-EST. PATIENT-LVL III: CPT | Mod: PBBFAC,,, | Performed by: STUDENT IN AN ORGANIZED HEALTH CARE EDUCATION/TRAINING PROGRAM

## 2022-08-22 PROCEDURE — 99213 OFFICE O/P EST LOW 20 MIN: CPT | Mod: S$PBB,,, | Performed by: STUDENT IN AN ORGANIZED HEALTH CARE EDUCATION/TRAINING PROGRAM

## 2022-08-22 PROCEDURE — 1160F PR REVIEW ALL MEDS BY PRESCRIBER/CLIN PHARMACIST DOCUMENTED: ICD-10-PCS | Mod: CPTII,,, | Performed by: STUDENT IN AN ORGANIZED HEALTH CARE EDUCATION/TRAINING PROGRAM

## 2022-08-22 PROCEDURE — 72114 XR LUMBAR SPINE 5 VIEW WITH FLEX AND EXT: ICD-10-PCS | Mod: 26,,, | Performed by: RADIOLOGY

## 2022-08-22 PROCEDURE — 3075F SYST BP GE 130 - 139MM HG: CPT | Mod: CPTII,,, | Performed by: STUDENT IN AN ORGANIZED HEALTH CARE EDUCATION/TRAINING PROGRAM

## 2022-08-22 PROCEDURE — 3079F PR MOST RECENT DIASTOLIC BLOOD PRESSURE 80-89 MM HG: ICD-10-PCS | Mod: CPTII,,, | Performed by: STUDENT IN AN ORGANIZED HEALTH CARE EDUCATION/TRAINING PROGRAM

## 2022-08-22 PROCEDURE — 1160F RVW MEDS BY RX/DR IN RCRD: CPT | Mod: CPTII,,, | Performed by: STUDENT IN AN ORGANIZED HEALTH CARE EDUCATION/TRAINING PROGRAM

## 2022-08-22 PROCEDURE — 99213 OFFICE O/P EST LOW 20 MIN: CPT | Mod: PBBFAC | Performed by: STUDENT IN AN ORGANIZED HEALTH CARE EDUCATION/TRAINING PROGRAM

## 2022-08-22 PROCEDURE — 3079F DIAST BP 80-89 MM HG: CPT | Mod: CPTII,,, | Performed by: STUDENT IN AN ORGANIZED HEALTH CARE EDUCATION/TRAINING PROGRAM

## 2022-08-22 PROCEDURE — 72114 X-RAY EXAM L-S SPINE BENDING: CPT | Mod: 26,,, | Performed by: RADIOLOGY

## 2022-08-22 PROCEDURE — 3075F PR MOST RECENT SYSTOLIC BLOOD PRESS GE 130-139MM HG: ICD-10-PCS | Mod: CPTII,,, | Performed by: STUDENT IN AN ORGANIZED HEALTH CARE EDUCATION/TRAINING PROGRAM

## 2022-08-22 PROCEDURE — 72114 X-RAY EXAM L-S SPINE BENDING: CPT | Mod: TC

## 2022-08-22 PROCEDURE — 1159F PR MEDICATION LIST DOCUMENTED IN MEDICAL RECORD: ICD-10-PCS | Mod: CPTII,,, | Performed by: STUDENT IN AN ORGANIZED HEALTH CARE EDUCATION/TRAINING PROGRAM

## 2022-08-22 NOTE — PROGRESS NOTES
"Neurosurgery  Established Patient    SUBJECTIVE:     History of Present Illness:  Tova Stack is a 52 year old female initially seen by me on 3/14/22 for acute on chronic lower back pain with lumbar spondylosis with left foraminal stenosis at L5-S1.  Per my initial HPI, "that radiates to the right leg, sometimes to the knee and occasionally to the foot along the outside of the leg.  Sometimes feels the leg give out when walking. Sometimes can go days to weeks without feeling severe and sharp pain.  She does have pain in the back daily that is described as sore and aching.  Standing for hours and walking long distances exacerbate burning pain.  Sitting for long periods & lying down makes her feel achy & sore.  Also has bilateral knee & hip pain with prior right knee swelling and pain.  She has tried physical therapy with some benefit (completed 2 months ago).  Never seen a chiropractor or had injections.  Currently taking robaxin with minimal benefit and occasionally mobic"     At her last follow up appointment on 7/25/22, she reported persistent lower back pain across her back and in bilateral hips and new shooting pain radiating to the left leg to down to the bottom of her left foot for approximately 1 month.      She presents to clinic today with her  for repeat physical exam and ongoing surgical discussions.   When laying down, she has pain down the left lateral leg to the ankle that "feels like a dharmesh going down her leg". Stabbing sensation in the bottom of left foot and tingling sensation in the left foot.  She reports burning sensation in her joints and stabbing pain from back to left hip.  Standing for extended periods or laying flat exacerbates, also sitting prolonged periods. Back and bilateral (L>R) hips cause here most severe pain overall, although for past 3 days, pain is mainly in the left knee and left hip, rated 9/10.  The knee pain is associated with focal burning sensation in the knee and " she reports periodic left knee swelling for many years followed by her PCP.        Review of patient's allergies indicates:  No Known Allergies    Current Outpatient Medications   Medication Sig Dispense Refill    amLODIPine (NORVASC) 5 MG tablet Take 1 tablet (5 mg total) by mouth once daily. 90 tablet 3    cholecalciferol, vitamin D3, (VITAMIN D3) 50 mcg (2,000 unit) Cap capsule Take 1 capsule (2,000 Units total) by mouth once daily at 6am. 90 capsule 3    ferrous sulfate 325 (65 FE) MG EC tablet Take 1 tablet (325 mg total) by mouth every Mon, Wed, Fri. 39 tablet 3    griseofulvin microsize (GRIFULVIN V) 125 mg/5 mL suspension Take 20 mLs (500 mg total) by mouth once daily. 840 mL 0    hydrOXYzine pamoate (VISTARIL) 25 MG Cap Take 1 capsule (25 mg total) by mouth 3 (three) times daily as needed (anxiety). 90 capsule 6    meloxicam (MOBIC) 15 MG tablet Take 1 tablet (15 mg total) by mouth daily as needed for Pain. Take with food, avoid other NSAIDs 30 tablet 3    pantoprazole (PROTONIX) 40 MG tablet Take 40 mg by mouth once daily.      traMADoL (ULTRAM) 50 mg tablet Take 50 mg by mouth every 8 (eight) hours as needed.       No current facility-administered medications for this visit.       Past Medical History:   Diagnosis Date    Hypertension     Personal history of colonic polyps 2022     Past Surgical History:   Procedure Laterality Date     SECTION      4 total    COLONOSCOPY  2022    TUBAL LIGATION       Family History     Problem Relation (Age of Onset)    Diabetes Mother    Heart disease Sister    Hypertension Mother, Father, Sister    Stroke Sister        Social History     Socioeconomic History    Marital status: Single   Tobacco Use    Smoking status: Former Smoker     Types: Cigarettes    Smokeless tobacco: Former User     Quit date: 2019    Tobacco comment: quit 3 yrs ago   Substance and Sexual Activity    Alcohol use: Never    Drug use: Not Currently      Comment: 1995    Sexual activity: Yes     Partners: Male     Social Determinants of Health     Physical Activity: Inactive    Days of Exercise per Week: 0 days    Minutes of Exercise per Session: 0 min       Review of Systems   Musculoskeletal: Positive for arthralgias, back pain and joint swelling.   All other systems reviewed and are negative.      OBJECTIVE:     Vital Signs  Temp: 98.9 °F (37.2 °C)  Pulse: 87  BP: 134/83  Pain Score:   9  There is no height or weight on file to calculate BMI.    Physical Exam:  Nursing note and vitals reviewed.    General: well developed, well nourished, no distress.   Pulmonary: no signs of respiratory distress, comfortable appearing on room air  Skin: Skin is warm, dry and intact.    Neuro:  Mental Status: Alert and oriented. Oriented x 4  Language/language: No aphasia. No dysarthria.   Cranial nerves: PERRL, EOMI, face symmetric   Sensory: intact to light touch throughout  Motor Strength: Full strength lower extremities.   Reflexes: Carballo negative & clonus negative bilateral.  Cerebellar: Finger-to-nose: intact bilaterally   Gait stable.  Able to walk on heels & toes    Diagnostic Results:  XR L spine w/ flex/ext 8/22: alignment maintained  MRI dated 1/19/22 was reviewed- spondylotic changes with mild central canal narrowing and bilateral foraminal narrowing at L4-5 and left foraminal stenosis at L5-S1.     ASSESSMENT/PLAN:     53 yo female with lumbar spondylosis and acute on chronic lower back pain now with left leg radicular symptoms but also with isolated left knee and hip pain in a non-dermatomal distribution. She could benefit from surgical decompression but at this point her back and focal joint pains bother her the greatest.  I discussed several options including repeating physical therapy, trial of pain management with injections and surgical intervention.  She did have some benefit previously with physical therapy and would like to try this again, new referral  sent. She may also benefit from orthopedic evaluation for her knee pain.  If her back and radicular symptoms fail to improve or become more prominent, she can follow up as needed to re-consider surgical options.        Note dictated with voice recognition software, please excuse any grammatical errors.

## 2022-08-25 ENCOUNTER — LAB VISIT (OUTPATIENT)
Dept: LAB | Facility: HOSPITAL | Age: 52
End: 2022-08-25
Attending: INTERNAL MEDICINE
Payer: MEDICAID

## 2022-08-25 DIAGNOSIS — K22.10 ULCER OF ESOPHAGUS WITHOUT BLEEDING: ICD-10-CM

## 2022-08-25 DIAGNOSIS — B96.81 HELICOBACTER PYLORI (H. PYLORI) AS THE CAUSE OF DISEASES CLASSIFIED ELSEWHERE: Primary | ICD-10-CM

## 2022-08-25 DIAGNOSIS — K21.00 GASTRO-ESOPHAGEAL REFLUX DISEASE WITH ESOPHAGITIS, WITHOUT BLEEDING: ICD-10-CM

## 2022-08-25 DIAGNOSIS — K63.5 POLYP OF COLON, UNSPECIFIED PART OF COLON, UNSPECIFIED TYPE: ICD-10-CM

## 2022-08-25 LAB — H. PYLORI STOOL: NEGATIVE

## 2022-08-25 PROCEDURE — 87338 HPYLORI STOOL AG IA: CPT

## 2022-08-30 ENCOUNTER — HOSPITAL ENCOUNTER (EMERGENCY)
Facility: HOSPITAL | Age: 52
Discharge: HOME | End: 2022-08-30
Attending: FAMILY MEDICINE
Payer: MEDICAID

## 2022-08-30 VITALS
RESPIRATION RATE: 18 BRPM | DIASTOLIC BLOOD PRESSURE: 82 MMHG | TEMPERATURE: 97 F | HEART RATE: 79 BPM | WEIGHT: 172 LBS | BODY MASS INDEX: 31.65 KG/M2 | OXYGEN SATURATION: 98 % | SYSTOLIC BLOOD PRESSURE: 145 MMHG | HEIGHT: 62 IN

## 2022-08-30 DIAGNOSIS — M25.532 ACUTE PAIN OF LEFT WRIST: Primary | ICD-10-CM

## 2022-08-30 DIAGNOSIS — M25.539 WRIST PAIN: ICD-10-CM

## 2022-08-30 PROCEDURE — 99283 EMERGENCY DEPT VISIT LOW MDM: CPT | Mod: 25

## 2022-08-30 RX ORDER — HYDROCODONE BITARTRATE AND ACETAMINOPHEN 5; 325 MG/1; MG/1
1 TABLET ORAL EVERY 6 HOURS PRN
Qty: 12 TABLET | Refills: 0 | Status: SHIPPED | OUTPATIENT
Start: 2022-08-30 | End: 2022-09-02

## 2022-08-31 ENCOUNTER — TELEPHONE (OUTPATIENT)
Dept: NEUROSURGERY | Facility: CLINIC | Age: 52
End: 2022-08-31
Payer: MEDICAID

## 2022-08-31 NOTE — TELEPHONE ENCOUNTER
Spoke with pt and informed that I had faxed her referral to this number last week, but that I would fax the referral again today to ensure that the PT office would receive the referral. I apologized for the delay in care and told her to contact back if they still did not receive the referral. Pt verbalized understanding    ----- Message from Manda Schmitz sent at 8/31/2022  3:08 PM CDT -----  Regarding: call  Contact: [phone]  Pt would like someone in the staff to return a call for a fax that is supposed to have been sent. Fax to [facility] in [place], fax #[phone]. Pt is upset in the delay of care with starting PT

## 2022-08-31 NOTE — ED PROVIDER NOTES
Encounter Date: 2022       History     Chief Complaint   Patient presents with    Wrist Pain     Non traumatic left wrist pain that has increased over the last several days     52 y.o. female presents to the ED with left wrist pain onset 3 days ago with worsening today. Denies injury or trauma. States she has taken OTC medication without relief. States she is right handed and does not do repetitive motions.     The history is provided by the patient. No  was used.   Wrist Pain  This is a new problem. The current episode started more than 2 days ago. The problem occurs constantly. The problem has been gradually worsening. Pertinent negatives include no chest pain and no shortness of breath. The symptoms are aggravated by bending. The symptoms are relieved by rest. She has tried acetaminophen for the symptoms. The treatment provided no relief.   Review of patient's allergies indicates:  No Known Allergies    Past Medical History:   Diagnosis Date    GERD (gastroesophageal reflux disease)     Hypertension     Personal history of colonic polyps 2022     Past Surgical History:   Procedure Laterality Date     SECTION      4 total    COLONOSCOPY  2022    TUBAL LIGATION       Family History   Problem Relation Age of Onset    Hypertension Mother     Diabetes Mother     Hypertension Father     Heart disease Sister     Hypertension Sister     Stroke Sister      Social History     Tobacco Use    Smoking status: Former     Types: Cigarettes    Smokeless tobacco: Former     Quit date: 2019    Tobacco comments:     quit 3 yrs ago   Substance Use Topics    Alcohol use: Never    Drug use: Not Currently     Comment:      Review of Systems   Constitutional:  Negative for fever.   HENT:  Negative for sore throat.    Respiratory:  Negative for shortness of breath.    Cardiovascular:  Negative for chest pain.   Gastrointestinal:  Negative for nausea.   Genitourinary:  Negative for  dysuria.   Musculoskeletal:  Positive for arthralgias. Negative for back pain.   Skin:  Negative for rash.   Neurological:  Negative for weakness.   Hematological:  Does not bruise/bleed easily.   All other systems reviewed and are negative.    Physical Exam     Initial Vitals [08/30/22 1803]   BP Pulse Resp Temp SpO2   (!) 145/82 79 18 96.8 °F (36 °C) 98 %      MAP       --         Physical Exam    Nursing note and vitals reviewed.  Constitutional: She appears well-developed and well-nourished.   HENT:   Head: Normocephalic and atraumatic.   Eyes: EOM are normal. Pupils are equal, round, and reactive to light.   Neck: Neck supple.   Cardiovascular:  Normal rate, regular rhythm, normal heart sounds and intact distal pulses.           Pulmonary/Chest: Breath sounds normal.   Musculoskeletal:         General: Normal range of motion.      Right wrist: Normal.      Left wrist: Tenderness present. No swelling or deformity. Normal pulse.      Cervical back: Neck supple.     Neurological: She is alert and oriented to person, place, and time. She has normal strength. GCS score is 15. GCS eye subscore is 4. GCS verbal subscore is 5. GCS motor subscore is 6.   Skin: Skin is warm and dry. Capillary refill takes less than 2 seconds.   Psychiatric: She has a normal mood and affect.       ED Course   Procedures  Labs Reviewed - No data to display       Imaging Results              X-Ray Wrist Complete Left (Final result)  Result time 08/30/22 18:55:36      Final result by Todd Hurtado MD (08/30/22 18:55:36)                   Impression:      No acute findings.      Electronically signed by: Todd Hurtado  Date:    08/30/2022  Time:    18:55               Narrative:    EXAMINATION:  XR WRIST COMPLETE 3 VIEWS LEFT    CLINICAL HISTORY:  Pain in unspecified wrist    COMPARISON:  None    FINDINGS:  Three views left wrist demonstrate no fracture or dislocation.  There are mild degenerative changes.                                        Medications - No data to display  Medical Decision Making:   Differential Diagnosis:   Arthritis, carpel tunnel, gout  Clinical Tests:   Radiological Study: Reviewed                    Clinical Impression:   Final diagnoses:  [M25.539] Wrist pain  [M25.532] Acute pain of left wrist (Primary)        ED Disposition Condition    Discharge Stable          ED Prescriptions       Medication Sig Dispense Start Date End Date Auth. Provider    HYDROcodone-acetaminophen (NORCO) 5-325 mg per tablet Take 1 tablet by mouth every 6 (six) hours as needed for Pain. 12 tablet 8/30/2022 9/2/2022 Emiliano Chester PA-C          Follow-up Information       Follow up With Specialties Details Why Contact Info    Shira Funk NP Nurse Practitioner   [address] [facility] [address]  [phone]      [facility] - Emergency Dept Emergency Medicine In 1 week If symptoms worsen [address]  [facility] [id number] [phone]             Emiliano Chester PA-C  08/31/22 7103

## 2022-10-21 ENCOUNTER — HOSPITAL ENCOUNTER (EMERGENCY)
Facility: HOSPITAL | Age: 52
Discharge: HOME | End: 2022-10-21
Attending: STUDENT IN AN ORGANIZED HEALTH CARE EDUCATION/TRAINING PROGRAM
Payer: MEDICAID

## 2022-10-21 VITALS
RESPIRATION RATE: 16 BRPM | BODY MASS INDEX: 33.26 KG/M2 | OXYGEN SATURATION: 99 % | SYSTOLIC BLOOD PRESSURE: 143 MMHG | TEMPERATURE: 98 F | DIASTOLIC BLOOD PRESSURE: 81 MMHG | WEIGHT: 180.75 LBS | HEIGHT: 62 IN | HEART RATE: 78 BPM

## 2022-10-21 DIAGNOSIS — M54.50 CHRONIC LOW BACK PAIN WITHOUT SCIATICA, UNSPECIFIED BACK PAIN LATERALITY: Primary | ICD-10-CM

## 2022-10-21 DIAGNOSIS — G89.29 CHRONIC LOW BACK PAIN WITHOUT SCIATICA, UNSPECIFIED BACK PAIN LATERALITY: Primary | ICD-10-CM

## 2022-10-21 PROCEDURE — 99284 EMERGENCY DEPT VISIT MOD MDM: CPT

## 2022-10-21 RX ORDER — KETOROLAC TROMETHAMINE 10 MG/1
10 TABLET, FILM COATED ORAL
Status: DISCONTINUED | OUTPATIENT
Start: 2022-10-21 | End: 2022-10-21 | Stop reason: HOSPADM

## 2022-10-21 RX ORDER — INDOMETHACIN 25 MG/1
25 CAPSULE ORAL 2 TIMES DAILY PRN
Qty: 14 CAPSULE | Refills: 0 | Status: SHIPPED | OUTPATIENT
Start: 2022-10-21 | End: 2022-10-28

## 2022-10-21 RX ORDER — BACLOFEN 10 MG/1
10 TABLET ORAL 3 TIMES DAILY PRN
Qty: 21 TABLET | Refills: 0 | Status: SHIPPED | OUTPATIENT
Start: 2022-10-21 | End: 2023-06-12

## 2022-10-21 NOTE — ED PROVIDER NOTES
Encounter Date: 10/21/2022       History     Chief Complaint   Patient presents with    Back Pain     Chronic back and hip pain; states that her prescription medication does not work. No acute change in quality.     Pt is a 52 y.o. female who presents to the [facility] ED complaining of a flare of arthritis pain. Reports pain to lower back and bilateral knees. Pt currently on medication for issue but is not experiencing pain relief. Denies chest pain, SOB, weakness, dizziness, fever, abdominal pain, or loss of bowel or bladder control.     Review of patient's allergies indicates:  No Known Allergies  Past Medical History:   Diagnosis Date    GERD (gastroesophageal reflux disease)     Hypertension     Personal history of colonic polyps 2022     Past Surgical History:   Procedure Laterality Date     SECTION      4 total    COLONOSCOPY  2022    TUBAL LIGATION       Family History   Problem Relation Age of Onset    Hypertension Mother     Diabetes Mother     Hypertension Father     Heart disease Sister     Hypertension Sister     Stroke Sister      Social History     Tobacco Use    Smoking status: Former     Types: Cigarettes    Smokeless tobacco: Never    Tobacco comments:     quit 3 yrs ago   Substance Use Topics    Alcohol use: Never    Drug use: Not Currently     Comment:      Review of Systems   Constitutional:  Negative for chills, diaphoresis, fatigue and fever.   HENT:  Negative for facial swelling, rhinorrhea, sinus pressure, sinus pain, sore throat and trouble swallowing.    Respiratory:  Negative for cough, chest tightness, shortness of breath and wheezing.    Cardiovascular:  Negative for chest pain, palpitations and leg swelling.   Gastrointestinal:  Negative for abdominal pain, diarrhea, nausea and vomiting.   Genitourinary:  Negative for dysuria, flank pain, frequency, hematuria and urgency.   Musculoskeletal:  Positive for back pain and myalgias. Negative for arthralgias and joint  swelling.   Skin:  Negative for color change and rash.   Neurological:  Negative for dizziness, syncope, weakness and light-headedness.   Hematological:  Does not bruise/bleed easily.   All other systems reviewed and are negative.    Physical Exam     Initial Vitals [10/21/22 0910]   BP Pulse Resp Temp SpO2   (!) 143/81 78 16 97.8 °F (36.6 °C) 99 %      MAP       --         Physical Exam    Nursing note and vitals reviewed.  Constitutional: She appears well-developed and well-nourished.   HENT:   Head: Normocephalic and atraumatic.   Nose: Nose normal.   Mouth/Throat: Oropharynx is clear and moist.   Eyes: Conjunctivae and EOM are normal. Pupils are equal, round, and reactive to light.   Neck: Neck supple.   Normal range of motion.  Cardiovascular:  Normal rate, regular rhythm, normal heart sounds and intact distal pulses.           Pulmonary/Chest: Effort normal and breath sounds normal. No respiratory distress. She has no wheezes. She has no rhonchi. She has no rales. She exhibits no tenderness.   Abdominal: Abdomen is soft and flat. Bowel sounds are normal. She exhibits no distension. There is no abdominal tenderness. There is no rebound, no guarding, no tenderness at McBurney's point and negative Martinez's sign. negative psoas sign  Musculoskeletal:         General: Normal range of motion.      Cervical back: Normal range of motion and neck supple.      Lumbar back: Spasms and tenderness present. No swelling, edema or bony tenderness. Normal range of motion.        Back:      Neurological: She is alert and oriented to person, place, and time. She has normal strength and normal reflexes.   Skin: Skin is warm and dry. Capillary refill takes less than 2 seconds.   Psychiatric: She has a normal mood and affect. Her speech is normal and behavior is normal. Judgment and thought content normal.       ED Course   Procedures  Labs Reviewed - No data to display       Imaging Results    None          Medications - No data  to display    Medical Decision Making:   Differential Diagnosis:   Muscle strain  Arthritis  Chronic back pain  ED Management:  9:36 AM Reassessed patient at this time. Reports condition has improved. Discussed with patient all pertinent ED information and results. Discussed diagnosis and treatment plan with patient. Follow up instructions and return to ED instruction have been given. All questions and concerns were addressed at this time. Patient voices understanding of information and instructions. Patient is comfortable with plan and discharge. Patient is stable for discharge.        APC / Resident Notes:   I was not physically present during the history, exam or disposition of this patient. I was available at all times for consultation. (Chio)                   Clinical Impression:   Final diagnoses:  [M54.50, G89.29] Chronic low back pain without sciatica, unspecified back pain laterality (Primary)      ED Disposition Condition    Discharge Stable          ED Prescriptions       Medication Sig Dispense Start Date End Date Auth. Provider    indomethacin (INDOCIN) 25 MG capsule Take 1 capsule (25 mg total) by mouth 2 (two) times daily as needed (pain). Hold meloxicam while on this medication 14 capsule 10/21/2022 10/28/2022 MANI Sim Jr.    baclofen (LIORESAL) 10 MG tablet Take 1 tablet (10 mg total) by mouth 3 (three) times daily as needed (muscle spasms). 21 tablet 10/21/2022 10/28/2022 MANI Sim Jr.          Follow-up Information       Follow up With Specialties Details Why Contact Info    Shira Funk NP Nurse Practitioner In 1 week  [address] [facility] [address]  [phone]      [facility] - Emergency Dept Emergency Medicine In 3 days As needed, If symptoms worsen [address] [facility] [id number] [phone]             Todd Chávez Jr., MANI  10/21/22 0938       Todd Chávez Jr., LENAP  10/21/22 0939       Jhonathan Barroso MD  10/21/22  1933

## 2022-12-12 ENCOUNTER — OFFICE VISIT (OUTPATIENT)
Dept: INTERNAL MEDICINE | Facility: CLINIC | Age: 52
End: 2022-12-12
Payer: MEDICAID

## 2022-12-12 ENCOUNTER — TELEPHONE (OUTPATIENT)
Dept: INTERNAL MEDICINE | Facility: CLINIC | Age: 52
End: 2022-12-12

## 2022-12-12 ENCOUNTER — LAB VISIT (OUTPATIENT)
Dept: LAB | Facility: HOSPITAL | Age: 52
End: 2022-12-12
Attending: NURSE PRACTITIONER
Payer: MEDICAID

## 2022-12-12 VITALS
RESPIRATION RATE: 16 BRPM | HEART RATE: 64 BPM | TEMPERATURE: 98 F | WEIGHT: 180 LBS | BODY MASS INDEX: 33.13 KG/M2 | DIASTOLIC BLOOD PRESSURE: 76 MMHG | HEIGHT: 62 IN | SYSTOLIC BLOOD PRESSURE: 111 MMHG

## 2022-12-12 DIAGNOSIS — F41.1 GENERALIZED ANXIETY DISORDER: ICD-10-CM

## 2022-12-12 DIAGNOSIS — R73.03 PREDIABETES: ICD-10-CM

## 2022-12-12 DIAGNOSIS — M47.816 LUMBAR SPONDYLOSIS: ICD-10-CM

## 2022-12-12 DIAGNOSIS — E55.9 VITAMIN D DEFICIENCY: ICD-10-CM

## 2022-12-12 DIAGNOSIS — I10 HYPERTENSION, UNSPECIFIED TYPE: ICD-10-CM

## 2022-12-12 DIAGNOSIS — Z11.9 SCREENING EXAMINATION FOR INFECTIOUS DISEASE: ICD-10-CM

## 2022-12-12 DIAGNOSIS — Z00.00 WELLNESS EXAMINATION: Primary | ICD-10-CM

## 2022-12-12 DIAGNOSIS — Z00.00 WELLNESS EXAMINATION: ICD-10-CM

## 2022-12-12 DIAGNOSIS — E66.9 OBESITY, UNSPECIFIED CLASSIFICATION, UNSPECIFIED OBESITY TYPE, UNSPECIFIED WHETHER SERIOUS COMORBIDITY PRESENT: ICD-10-CM

## 2022-12-12 DIAGNOSIS — Z12.31 VISIT FOR SCREENING MAMMOGRAM: ICD-10-CM

## 2022-12-12 DIAGNOSIS — R73.02 IGT (IMPAIRED GLUCOSE TOLERANCE): ICD-10-CM

## 2022-12-12 PROBLEM — B35.0 TINEA CAPITIS: Status: RESOLVED | Noted: 2022-08-10 | Resolved: 2022-12-12

## 2022-12-12 PROBLEM — M54.50 LOW BACK PAIN: Status: RESOLVED | Noted: 2022-05-10 | Resolved: 2022-12-12

## 2022-12-12 LAB
ALBUMIN SERPL-MCNC: 3.9 GM/DL (ref 3.5–5)
ALBUMIN/GLOB SERPL: 1 RATIO (ref 1.1–2)
ALP SERPL-CCNC: 79 UNIT/L (ref 40–150)
ALT SERPL-CCNC: 14 UNIT/L (ref 0–55)
AST SERPL-CCNC: 19 UNIT/L (ref 5–34)
BASOPHILS # BLD AUTO: 0.02 X10(3)/MCL (ref 0–0.2)
BASOPHILS NFR BLD AUTO: 0.5 %
BILIRUBIN DIRECT+TOT PNL SERPL-MCNC: 0.4 MG/DL
BUN SERPL-MCNC: 14.4 MG/DL (ref 9.8–20.1)
CALCIUM SERPL-MCNC: 9.5 MG/DL (ref 8.4–10.2)
CHLORIDE SERPL-SCNC: 105 MMOL/L (ref 98–107)
CHOLEST SERPL-MCNC: 177 MG/DL
CHOLEST/HDLC SERPL: 4 {RATIO} (ref 0–5)
CO2 SERPL-SCNC: 26 MMOL/L (ref 22–29)
CREAT SERPL-MCNC: 0.76 MG/DL (ref 0.55–1.02)
DEPRECATED CALCIDIOL+CALCIFEROL SERPL-MC: 23.6 NG/ML (ref 30–80)
EOSINOPHIL # BLD AUTO: 0.13 X10(3)/MCL (ref 0–0.9)
EOSINOPHIL NFR BLD AUTO: 3 %
ERYTHROCYTE [DISTWIDTH] IN BLOOD BY AUTOMATED COUNT: 14.6 % (ref 11.5–17)
EST. AVERAGE GLUCOSE BLD GHB EST-MCNC: 139.9 MG/DL
GFR SERPLBLD CREATININE-BSD FMLA CKD-EPI: >60 MLS/MIN/1.73/M2
GLOBULIN SER-MCNC: 4 GM/DL (ref 2.4–3.5)
GLUCOSE SERPL-MCNC: 115 MG/DL (ref 74–100)
HAV IGM SERPL QL IA: NONREACTIVE
HBA1C MFR BLD: 6.5 %
HBV CORE IGM SERPL QL IA: NONREACTIVE
HBV SURFACE AG SERPL QL IA: NONREACTIVE
HCT VFR BLD AUTO: 37.2 % (ref 37–47)
HCV AB SERPL QL IA: NONREACTIVE
HDLC SERPL-MCNC: 44 MG/DL (ref 35–60)
HGB BLD-MCNC: 11.9 GM/DL (ref 12–16)
HIV 1+2 AB+HIV1 P24 AG SERPL QL IA: NONREACTIVE
IMM GRANULOCYTES # BLD AUTO: 0.01 X10(3)/MCL (ref 0–0.04)
IMM GRANULOCYTES NFR BLD AUTO: 0.2 %
LDLC SERPL CALC-MCNC: 120 MG/DL (ref 50–140)
LYMPHOCYTES # BLD AUTO: 1.84 X10(3)/MCL (ref 0.6–4.6)
LYMPHOCYTES NFR BLD AUTO: 42.2 %
MCH RBC QN AUTO: 25.5 PG (ref 27–31)
MCHC RBC AUTO-ENTMCNC: 32 MG/DL (ref 33–36)
MCV RBC AUTO: 79.8 FL (ref 80–94)
MONOCYTES # BLD AUTO: 0.44 X10(3)/MCL (ref 0.1–1.3)
MONOCYTES NFR BLD AUTO: 10.1 %
NEUTROPHILS # BLD AUTO: 1.9 X10(3)/MCL (ref 2.1–9.2)
NEUTROPHILS NFR BLD AUTO: 44 %
NRBC BLD AUTO-RTO: 0 %
PLATELET # BLD AUTO: 386 X10(3)/MCL (ref 130–400)
PMV BLD AUTO: 9.2 FL (ref 7.4–10.4)
POTASSIUM SERPL-SCNC: 4.1 MMOL/L (ref 3.5–5.1)
PROT SERPL-MCNC: 7.9 GM/DL (ref 6.4–8.3)
RBC # BLD AUTO: 4.66 X10(6)/MCL (ref 4.2–5.4)
SODIUM SERPL-SCNC: 140 MMOL/L (ref 136–145)
TRIGL SERPL-MCNC: 66 MG/DL (ref 37–140)
TSH SERPL-ACNC: 1.76 UIU/ML (ref 0.35–4.94)
VLDLC SERPL CALC-MCNC: 13 MG/DL
WBC # SPEC AUTO: 4.4 X10(3)/MCL (ref 4.5–11.5)

## 2022-12-12 PROCEDURE — 83036 HEMOGLOBIN GLYCOSYLATED A1C: CPT

## 2022-12-12 PROCEDURE — 3074F PR MOST RECENT SYSTOLIC BLOOD PRESSURE < 130 MM HG: ICD-10-PCS | Mod: CPTII,,, | Performed by: NURSE PRACTITIONER

## 2022-12-12 PROCEDURE — 99212 PR OFFICE/OUTPT VISIT, EST, LEVL II, 10-19 MIN: ICD-10-PCS | Mod: 25,S$PBB,, | Performed by: NURSE PRACTITIONER

## 2022-12-12 PROCEDURE — 99396 PREV VISIT EST AGE 40-64: CPT | Mod: S$PBB,,, | Performed by: NURSE PRACTITIONER

## 2022-12-12 PROCEDURE — 99396 PR PREVENTIVE VISIT,EST,40-64: ICD-10-PCS | Mod: S$PBB,,, | Performed by: NURSE PRACTITIONER

## 2022-12-12 PROCEDURE — 80074 ACUTE HEPATITIS PANEL: CPT

## 2022-12-12 PROCEDURE — 36415 COLL VENOUS BLD VENIPUNCTURE: CPT

## 2022-12-12 PROCEDURE — 1160F PR REVIEW ALL MEDS BY PRESCRIBER/CLIN PHARMACIST DOCUMENTED: ICD-10-PCS | Mod: CPTII,,, | Performed by: NURSE PRACTITIONER

## 2022-12-12 PROCEDURE — 99212 OFFICE O/P EST SF 10 MIN: CPT | Mod: 25,S$PBB,, | Performed by: NURSE PRACTITIONER

## 2022-12-12 PROCEDURE — 82306 VITAMIN D 25 HYDROXY: CPT

## 2022-12-12 PROCEDURE — 3074F SYST BP LT 130 MM HG: CPT | Mod: CPTII,,, | Performed by: NURSE PRACTITIONER

## 2022-12-12 PROCEDURE — 85025 COMPLETE CBC W/AUTO DIFF WBC: CPT

## 2022-12-12 PROCEDURE — 3078F DIAST BP <80 MM HG: CPT | Mod: CPTII,,, | Performed by: NURSE PRACTITIONER

## 2022-12-12 PROCEDURE — 3008F BODY MASS INDEX DOCD: CPT | Mod: CPTII,,, | Performed by: NURSE PRACTITIONER

## 2022-12-12 PROCEDURE — 87389 HIV-1 AG W/HIV-1&-2 AB AG IA: CPT

## 2022-12-12 PROCEDURE — 80061 LIPID PANEL: CPT

## 2022-12-12 PROCEDURE — 80053 COMPREHEN METABOLIC PANEL: CPT

## 2022-12-12 PROCEDURE — 99215 OFFICE O/P EST HI 40 MIN: CPT | Mod: PBBFAC | Performed by: NURSE PRACTITIONER

## 2022-12-12 PROCEDURE — 3008F PR BODY MASS INDEX (BMI) DOCUMENTED: ICD-10-PCS | Mod: CPTII,,, | Performed by: NURSE PRACTITIONER

## 2022-12-12 PROCEDURE — 84443 ASSAY THYROID STIM HORMONE: CPT

## 2022-12-12 PROCEDURE — 1159F MED LIST DOCD IN RCRD: CPT | Mod: CPTII,,, | Performed by: NURSE PRACTITIONER

## 2022-12-12 PROCEDURE — 1160F RVW MEDS BY RX/DR IN RCRD: CPT | Mod: CPTII,,, | Performed by: NURSE PRACTITIONER

## 2022-12-12 PROCEDURE — 3078F PR MOST RECENT DIASTOLIC BLOOD PRESSURE < 80 MM HG: ICD-10-PCS | Mod: CPTII,,, | Performed by: NURSE PRACTITIONER

## 2022-12-12 PROCEDURE — 1159F PR MEDICATION LIST DOCUMENTED IN MEDICAL RECORD: ICD-10-PCS | Mod: CPTII,,, | Performed by: NURSE PRACTITIONER

## 2022-12-12 RX ORDER — IBUPROFEN 800 MG/1
800 TABLET ORAL
COMMUNITY
Start: 2022-04-25 | End: 2023-06-12 | Stop reason: SDUPTHER

## 2022-12-12 RX ORDER — FAMOTIDINE 40 MG/1
40 TABLET, FILM COATED ORAL
COMMUNITY
Start: 2022-11-09

## 2022-12-12 RX ORDER — AMLODIPINE BESYLATE 5 MG/1
5 TABLET ORAL DAILY
Qty: 90 TABLET | Refills: 2 | Status: SHIPPED | OUTPATIENT
Start: 2022-12-12 | End: 2023-06-12 | Stop reason: SDUPTHER

## 2022-12-12 RX ORDER — HYDROXYZINE PAMOATE 25 MG/1
25 CAPSULE ORAL 3 TIMES DAILY PRN
Qty: 90 CAPSULE | Refills: 6 | Status: SHIPPED | OUTPATIENT
Start: 2022-12-12 | End: 2023-06-12 | Stop reason: SDUPTHER

## 2022-12-12 NOTE — PROGRESS NOTES
Shira L Good, NP   [facility]  [address] [facility] [address]      PATIENT NAME: Tova Stack  : 1970  DATE: 22  MRN: [id number]      Billing Provider: Shira Funk NP  Level of Service: HI PREVENTIVE VISIT,EST,40-64  Patient PCP Information       Provider PCP Type    Shira Funk NP General            Reason for Visit / Chief Complaint: Medication Refill and Well Adult       History of Present Illness / Problem Focused Workflow     Tova Stack presents to the clinic with Medication Refill and Well Adult     53 yo AAF for wellness. PMH includes HTN, IGT, anemia, lumbago, OA hip, anxiety, h/o tobacco abuse. Labs not completed but fasting and will do them after visit. Refuses vaccines. Overall feels well except continues with chronic lumbago. She is followed by Dr. Cid in [place] and just completed PT again. Will call Dr. Cid's office for f/u as she denies having visit scheduled yet. Interested in PAP and MMG. Needs medication refills. Otherwise, denies any other concerns/ complaints, fever, chills, HA, dizziness, sore throat, LAD, cough, SOB, CP, abdominal pain, n/v/d, poor appetite, bloody stools.     Other providers   Dr. Cid- Neurosurgery at [place]  Dr. Fischer- GI  [facility] GYN      Review of Systems     Review of Systems   Constitutional: Negative.    HENT: Negative.     Eyes: Negative.    Respiratory: Negative.     Cardiovascular: Negative.    Gastrointestinal: Negative.    Endocrine: Negative.    Genitourinary: Negative.    Musculoskeletal:  Positive for back pain.   Skin: Negative.    Allergic/Immunologic: Negative.    Neurological: Negative.    Hematological: Negative.    Psychiatric/Behavioral: Negative.       Medical / Social / Family History     Past Medical History:   Diagnosis Date    GERD (gastroesophageal reflux disease)     Hypertension     Personal history of colonic polyps 2022        Past Surgical History:   Procedure Laterality Date     SECTION      4 total    COLONOSCOPY  2022    TUBAL LIGATION         Social History  Ms. Bernardo  reports that she quit smoking about 3 years ago. Her smoking use included cigarettes. She has a 5.00 pack-year smoking history. She has never used smokeless tobacco. She reports that she does not currently use drugs after having used the following drugs: Cocaine. She reports that she does not drink alcohol.    Family History  Ms. Bernardo's family history includes Cancer in her maternal grandmother; Colon cancer in her maternal uncle; Diabetes in her mother; Heart disease in her sister; Hypertension in her father, mother, and sister; Stomach cancer in her maternal aunt; Stroke in her sister.    Medications and Allergies     Medications  Medication List with Changes/Refills   Current Medications    BACLOFEN (LIORESAL) 10 MG TABLET    Take 1 tablet (10 mg total) by mouth 3 (three) times daily as needed (muscle spasms).    CHOLECALCIFEROL, VITAMIN D3, (VITAMIN D3) 50 MCG (2,000 UNIT) CAP CAPSULE    Take 1 capsule (2,000 Units total) by mouth once daily at 6am.    FAMOTIDINE (PEPCID) 40 MG TABLET    Take 40 mg by mouth.    FERROUS SULFATE 325 (65 FE) MG EC TABLET    Take 1 tablet (325 mg total) by mouth every Mon, Wed, Fri.    IBUPROFEN (ADVIL,MOTRIN) 800 MG TABLET    Take 800 mg by mouth.    MELOXICAM (MOBIC) 15 MG TABLET    Take 1 tablet (15 mg total) by mouth daily as needed for Pain. Take with food, avoid other NSAIDs    PANTOPRAZOLE (PROTONIX) 40 MG TABLET    Take 40 mg by mouth once daily.    TRAMADOL (ULTRAM) 50 MG TABLET    Take 50 mg by mouth every 8 (eight) hours as needed.   Changed and/or Refilled Medications    Modified Medication Previous Medication    AMLODIPINE (NORVASC) 5 MG TABLET amLODIPine (NORVASC) 5 MG tablet       Take 1 tablet (5 mg total) by mouth once daily.    Take 1 tablet (5 mg total) by mouth once daily.    HYDROXYZINE  PAMOATE (VISTARIL) 25 MG CAP hydrOXYzine pamoate (VISTARIL) 25 MG Cap       Take 1 capsule (25 mg total) by mouth 3 (three) times daily as needed (anxiety).    Take 1 capsule (25 mg total) by mouth 3 (three) times daily as needed (anxiety).       Allergies  Review of patient's allergies indicates:  No Known Allergies    Physical Examination     Vitals:    12/12/22 0738   BP: 111/76   Pulse: 64   Resp: 16   Temp: 98.1 °F (36.7 °C)     Physical Exam  Vitals and nursing note reviewed.   Constitutional:       Appearance: Normal appearance. She is not ill-appearing.   HENT:      Head: Normocephalic.      Right Ear: Tympanic membrane normal.      Left Ear: Tympanic membrane normal.      Nose: Nose normal.      Mouth/Throat:      Mouth: Mucous membranes are moist.   Eyes:      Extraocular Movements: Extraocular movements intact.      Conjunctiva/sclera: Conjunctivae normal.      Pupils: Pupils are equal, round, and reactive to light.   Neck:      Thyroid: No thyroid mass, thyromegaly or thyroid tenderness.      Vascular: No carotid bruit.   Cardiovascular:      Rate and Rhythm: Normal rate and regular rhythm.      Pulses: Normal pulses.   Pulmonary:      Effort: Pulmonary effort is normal. No respiratory distress.      Breath sounds: Normal breath sounds.   Abdominal:      General: Bowel sounds are normal. There is no distension.      Palpations: Abdomen is soft. There is no mass.      Tenderness: There is no abdominal tenderness.      Hernia: No hernia is present.   Musculoskeletal:         General: Normal range of motion.      Cervical back: Normal range of motion and neck supple. No tenderness.      Lumbar back: Tenderness present.      Right lower leg: No edema.      Left lower leg: No edema.   Lymphadenopathy:      Cervical: No cervical adenopathy.   Skin:     General: Skin is warm and dry.      Capillary Refill: Capillary refill takes less than 2 seconds.   Neurological:      Mental Status: She is alert and oriented  to person, place, and time. Mental status is at baseline.      Motor: No weakness.   Psychiatric:         Mood and Affect: Mood normal.         Behavior: Behavior normal.         Thought Content: Thought content normal.         Judgment: Judgment normal.         Results       Assessment and Plan (including Health Maintenance)     Plan:         Health Maintenance Due   Topic Date Due    Hemoglobin A1c (Prediabetes)  04/12/2019    COVID-19 Vaccine (4 - Booster for Moderna series) 02/01/2022    Mammogram  01/28/2023       Problem List Items Addressed This Visit          Neuro    Lumbar spondylosis    Overview     See LDD.         Current Assessment & Plan     Did not receive appointment with pain management. Referral ordered again to Dr. Barton and encouraged f/u with Dr. Cid after completing PT           Relevant Orders    Ambulatory referral/consult to Pain Clinic       Psychiatric    Generalized anxiety disorder    Current Assessment & Plan     Stable. Continue with medications as prescribed (taking once daily)         Relevant Medications    hydrOXYzine pamoate (VISTARIL) 25 MG Cap       Cardiac/Vascular    Hypertension    Current Assessment & Plan     /76  Follow low sodium diet, < 2 gm/day (avoid high salty foods such as processed meats/ sausage/hsieh/ sandwich meat, chips, pickles, cheese, crackers and soft drinks/ electrolyte replacement drinks).  Avoid tobacco/ alcohol use  Educated on health benefits of at least 5 days/ week of 30 minutes moderate intensity exercise (brisk walking) and 2 or more days/ week of muscle strength activities  Daily ASA 81 mg for CV prevention  Continue current medication regimen           Relevant Medications    amLODIPine (NORVASC) 5 MG tablet       Endocrine    Obesity    Current Assessment & Plan     BMI 32.92  Educated on increased risk of disease s/t obesity.  Educated on health benefits of at least 5 days/ week of 30 minutes moderate intensity exercise (brisk  walking) and 2 or more days/ week of muscle strength activities (as tolerated).  Eat well balanced diet of fresh fruits/ vegetables, whole grains, lean meats and limit high carbohydrate foods.            Prediabetes    Current Assessment & Plan     Labs due           Vitamin D deficiency    Current Assessment & Plan     Vit D- labs ordered   Educated on increasing foods high in Vitamin D such as mushrooms, fish oil, cod liver, salmon, tuna, egg yolks, milk fortified with Vitamin D and foods fortified with Vitamin D such as cereals and oatmeal.  Daily supplementation of Vitamin D 2000 IU daily in addition to Calcium supplementation 1000 mg- 1200 mg daily.            Other Visit Diagnoses       Wellness examination    -  Primary  Avoid tobacco/alcohol/ drugs  Regular exercise as tolerated  Healthy lifestyle habits  MMG due 1/28/23  GYN appt scheduled 4/23/23  Colonoscopy 6/15/22 with Dr. Fischer- one polyp removed- hyperplastic  Refused vaccines; COVID 19 vaccinated     Visit for screening mammogram        Relevant Orders    Mammo Digital Screening Bilat w/ Duc            Health Maintenance Topics with due status: Not Due       Topic Last Completion Date    Lipid Panel 04/12/2018    Colorectal Cancer Screening 06/20/2022    Cervical Cancer Screening Not Due       Future Appointments   Date Time Provider Department Center   12/12/2022  8:20 AM [facility] [place] [facility]   4/28/2023  8:10 AM NANI Babin [facility] GYN [place] [facility]   6/12/2023  8:00 AM Shira Funk NP [facility]      Wellness labs this morning- will call with results  Follow up in 6 months         Signature:  Shira Funk NP  [facility]  [address] [facility] [address]    Date of encounter: 12/12/22

## 2022-12-12 NOTE — TELEPHONE ENCOUNTER
Attempted to reach patient to discuss lab results. No answer. Call directly transferred to voicemail. Will try again.

## 2022-12-12 NOTE — ASSESSMENT & PLAN NOTE
BMI 32.92  Educated on increased risk of disease s/t obesity.  Educated on health benefits of at least 5 days/ week of 30 minutes moderate intensity exercise (brisk walking) and 2 or more days/ week of muscle strength activities (as tolerated).  Eat well balanced diet of fresh fruits/ vegetables, whole grains, lean meats and limit high carbohydrate foods.

## 2022-12-12 NOTE — ASSESSMENT & PLAN NOTE
Vit D- labs ordered   Educated on increasing foods high in Vitamin D such as mushrooms, fish oil, cod liver, salmon, tuna, egg yolks, milk fortified with Vitamin D and foods fortified with Vitamin D such as cereals and oatmeal.  Daily supplementation of Vitamin D 2000 IU daily in addition to Calcium supplementation 1000 mg- 1200 mg daily.

## 2022-12-12 NOTE — ASSESSMENT & PLAN NOTE
/76  Follow low sodium diet, < 2 gm/day (avoid high salty foods such as processed meats/ sausage/hsieh/ sandwich meat, chips, pickles, cheese, crackers and soft drinks/ electrolyte replacement drinks).  Avoid tobacco/ alcohol use  Educated on health benefits of at least 5 days/ week of 30 minutes moderate intensity exercise (brisk walking) and 2 or more days/ week of muscle strength activities  Daily ASA 81 mg for CV prevention  Continue current medication regimen

## 2022-12-12 NOTE — ASSESSMENT & PLAN NOTE
Did not receive appointment with pain management. Referral ordered again to Dr. Barton and encouraged f/u with Dr. Cid after completing PT

## 2022-12-13 LAB — PATH REV: NORMAL

## 2022-12-13 NOTE — TELEPHONE ENCOUNTER
Attempted to contact patient to discuss lab results. No answer. VM left asking for return call to clinic to discuss at [phone].    [facility]  Office: [address], DO, Alex Max, DO, Karan Pacheco, DO, Lay Gibson Blood, DO, Angel Peñaloza MD, Sharifa Lemon MD, Flaca Philip MD, Linnea Vazquez MD, Berna Pardo MD, Kevin Huertas MD, Alvina Escobedo MD, Shane Zhong, DO, Cosmo Hendrickson, DO, Brett Frazier MD,  Rhea Amaya, DO, Maddy Norris MD, Rosamaria Nath MD, Enrico Florez MD, Janet Mehta MD, Dennis Rudolph MD, Teodoro Wolf MD, Augusta Vargas MD, Cisco Saul [facility], CNP, Nahun De La Torre, CNP, Oleksandr Peañ, CNS, Paula Sandoval, CNP, Mena Daniels, CNP, Camryn Mayes, CNP, Audelia Villegas, CNP, Matti Talbot, CNP, Elisabet Abernathy PA-C, Beto Hernandez DNP, Francine Brown DNP, Radha Bowles, CNP, Jayla Goodman, CNP, Clair Nieves, CNP, Pro Mendoza, CNP, Claudette David, CNP, Edna Zepeda, [address] [place] [address]    Progress Note    1/13/2022    9:55 AM    Name:   Pinky Klinefelter  MRN:     [id number]     Acct:      [de-identified]   Room:   [address] [place] [address] Day:  5  Admit Date:  1/8/2022  9:49 AM    PCP:   MOHAMUD Calixto CNP  Code Status:  DNR-CCA    Subjective:     C/C:   Chief Complaint   Patient presents with    Shortness of Breath    Nausea & Vomiting     Interval History Status: improved.          Brief History:     Presented with cough shortness of breath on January 8 symptoms began 2 weeks prior to that he did have a COVID-positive test he was found to have COVID-pneumonia acute hypoxemic respiratory failure he has been treated for that     Also found to be in A. fib with RVR has a known history of peripheral artery disease and hyperlipidemia as well as aortic stenosis I cannot find an echo in the chart there is 1 that has been performed all of the results has not been released     He is currently on amiodarone drip getting Lopressor which she has not responded to very well had elevated troponins he is pending an ischemic work-up by cardiology they are waiting until he is further stabilized/convalescent     He also has an JULES on CKD but is believed to be prerenal with a possible component of intrarenal pathology related to COVID diagnosis     His blood sugars are currently elevated secondary to Decadron     He is currently on meropenem for bacterial pneumonia he could not receive remdesivir because of his JULES he has received Decadron and Actemra.       Review of Systems:     Constitutional:  negative for chills, fevers, sweats  Respiratory:  negative for cough, dyspnea on exertion, shortness of breath, wheezing  Cardiovascular:  negative for chest pain, chest pressure/discomfort, lower extremity edema, palpitations  Gastrointestinal:  negative for abdominal pain, constipation, diarrhea, nausea, vomiting  Neurological:  negative for dizziness, headache    Medications:      Allergies:  No Known Allergies    Current Meds:   Scheduled Meds:    insulin glargine  20 Units SubCUTAneous QAM    senna  2 tablet Oral Nightly    insulin lispro  0-6 Units SubCUTAneous TID WC    insulin lispro  0-3 Units SubCUTAneous Nightly    [START ON 1/14/2022] amiodarone  200 mg Oral Daily    meropenem  500 mg IntraVENous Q12H    metoprolol tartrate  50 mg Oral BID    pantoprazole  40 mg Oral QAM AC    atorvastatin  20 mg Oral Nightly    cilostazol  100 mg Oral BID    clopidogrel  75 mg Oral Daily    vitamin B-12  1,000 mcg Oral Daily    aspirin  81 mg Oral Daily    sodium chloride flush  5-40 mL IntraVENous 2 times per day    dexamethasone  6 mg IntraVENous Q24H     Continuous Infusions:    dextrose      heparin (PORCINE) Infusion 12.946 Units/kg/hr (01/13/22 0934)    IV infusion builder 50 mL/hr at 01/13/22 0934    sodium chloride       PRN Meds: sodium chloride, LORazepam, glucose, dextrose, glucagon (rDNA), dextrose, metoprolol, heparin (porcine), heparin (porcine), potassium chloride **OR** potassium alternative oral replacement **OR** potassium chloride, benzonatate, sodium chloride flush, sodium chloride flush, sodium chloride, ondansetron **OR** ondansetron, polyethylene glycol, acetaminophen **OR** acetaminophen    Data:     Past Medical History:   has a past medical history of Chronic lower back pain, Cobalamin deficiency, CVA (cerebral vascular accident) ([facility] Diabetes mellitus ([facility] Hyperlipidemia, Hypertension, Malignant neoplasm of colon ([facility] and PAD (peripheral artery disease) ([facility] Social History:   reports that he has quit smoking. He has never used smokeless tobacco. He reports previous alcohol use. He reports that he does not use drugs. Family History: History reviewed. No pertinent family history. Vitals:  BP (!) 129/47   Pulse 73   Temp 98.1 °F (36.7 °C) (Axillary)   Resp 20   Ht 5' 10\" (1.778 m)   Wt 148 lb 2.4 oz (67.2 kg)   SpO2 (!) 88%   BMI 21.26 kg/m²   Temp (24hrs), Av.9 °F (36.6 °C), Min:97.9 °F (36.6 °C), Max:98.1 °F (36.7 °C)    Recent Labs     22  1108 22  1559 22  0707   POCGLU 183* 203* 182* 176*       I/O (24Hr):     Intake/Output Summary (Last 24 hours) at 2022 0955  Last data filed at 2022 1907  Gross per 24 hour   Intake 600 ml   Output 430 ml   Net 170 ml       Labs:  Hematology:  Recent Labs     01/10/22  1038 22  0519 22  0352   WBC 20.6* 18.6* 17.4*   RBC 4.20* 4.31 3.53*   HGB 12.5* 13.1 10.7*   HCT 36.3* 36.5* 30.4*   MCV 86.4 84.7 86.1   MCH 29.8 30.4 30.3   MCHC 34.4 35.9* 35.2*   RDW 13.5 13.2 13.3    348 309   MPV 10.4 10.8 10.4     Chemistry:  Recent Labs     22  0519 22  0352    135   K 3.7 4.8    105   CO2 16* 17*   GLUCOSE 229* 194*   BUN 56* 56*   CREATININE 2.38* 2.16*   ANIONGAP 19* 13   LABGLOM 26* 30*   GFRAA 32* 36*   CALCIUM 8.5* 8.3*     Recent Labs     22  0659 22  1108 22  1559 22  0707   POCGLU 163* 244* 183* 203* 182* 176* ABG:  Lab Results   Component Value Date    FIO2 INFORMATION NOT PROVIDED 01/10/2022     Lab Results   Component Value Date/Time    SPECIAL NOT REPORTED 01/09/2022 11:55 AM    SPECIAL NOT REPORTED 01/09/2022 11:55 AM     Lab Results   Component Value Date/Time    CULTURE NO GROWTH 3 DAYS 01/09/2022 11:55 AM    CULTURE NO GROWTH 3 DAYS 01/09/2022 11:55 AM       Radiology:  XR CHEST PORTABLE    Result Date: 1/11/2022  Unchanged bilateral airspace opacities     XR CHEST PORTABLE    Result Date: 1/9/2022  Scattered infiltrates consistent with pneumonia. CT CHEST PULMONARY EMBOLISM W CONTRAST    Result Date: 1/8/2022  *No evidence of pulmonary embolism. *Extensive ground-glass opacification of the bilateral lung fields with peripheral involvement slight apical as well as basilar sparing. Imaging features suggestive of COVID-19 pneumonia, though are nonspecific and can occur with a variety of infectious and noninfectious processes. US RETROPERITONEAL COMPLETE    Result Date: 1/10/2022  The right kidney could not be adequately visualized. Left kidney shows mild increased cortical echogenicity suggesting medical renal disease. No hydronephrosis.        Physical Examination:        General appearance:  Somnolent but doing well, wearing bipap  Mental Status:  oriented to person, place and time and normal affect  Lungs:  clear to auscultation bilaterally, normal effort  Heart:  regular rate and rhythm, no murmur  Abdomen:  Not obese non tender non distended  Extremities:  no edema, redness, tenderness in the calves  Skin:  no gross lesions, rashes, induration    Assessment:        Hospital Problems           Last Modified POA    * (Principal) Pneumonia due to COVID-19 virus 1/8/2022 Yes    Essential hypertension 1/8/2022 Yes    Hyperlipidemia 1/8/2022 Yes    Acute hypoxemic respiratory failure due to COVID-19 [facility]) 1/8/2022 Yes    Acute kidney injury superimposed on chronic kidney disease ([facility] baseline

## 2022-12-14 ENCOUNTER — TELEPHONE (OUTPATIENT)
Dept: INTERNAL MEDICINE | Facility: CLINIC | Age: 52
End: 2022-12-14
Payer: MEDICAID

## 2022-12-14 DIAGNOSIS — E11.9 TYPE 2 DIABETES MELLITUS WITHOUT COMPLICATION, WITHOUT LONG-TERM CURRENT USE OF INSULIN: ICD-10-CM

## 2022-12-14 DIAGNOSIS — E55.9 VITAMIN D DEFICIENCY: Primary | ICD-10-CM

## 2022-12-14 RX ORDER — ASPIRIN 325 MG
50000 TABLET, DELAYED RELEASE (ENTERIC COATED) ORAL
Qty: 8 CAPSULE | Refills: 0 | Status: SHIPPED | OUTPATIENT
Start: 2022-12-14

## 2022-12-14 NOTE — TELEPHONE ENCOUNTER
Pt did not verbalize understanding with diabetes results verbalized understanding for other results. Pt became very rude on the phone requested call back from GREG Funk and did not want further explaining from me in regards to your message.

## 2022-12-14 NOTE — TELEPHONE ENCOUNTER
Attempted to reach patient to discuss lab results. No answer. VM left asking for return call to clinic.

## 2022-12-14 NOTE — TELEPHONE ENCOUNTER
I have attempted to contact patient x 3 without success.     Please attempt to reach patient and if no answer, please mail letter to contact our clinic for lab results as below:    Labs reviewed and overall all look within acceptable ranges, except vitamin D and diabetes levels.     Vit D level low 23.6 (normal 30-80). I sent prescription for vitamin D replacement 82680 IU to be taken ONCE A WEEK for total of 8 weeks. Once she is complete with prescription, she needs to take vitamin D 2000 IU once daily for supplementation.    Diabetes level is elevated at 6.5% which puts her in the category for diabetes diagnosis. I do encourage her to follow low carb diet. I placed referral to diabetes education for education on this. Encourage regular exercise. If she is interested we can start medication. Please let me know if she is and I will send prescription for Metformin 500 mg to be taken once daily with food. Common s/e include GI upset, nausea and diarrhea.     She will need to be scheduled for f/u visit in 3 months with repeat A1c (labs).    Please let me know her response.

## 2022-12-15 RX ORDER — INSULIN PUMP SYRINGE, 3 ML
EACH MISCELLANEOUS
Qty: 1 EACH | Refills: 0 | Status: SHIPPED | OUTPATIENT
Start: 2022-12-15

## 2022-12-15 RX ORDER — LANCETS
EACH MISCELLANEOUS
Qty: 100 EACH | Refills: 3 | Status: SHIPPED | OUTPATIENT
Start: 2022-12-15 | End: 2023-06-12 | Stop reason: SDUPTHER

## 2022-12-15 RX ORDER — METFORMIN HYDROCHLORIDE 500 MG/1
500 TABLET ORAL
Qty: 90 TABLET | Refills: 2 | Status: SHIPPED | OUTPATIENT
Start: 2022-12-15 | End: 2023-11-28

## 2022-12-15 NOTE — TELEPHONE ENCOUNTER
I attempted to contact patient again without success. VM left informing patient she can call clinic and staff should be able to answer any questions she may have. Since I have not been successful in reaching her, please ask her what specific questions she has, and I can provide answers for her if I am unable to talk with her.     Thank you

## 2022-12-15 NOTE — TELEPHONE ENCOUNTER
I attempted to contact her x 2 without success. VM left asking to return call to clinic.    Rx sent for Metformin 500 mg once daily with meals and glucometer/ testing supplies to pharmacy.

## 2022-12-15 NOTE — TELEPHONE ENCOUNTER
Pt stated that you can send the metformin to her pharmacy and pt would still like to speak to you.

## 2023-01-30 ENCOUNTER — HOSPITAL ENCOUNTER (OUTPATIENT)
Dept: RADIOLOGY | Facility: HOSPITAL | Age: 53
Discharge: HOME | End: 2023-01-30
Attending: NURSE PRACTITIONER
Payer: MEDICAID

## 2023-01-30 DIAGNOSIS — Z12.31 VISIT FOR SCREENING MAMMOGRAM: ICD-10-CM

## 2023-01-30 PROCEDURE — 77067 SCR MAMMO BI INCL CAD: CPT | Mod: TC

## 2023-01-30 PROCEDURE — 77067 SCR MAMMO BI INCL CAD: CPT | Mod: 26,,, | Performed by: RADIOLOGY

## 2023-01-30 PROCEDURE — 77063 MAMMO DIGITAL SCREENING BILAT WITH TOMO: ICD-10-PCS | Mod: 26,,, | Performed by: RADIOLOGY

## 2023-01-30 PROCEDURE — 77063 BREAST TOMOSYNTHESIS BI: CPT | Mod: 26,,, | Performed by: RADIOLOGY

## 2023-01-30 PROCEDURE — 77067 MAMMO DIGITAL SCREENING BILAT WITH TOMO: ICD-10-PCS | Mod: 26,,, | Performed by: RADIOLOGY

## 2023-02-08 RX ORDER — PANTOPRAZOLE SODIUM 40 MG/1
40 TABLET, DELAYED RELEASE ORAL DAILY
OUTPATIENT
Start: 2023-02-08

## 2023-02-08 RX ORDER — FAMOTIDINE 40 MG/1
40 TABLET, FILM COATED ORAL
OUTPATIENT
Start: 2023-02-08

## 2023-02-08 NOTE — TELEPHONE ENCOUNTER
Pt called for a refill on famotidine and pantroprazole. Pt states she has been without her medication for week, her stomach is hurting and she is gassy and needs her meds    Lov: 12/12/22  Nov: 06/12/23

## 2023-02-16 ENCOUNTER — PATIENT OUTREACH (OUTPATIENT)
Dept: ADMINISTRATIVE | Facility: HOSPITAL | Age: 53
End: 2023-02-16
Payer: MEDICAID

## 2023-02-16 NOTE — PROGRESS NOTES
Population Health Outreach. Records Received. Hyperlinked into chart at this time. The following record(s) below were uploaded for Health Maintenance.    Pap Smear /HPV 2/11/2019

## 2023-03-09 NOTE — PROGRESS NOTES
"  Shira Buchanan NP   [facility]  [address] [facility] [address]      PATIENT NAME: Tova Stack  : 1970  DATE: 5/10/22  MRN: [id number]      Billing Provider: Shira Buchanan NP  Level of Service:   Patient PCP Information     Provider PCP Type    Shira Buchanan NP General          Reason for Visit / Chief Complaint: Follow-up (arthritis)       History of Present Illness / Problem Focused Workflow     Tova Stack presents to the clinic with Follow-up (arthritis)     50 yo AAF for follow up. She continues to experience chronic pain in low back, hips and right leg. She admits to having right leg pain, right hip, right knee and right ankle pain. She states sometimes her right ankle gives out on her causing her to fall. She gordon any numbness/ tingling in right leg. She was evaluated by Neurosurgeon and states "she said I need to see a medicine doctor." She states she has visit with an Orthopedic at the end of this month and unsure provider name or why she is seeing this provider. She does not recall neurosurgeon ordering referral to a new provider but she is not sure. No other concerns.    Other providers  [facility]- referral ordered/ pending appt ?   Dr. Fischer [facility]      Review of Systems     Review of Systems   Constitutional: Negative.    HENT: Negative.    Eyes: Negative.    Respiratory: Negative.    Cardiovascular: Negative.    Gastrointestinal: Negative.    Endocrine: Negative.    Genitourinary: Negative.    Musculoskeletal: Positive for arthralgias, back pain and myalgias.   Skin: Negative.    Allergic/Immunologic: Negative.    Neurological: Negative.    Hematological: Negative.    Psychiatric/Behavioral: Negative.        Medical / Social / Family History     Past Medical History:   Diagnosis Date    Hypertension        Past Surgical History:   Procedure Laterality Date "     SECTION      4 total    TUBAL LIGATION         Social History    reports that she has quit smoking. Her smoking use included cigarettes. She quit smokeless tobacco use about 3 years ago. She reports previous drug use. She reports that she does not drink alcohol.    Family History  's family history includes Diabetes in her mother; Heart disease in her sister; Hypertension in her father, mother, and sister.    Medications and Allergies     Medications  Outpatient Medications Marked as Taking for the 5/10/22 encounter (Office Visit) with Shira Funk NP   Medication Sig Dispense Refill    amLODIPine (NORVASC) 5 MG tablet Take 5 mg by mouth.      cholecalciferol, vitamin D3, (VITAMIN D3) 50 mcg (2,000 unit) Cap Take 50 mcg by mouth.      [DISCONTINUED] ferrous sulfate 325 (65 FE) MG EC tablet Take 325 mg by mouth.      [DISCONTINUED] hydrOXYzine pamoate (VISTARIL) 25 MG Cap Take 25 mg by mouth 3 (three) times daily as needed.      [DISCONTINUED] ibuprofen (ADVIL,MOTRIN) 800 MG tablet Take 800 mg by mouth 3 (three) times daily.      [DISCONTINUED] meloxicam (MOBIC) 15 MG tablet Take 15 mg by mouth.      [DISCONTINUED] methocarbamoL (ROBAXIN) 500 MG Tab Take 1,000 mg by mouth 4 (four) times daily as needed.         Allergies  Review of patient's allergies indicates:  No Known Allergies    Physical Examination     Vitals:    05/10/22 1001   BP: 122/84   Pulse: 76   Resp: 16   Temp: 98.2 °F (36.8 °C)     Physical Exam  Vitals and nursing note reviewed.   Constitutional:       Appearance: Normal appearance. She is not ill-appearing.   HENT:      Head: Normocephalic.      Right Ear: Tympanic membrane normal.      Left Ear: Tympanic membrane normal.      Nose: Nose normal.      Mouth/Throat:      Mouth: Mucous membranes are moist.   Eyes:      Extraocular Movements: Extraocular movements intact.      Conjunctiva/sclera: Conjunctivae normal.      Pupils: Pupils are equal, round, and  reactive to light.   Cardiovascular:      Rate and Rhythm: Normal rate and regular rhythm.      Pulses: Normal pulses.   Pulmonary:      Effort: Pulmonary effort is normal. No respiratory distress.      Breath sounds: Normal breath sounds.   Abdominal:      General: Bowel sounds are normal. There is no distension.      Palpations: Abdomen is soft. There is no mass.      Tenderness: There is no abdominal tenderness.      Hernia: No hernia is present.   Musculoskeletal:         General: Tenderness (low back, b/l hips, right knee and right ankle;  ROM intact right knee and right ankle; steady gait ) present. Normal range of motion.      Cervical back: Normal range of motion and neck supple.      Right lower leg: No edema.      Left lower leg: No edema.   Skin:     General: Skin is warm and dry.      Capillary Refill: Capillary refill takes less than 2 seconds.   Neurological:      Mental Status: She is alert and oriented to person, place, and time. Mental status is at baseline.      Motor: No weakness.   Psychiatric:         Mood and Affect: Mood normal.         Behavior: Behavior normal.         Thought Content: Thought content normal.         Judgment: Judgment normal.           Results     Lab Results   Component Value Date    WBC 6.4 09/27/2018    RBC 4.11 09/27/2018    HGB 11.1 (L) 09/27/2018    HCT 33.7 (L) 09/27/2018    MCV 82.0 09/27/2018    MCH 27.0 09/27/2018    MCHC 32.9 09/27/2018    RDW 15.0 (H) 09/27/2018     (H) 09/27/2018    MPV 9.3 09/27/2018     CMP  Sodium Level   Date Value Ref Range Status   09/27/2018 140 136 - 145 mmol/L Final     Potassium Level   Date Value Ref Range Status   09/27/2018 3.4 (L) 3.5 - 5.1 mmol/L Final     Carbon Dioxide   Date Value Ref Range Status   09/27/2018 28 21 - 32 mmol/L Final     Blood Urea Nitrogen   Date Value Ref Range Status   09/27/2018 8 7 - 18 mg/dL Final     Creatinine   Date Value Ref Range Status   09/27/2018 0.60 0.60 - 1.30 mg/dL Final     Calcium  09-Mar-2023 21:25 Level Total   Date Value Ref Range Status   09/27/2018 8.8 8.5 - 10.1 mg/dL Final     Albumin Level   Date Value Ref Range Status   09/27/2018 3.8 3.4 - 5.0 gm/dL Final     Bilirubin Total   Date Value Ref Range Status   09/27/2018 0.3 0.2 - 1.0 mg/dL Final     Alkaline Phosphatase   Date Value Ref Range Status   09/27/2018 68 45 - 117 unit/L Final     Aspartate Aminotransferase   Date Value Ref Range Status   09/27/2018 14 (L) 15 - 37 unit/L Final     Alanine Aminotransferase   Date Value Ref Range Status   09/27/2018 14 12 - 78 unit/L Final     Estimated GFR-Non    Date Value Ref Range Status   09/27/2018 >105 >>=90 mL/min Final     Lab Results   Component Value Date    CHOL 161 04/12/2018     Lab Results   Component Value Date    HDL 52 04/12/2018     No results found for: LDLCALC  Lab Results   Component Value Date    TRIG 70 04/12/2018     No results found for: CHOLHDL  Lab Results   Component Value Date    TSH 1.580 04/12/2018     Lab Results   Component Value Date    PHUR 7.0 10/08/2018    PROTEINUA Negative 10/08/2018    GLUCUA Normal 10/08/2018    KETONESU Negative 10/08/2018    OCCULTUA Negative 10/08/2018    NITRITE Negative 10/08/2018    LEUKOCYTESUR Negative 10/08/2018           Assessment and Plan (including Health Maintenance)     Plan:         Health Maintenance Due   Topic Date Due    Hepatitis C Screening  Never done    Cervical Cancer Screening  Never done    TETANUS VACCINE  Never done    Colorectal Cancer Screening  Never done    Shingles Vaccine (1 of 2) Never done    Mammogram  01/13/2022    COVID-19 Vaccine (4 - Booster for Moderna series) 04/07/2022       Problem List Items Addressed This Visit        Neuro    Lumbar degenerative disc disease    Overview     MRI lumbar spine 1/19/22 FINDINGS:  For the purpose of this report, the most inferior well  developed intervertebral disc space is presumed to represent L5-S1.  Lumbar vertebrae stature is maintained and alignment  is unremarkable.  No acute marrow edematous signal. The visualized thoracic cord is  unremarkable. The conus medullaris terminates at T12. The L3-L4, L4-L5  and L5-S1 discs are partially desiccated. Disc segmental analysis is  given below:     At L1-L2, disc is unremarkable. Central canal is not stenosed. There  are no narrowings of the neuroforamen.     At L2-L3, disc is unremarkable. Bilateral facets arthropathy. There is  no significant central canal stenosis. There are no narrowings of the  neuroforamen.     At L3-L4, disc height is preserved. Bilateral degenerative hypertrophy  of the facet joints and mild ligamentum flavum thickening. There is no  significant central canal stenosis. There are no narrowings of the  neuroforamen.     At L4-L5, there is bulging of annulus fibrosis which flattens the  ventral thecal sac. There is bilateral symmetrical thickening of  ligamentum flavum and facets arthropathy. These findings combine to  cause mild central canal stenosis. There are no narrowings of the  neuroforamen.     At L5-S1, there is central disc bulge which indents the ventral thecal  sac. Bilateral facets arthropathy. Central canal is not stenosed.  There are no narrowings of the neuroforamen     IMPRESSION:     Lumbar degenerative disc disease and spondylosis level by level  discussed above.  Evaluated by Neurosurgeon in [place] [facility] 3/14/22- refer to note           Current Assessment & Plan     Continue HEP. Rx refill for Meloxicam and Methocarbamol to be used PRN. Referral to pain mgmt provider Dr. Barton in [place], [place]           Relevant Orders    Ambulatory referral/consult to Pain Clinic    Lumbar spondylosis    Overview     See LDD.           Current Assessment & Plan     See LDD.           Relevant Orders    Ambulatory referral/consult to Pain Clinic       Orthopedic    Osteoarthritis, hip, bilateral    Overview     XR b/l hips 3/23/22 with minimal degenerative changes  [facility] does not treat hip disorders/disease           Current Assessment & Plan     She thinks she has upcoming appt with Orthopedist end of this month- pt will check paperwork at home and let provider know           Right ankle pain    Overview     C/o right ankle pain and describes as giving out on her at times            Current Assessment & Plan     XR right ankle today for evaluation, will call with results           Relevant Orders    X-Ray Ankle Complete Right    Right knee pain    Overview     C/o right knee pain           Current Assessment & Plan     XR right knee, will call with results            Relevant Orders    X-Ray Knee 3 View Right          Health Maintenance Topics with due status: Not Due       Topic Last Completion Date    Lipid Panel 04/12/2018    Influenza Vaccine Not Due       Future Appointments   Date Time Provider Department Center   5/30/2022  1:00 PM Sarah Martinez PA-C [facility] ORTHO [place]   6/27/2022  3:20 PM UROLOGY CLINIC 2 [facility] UROLO [place]   8/10/2022  8:15 AM Shira Funk NP [facility] INTMED [place]   11/30/2022  7:30 AM MANI Mohan [facility] GYN [place]            Signature:  Shira Buchanan NP  [facility]  [address] [facility] [address]    Date of encounter: 5/10/22

## 2023-06-12 ENCOUNTER — TELEPHONE (OUTPATIENT)
Dept: INTERNAL MEDICINE | Facility: CLINIC | Age: 53
End: 2023-06-12

## 2023-06-12 ENCOUNTER — LAB VISIT (OUTPATIENT)
Dept: LAB | Facility: HOSPITAL | Age: 53
End: 2023-06-12
Attending: NURSE PRACTITIONER
Payer: MEDICAID

## 2023-06-12 ENCOUNTER — OFFICE VISIT (OUTPATIENT)
Dept: INTERNAL MEDICINE | Facility: CLINIC | Age: 53
End: 2023-06-12
Payer: MEDICAID

## 2023-06-12 VITALS
RESPIRATION RATE: 20 BRPM | WEIGHT: 174.81 LBS | SYSTOLIC BLOOD PRESSURE: 130 MMHG | TEMPERATURE: 98 F | BODY MASS INDEX: 32.17 KG/M2 | HEIGHT: 62 IN | DIASTOLIC BLOOD PRESSURE: 82 MMHG | HEART RATE: 78 BPM

## 2023-06-12 DIAGNOSIS — E11.9 TYPE 2 DIABETES MELLITUS WITHOUT COMPLICATION, WITHOUT LONG-TERM CURRENT USE OF INSULIN: Primary | ICD-10-CM

## 2023-06-12 DIAGNOSIS — F41.1 GENERALIZED ANXIETY DISORDER: ICD-10-CM

## 2023-06-12 DIAGNOSIS — M51.36 LUMBAR DEGENERATIVE DISC DISEASE: ICD-10-CM

## 2023-06-12 DIAGNOSIS — E11.9 TYPE 2 DIABETES MELLITUS WITHOUT COMPLICATION, WITHOUT LONG-TERM CURRENT USE OF INSULIN: ICD-10-CM

## 2023-06-12 DIAGNOSIS — B96.81 H PYLORI ULCER: ICD-10-CM

## 2023-06-12 DIAGNOSIS — E55.9 VITAMIN D DEFICIENCY: ICD-10-CM

## 2023-06-12 DIAGNOSIS — I10 HYPERTENSION, UNSPECIFIED TYPE: ICD-10-CM

## 2023-06-12 DIAGNOSIS — M47.816 LUMBAR SPONDYLOSIS: ICD-10-CM

## 2023-06-12 DIAGNOSIS — B35.1 ONYCHOMYCOSIS: ICD-10-CM

## 2023-06-12 DIAGNOSIS — K27.9 H PYLORI ULCER: ICD-10-CM

## 2023-06-12 PROBLEM — E66.9 OBESITY: Status: RESOLVED | Noted: 2022-05-10 | Resolved: 2023-06-12

## 2023-06-12 PROBLEM — R73.03 PREDIABETES: Status: RESOLVED | Noted: 2022-05-10 | Resolved: 2023-06-12

## 2023-06-12 LAB
EST. AVERAGE GLUCOSE BLD GHB EST-MCNC: 131.2 MG/DL
HBA1C MFR BLD: 6.2 %

## 2023-06-12 PROCEDURE — 99214 OFFICE O/P EST MOD 30 MIN: CPT | Mod: S$PBB,,, | Performed by: NURSE PRACTITIONER

## 2023-06-12 PROCEDURE — 99214 OFFICE O/P EST MOD 30 MIN: CPT | Mod: PBBFAC | Performed by: NURSE PRACTITIONER

## 2023-06-12 PROCEDURE — 36415 COLL VENOUS BLD VENIPUNCTURE: CPT

## 2023-06-12 PROCEDURE — 83036 HEMOGLOBIN GLYCOSYLATED A1C: CPT

## 2023-06-12 PROCEDURE — 3075F PR MOST RECENT SYSTOLIC BLOOD PRESS GE 130-139MM HG: ICD-10-PCS | Mod: CPTII,,, | Performed by: NURSE PRACTITIONER

## 2023-06-12 PROCEDURE — 1159F MED LIST DOCD IN RCRD: CPT | Mod: CPTII,,, | Performed by: NURSE PRACTITIONER

## 2023-06-12 PROCEDURE — 3008F PR BODY MASS INDEX (BMI) DOCUMENTED: ICD-10-PCS | Mod: CPTII,,, | Performed by: NURSE PRACTITIONER

## 2023-06-12 PROCEDURE — 3079F DIAST BP 80-89 MM HG: CPT | Mod: CPTII,,, | Performed by: NURSE PRACTITIONER

## 2023-06-12 PROCEDURE — 99214 PR OFFICE/OUTPT VISIT, EST, LEVL IV, 30-39 MIN: ICD-10-PCS | Mod: S$PBB,,, | Performed by: NURSE PRACTITIONER

## 2023-06-12 PROCEDURE — 3079F PR MOST RECENT DIASTOLIC BLOOD PRESSURE 80-89 MM HG: ICD-10-PCS | Mod: CPTII,,, | Performed by: NURSE PRACTITIONER

## 2023-06-12 PROCEDURE — 1159F PR MEDICATION LIST DOCUMENTED IN MEDICAL RECORD: ICD-10-PCS | Mod: CPTII,,, | Performed by: NURSE PRACTITIONER

## 2023-06-12 PROCEDURE — 3008F BODY MASS INDEX DOCD: CPT | Mod: CPTII,,, | Performed by: NURSE PRACTITIONER

## 2023-06-12 PROCEDURE — 3075F SYST BP GE 130 - 139MM HG: CPT | Mod: CPTII,,, | Performed by: NURSE PRACTITIONER

## 2023-06-12 RX ORDER — CICLOPIROX 80 MG/ML
SOLUTION TOPICAL NIGHTLY
Qty: 6.6 ML | Refills: 11 | Status: SHIPPED | OUTPATIENT
Start: 2023-06-12

## 2023-06-12 RX ORDER — LANCETS 33 GAUGE
EACH MISCELLANEOUS
COMMUNITY
Start: 2022-12-15

## 2023-06-12 RX ORDER — HYDROXYZINE PAMOATE 25 MG/1
25 CAPSULE ORAL 3 TIMES DAILY PRN
Qty: 90 CAPSULE | Refills: 6 | Status: SHIPPED | OUTPATIENT
Start: 2023-06-12 | End: 2023-12-12 | Stop reason: SDUPTHER

## 2023-06-12 RX ORDER — AMLODIPINE BESYLATE 5 MG/1
5 TABLET ORAL DAILY
Qty: 90 TABLET | Refills: 2 | Status: SHIPPED | OUTPATIENT
Start: 2023-06-12 | End: 2023-12-12 | Stop reason: SDUPTHER

## 2023-06-12 RX ORDER — LANCETS 30 GAUGE
EACH MISCELLANEOUS
COMMUNITY
Start: 2022-12-15

## 2023-06-12 RX ORDER — IBUPROFEN 800 MG/1
800 TABLET ORAL 3 TIMES DAILY PRN
Qty: 60 TABLET | Refills: 2 | Status: SHIPPED | OUTPATIENT
Start: 2023-06-12

## 2023-06-12 RX ORDER — PANTOPRAZOLE SODIUM 40 MG/1
40 TABLET, DELAYED RELEASE ORAL DAILY
Qty: 90 TABLET | Refills: 2 | Status: SHIPPED | OUTPATIENT
Start: 2023-06-12

## 2023-06-12 NOTE — PROGRESS NOTES
Shira L Good, NP   [facility]  [address] [address] [address]      PATIENT NAME: Tova Stack  : 1970  DATE: 23  MRN: [id number]        Reason for Visit / Chief Complaint: Back Pain and Nail Problem       History of Present Illness / Problem Focused Workflow     Tova Stack presents to the clinic with Back Pain and Nail Problem     51 yo AAF for follow up/ labs. PMH includes HTN, DM, anemia, lumbago, lumbar DDD, OA hip, anxiety, h/o tobacco abuse. /82. Last A1c 6.5%. Labs pending today. She states she has not started Metformin. She has been checking blood sugars 109,117,120; checking twice daily. She is not eating rice. Wt loss 6#.  Indication refills.  Meloxicam does not work for back pain.  Requesting ibuprofen.  She denies having follow up with neurosurgeon.  Patient will contact provider.  Complaining of bilateral great toe toenail fungus.  Otherwise, denies any other concerns or complaints at this time.    Other providers   Dr. Cid- Neurosurgery at [facility]  Dr. Fischer- GI  [facility] GYN        Review of Systems     Review of Systems   Constitutional: Negative.    HENT: Negative.     Eyes: Negative.    Respiratory: Negative.     Cardiovascular: Negative.    Gastrointestinal: Negative.    Endocrine: Negative.    Genitourinary: Negative.    Musculoskeletal:  Positive for back pain (Chronic).   Skin: Negative.         Toenail fungus   Allergic/Immunologic: Negative.    Neurological: Negative.    Hematological: Negative.    Psychiatric/Behavioral: Negative.       Medical / Social / Family History     ----------------------------  GERD (gastroesophageal reflux disease)  Hypertension  Personal history of colonic polyps     Past Surgical History:   Procedure Laterality Date     SECTION      4 total    COLONOSCOPY  2022    TUBAL LIGATION         Social History     Socioeconomic History    Marital status: Single    Tobacco Use    Smoking status: Former     Packs/day: 1.00     Years: 5.00     Pack years: 5.00     Types: Cigarettes     Quit date: 2019     Years since quittin.4    Smokeless tobacco: Never   Substance and Sexual Activity    Alcohol use: Never    Drug use: Not Currently     Types: Cocaine     Comment:     Sexual activity: Yes     Partners: Male     Social Determinants of Health     Physical Activity: Inactive    Days of Exercise per Week: 0 days    Minutes of Exercise per Session: 0 min        Family History   Problem Relation Age of Onset    Hypertension Mother     Diabetes Mother     Hypertension Father     Heart disease Sister     Hypertension Sister     Stroke Sister     Cancer Maternal Grandmother     Stomach cancer Maternal Aunt     Colon cancer Maternal Uncle         Medications and Allergies     Medications  Current Outpatient Medications   Medication Instructions    amLODIPine (NORVASC) 5 mg, Oral, Daily    blood sugar diagnostic Strp To check BG once daily, to use with insurance preferred meter    blood-glucose meter kit To check BG once daily, to use with insurance preferred meter    cholecalciferol (vitamin D3) 50,000 Units, Oral, Every 7 days    ciclopirox (PENLAC) 8 % Soln Topical (Top), Nightly    famotidine (PEPCID) 40 mg, Oral    ferrous sulfate 325 mg, Oral, Every Mon, Wed, Fri    hydrOXYzine pamoate (VISTARIL) 25 mg, Oral, 3 times daily PRN    ibuprofen (ADVIL,MOTRIN) 800 mg, Oral, 3 times daily PRN, Take with food/ milk. Avoid other NSAIDs.    metFORMIN (GLUCOPHAGE) 500 mg, Oral, With breakfast    ONETOUCH DELICA PLUS LANCET 33 gauge Misc USE TO TEST BLOOD SUGAR ONCE DAILY    ONETOUCH VERIO FLEX METER Misc USE TO CHECK BLOOD SUGAR ONCE DAILY    pantoprazole (PROTONIX) 40 mg, Oral, Daily       Allergies  Review of patient's allergies indicates:  No Known Allergies    Physical Examination     Visit Vitals  /82 (BP Location: Left arm, Patient Position: Sitting, BP Method: X-Large  "(Automatic))   Pulse 78   Temp 98 °F (36.7 °C) (Oral)   Resp 20   Ht 5' 2" (1.575 m)   Wt 79.3 kg (174 lb 12.8 oz)   LMP  (LMP Unknown)   BMI 31.97 kg/m²       Physical Exam  Vitals and nursing note reviewed.   Constitutional:       Appearance: Normal appearance. She is not ill-appearing.   HENT:      Head: Normocephalic.   Cardiovascular:      Rate and Rhythm: Normal rate and regular rhythm.      Pulses: Normal pulses.   Pulmonary:      Effort: Pulmonary effort is normal. No respiratory distress.      Breath sounds: Normal breath sounds.   Musculoskeletal:         General: Normal range of motion.      Cervical back: Normal range of motion and neck supple.      Right lower leg: No edema.      Left lower leg: No edema.   Feet:      Right foot:      Toenail Condition: Fungal disease present.     Left foot:      Toenail Condition: Fungal disease present.     Comments: Bilateral great toe  Skin:     General: Skin is warm and dry.      Capillary Refill: Capillary refill takes less than 2 seconds.   Neurological:      Mental Status: She is alert and oriented to person, place, and time. Mental status is at baseline.      Motor: No weakness.   Psychiatric:         Mood and Affect: Mood normal.         Behavior: Behavior normal.         Thought Content: Thought content normal.         Judgment: Judgment normal.         Results     Lab Results   Component Value Date    WBC 4.4 (L) 12/12/2022    RBC 4.66 12/12/2022    HGB 11.9 (L) 12/12/2022    HCT 37.2 12/12/2022    MCV 79.8 (L) 12/12/2022    MCH 25.5 (L) 12/12/2022    MCHC 32.0 (L) 12/12/2022    RDW 14.6 12/12/2022     12/12/2022    MPV 9.2 12/12/2022     Sodium Level   Date Value Ref Range Status   12/12/2022 140 136 - 145 mmol/L Final     Potassium Level   Date Value Ref Range Status   12/12/2022 4.1 3.5 - 5.1 mmol/L Final     Carbon Dioxide   Date Value Ref Range Status   12/12/2022 26 22 - 29 mmol/L Final     Blood Urea Nitrogen   Date Value Ref Range Status "   12/12/2022 14.4 9.8 - 20.1 mg/dL Final     Creatinine   Date Value Ref Range Status   12/12/2022 0.76 0.55 - 1.02 mg/dL Final     Calcium Level Total   Date Value Ref Range Status   12/12/2022 9.5 8.4 - 10.2 mg/dL Final     Albumin Level   Date Value Ref Range Status   12/12/2022 3.9 3.5 - 5.0 gm/dL Final     Bilirubin Total   Date Value Ref Range Status   12/12/2022 0.4 <=1.5 mg/dL Final     Alkaline Phosphatase   Date Value Ref Range Status   12/12/2022 79 40 - 150 unit/L Final     Aspartate Aminotransferase   Date Value Ref Range Status   12/12/2022 19 5 - 34 unit/L Final     Alanine Aminotransferase   Date Value Ref Range Status   12/12/2022 14 0 - 55 unit/L Final     Estimated GFR-Non    Date Value Ref Range Status   09/27/2018 >105 >>=90 mL/min Final     Lab Results   Component Value Date    CHOL 177 12/12/2022     Lab Results   Component Value Date    HDL 44 12/12/2022     No results found for: LDLCALC  Lab Results   Component Value Date    TRIG 66 12/12/2022     No results found for: CHOLHDL  Lab Results   Component Value Date    TSH 1.7638 12/12/2022     Lab Results   Component Value Date    PHUR 7.0 10/08/2018    PROTEINUA Negative 10/08/2018    GLUCUA Normal 10/08/2018    KETONESU Negative 10/08/2018    OCCULTUA Negative 10/08/2018    NITRITE Negative 10/08/2018    LEUKOCYTESUR Negative 10/08/2018     Lab Results   Component Value Date    HGBA1C 6.2 06/12/2023    HGBA1C 6.5 12/12/2022    HGBA1C 5.7 04/12/2018     No results found for: MICROALBUR, [id number]   No results found for this or any previous visit.         Assessment       ICD-10-CM ICD-9-CM   1. Type 2 diabetes mellitus without complication, without long-term current use of insulin  E11.9 250.00   2. Onychomycosis  B35.1 110.1   3. Hypertension, unspecified type  I10 401.9   4. Generalized anxiety disorder  F41.1 300.02   5. H pylori ulcer  K27.9 533.90    B96.81 041.86   6. Lumbar degenerative disc disease  M51.36 722.52   7.  Lumbar spondylosis  M47.816 721.3   8. Vitamin D deficiency  E55.9 268.9   9. BMI 31.0-31.9,adult  Z68.31 V85.31       Plan       1. Onychomycosis  Bilateral great toes with fungus.  Rx as prescribed.  Educated to disinfect nail equipment including discarding toenail Polish.  Remove all toenail Polish and allow nails open to air.  - ciclopirox (PENLAC) 8 % Soln; Apply topically nightly.  Dispense: 6.6 mL; Refill: 11    2. Hypertension, unspecified type  /82  Follow low sodium diet, < 2 gm/day (avoid high salty foods such as processed meats/ sausage/hsieh/ sandwich meat, chips, pickles, cheese, crackers and soft drinks/ electrolyte replacement drinks).  Avoid tobacco/ alcohol use  Educated on health benefits of at least 5 days/ week of 30 minutes moderate intensity exercise (brisk walking) and 2 or more days/ week of muscle strength activities  Daily ASA 81 mg for CV prevention  Continue current medication regimen    - amLODIPine (NORVASC) 5 MG tablet; Take 1 tablet (5 mg total) by mouth once daily.  Dispense: 90 tablet; Refill: 2    3. Generalized anxiety disorder  Stable.  Continue medications.  Notify provider if worsening condition.  Any SI/HI, report to ER/ call 911.  - hydrOXYzine pamoate (VISTARIL) 25 MG Cap; Take 1 capsule (25 mg total) by mouth 3 (three) times daily as needed (anxiety).  Dispense: 90 capsule; Refill: 6    4. Type 2 diabetes mellitus without complication, without long-term current use of insulin  Labs pending from today; A1c 6.5%  CBGs: 109, 117, 120  Hypoglycemia episodes:  Microalbumin:  Educated on ADA diet: eliminate/decrease high carbohydrate foods (rice, pasta, bread, potatoes (french fries, chips), candy, sweets, fruit juices, canned fruit, junk food, crackers, carbonated beverages)  Educated on health benefits of at least 5 days/ week of 30 minutes moderate intensity exercise (brisk walking) and 2 or more days/ week of muscle strength activities  Avoid alcohol or tobacco  use  Eye exam:  Foot exam: 6/12/23  Patient did not start metformin, we will notify once review results    5. H pylori ulcer  Request Rx refill.  - pantoprazole (PROTONIX) 40 MG tablet; Take 1 tablet (40 mg total) by mouth once daily.  Dispense: 90 tablet; Refill: 2    6. Lumbar degenerative disc disease  Patient is followed by neurosurgeon in [place].  Patient will contact provider.  Rx requested refill.  - ibuprofen (ADVIL,MOTRIN) 800 MG tablet; Take 1 tablet (800 mg total) by mouth 3 (three) times daily as needed for Pain (back pain). Take with food/ milk. Avoid other NSAIDs.  Dispense: 60 tablet; Refill: 2    7. Lumbar spondylosis  See 6  - ibuprofen (ADVIL,MOTRIN) 800 MG tablet; Take 1 tablet (800 mg total) by mouth 3 (three) times daily as needed for Pain (back pain). Take with food/ milk. Avoid other NSAIDs.  Dispense: 60 tablet; Refill: 2    8. Vitamin D deficiency  Vitamin-D 23.6.  Patient completed replacement therapy.  Patient is currently on daily supplement.    9. BMI 31.0-31.9,adult  BMI 31.97  Educated on increased risk of disease s/t obesity.  Educated on health benefits of at least 5 days/ week of 30 minutes moderate intensity exercise (brisk walking) and 2 or more days/ week of muscle strength activities (as tolerated).  Eat well balanced diet of fresh fruits/ vegetables, whole grains, lean meats and limit high carbohydrate foods.         Future Appointments   Date Time Provider Department Center   9/19/2023 12:50 PM NANI Babin [facility]   12/12/2023  8:00 AM Shira Funk NP [facility]        Follow up in about 6 months (around 12/12/2023) for with fasting labs before visit.    Signature:  Shira Funk NP  [facility]  [address] [facility] [address]    Date of encounter: 6/12/23

## 2023-06-12 NOTE — TELEPHONE ENCOUNTER
Please f/u on referral to Dr. Barton ordered 12/12/22. Pt denies receiving call/ appt. Please confirm referral was received and accepted. If so, please inform pt to contact his office and provide update to pt.     Thank you

## 2023-06-12 NOTE — TELEPHONE ENCOUNTER
Please let pt know diabetes level (A1c) did improve to 6.2%. Continue ADA diet and exercise encouraged. Metformin is still recommended if she decides to begin taking.     Remind her to complete fasting labs before her next visit in 6 mo.     Thank you

## 2023-06-14 DIAGNOSIS — B96.81 H PYLORI ULCER: ICD-10-CM

## 2023-06-14 DIAGNOSIS — K27.9 H PYLORI ULCER: ICD-10-CM

## 2023-06-14 DIAGNOSIS — B35.1 ONYCHOMYCOSIS: ICD-10-CM

## 2023-06-14 RX ORDER — PANTOPRAZOLE SODIUM 40 MG/1
40 TABLET, DELAYED RELEASE ORAL DAILY
Qty: 90 TABLET | Refills: 2 | Status: CANCELLED | OUTPATIENT
Start: 2023-06-14

## 2023-06-14 RX ORDER — CICLOPIROX 80 MG/ML
SOLUTION TOPICAL NIGHTLY
Qty: 6.6 ML | Refills: 11 | Status: CANCELLED | OUTPATIENT
Start: 2023-06-14

## 2023-06-14 NOTE — TELEPHONE ENCOUNTER
Patient states she never received the Ciclopriox and Pantoprazole when she picked up her medications.

## 2023-06-14 NOTE — TELEPHONE ENCOUNTER
Please call pharmacy to confirm they were received as it shows it was delivered successfully. If pharmacy has on file, please notify pt to ask for them to be picked up.     Thank you

## 2023-06-15 NOTE — TELEPHONE ENCOUNTER
Patient needed PA completed. Pharmacy reports insurance will only cover 6 months for Pantoprazole will send form. PA was approved for nail solution. Patient informed

## 2023-06-16 ENCOUNTER — PATIENT MESSAGE (OUTPATIENT)
Dept: PODIATRY | Facility: CLINIC | Age: 53
End: 2023-06-16
Payer: MEDICAID

## 2023-07-21 ENCOUNTER — LAB VISIT (OUTPATIENT)
Dept: LAB | Facility: HOSPITAL | Age: 53
End: 2023-07-21
Attending: INTERNAL MEDICINE
Payer: MEDICAID

## 2023-07-21 DIAGNOSIS — Z12.11 SCREENING FOR MALIGNANT NEOPLASM OF COLON: ICD-10-CM

## 2023-07-21 DIAGNOSIS — Z12.9 SCREENING FOR MALIGNANT NEOPLASM: Primary | ICD-10-CM

## 2023-07-21 LAB
HEMOCCULT SP1 STL QL: NEGATIVE
HEMOCCULT SP2 STL QL: NEGATIVE
HEMOCCULT SP3 STL QL: NEGATIVE

## 2023-07-21 PROCEDURE — 82270 OCCULT BLOOD FECES: CPT

## 2023-09-19 ENCOUNTER — OFFICE VISIT (OUTPATIENT)
Dept: GYNECOLOGY | Facility: CLINIC | Age: 53
End: 2023-09-19
Payer: MEDICAID

## 2023-09-19 VITALS
OXYGEN SATURATION: 100 % | WEIGHT: 176 LBS | HEART RATE: 73 BPM | BODY MASS INDEX: 32.39 KG/M2 | TEMPERATURE: 98 F | DIASTOLIC BLOOD PRESSURE: 78 MMHG | SYSTOLIC BLOOD PRESSURE: 119 MMHG | HEIGHT: 62 IN | RESPIRATION RATE: 20 BRPM

## 2023-09-19 DIAGNOSIS — Z12.4 PAP SMEAR FOR CERVICAL CANCER SCREENING: Primary | ICD-10-CM

## 2023-09-19 PROCEDURE — 3074F SYST BP LT 130 MM HG: CPT | Mod: CPTII,,, | Performed by: NURSE PRACTITIONER

## 2023-09-19 PROCEDURE — 3078F PR MOST RECENT DIASTOLIC BLOOD PRESSURE < 80 MM HG: ICD-10-PCS | Mod: CPTII,,, | Performed by: NURSE PRACTITIONER

## 2023-09-19 PROCEDURE — 3008F PR BODY MASS INDEX (BMI) DOCUMENTED: ICD-10-PCS | Mod: CPTII,,, | Performed by: NURSE PRACTITIONER

## 2023-09-19 PROCEDURE — 1159F MED LIST DOCD IN RCRD: CPT | Mod: CPTII,,, | Performed by: NURSE PRACTITIONER

## 2023-09-19 PROCEDURE — 1159F PR MEDICATION LIST DOCUMENTED IN MEDICAL RECORD: ICD-10-PCS | Mod: CPTII,,, | Performed by: NURSE PRACTITIONER

## 2023-09-19 PROCEDURE — 99386 PREV VISIT NEW AGE 40-64: CPT | Mod: S$PBB,,, | Performed by: NURSE PRACTITIONER

## 2023-09-19 PROCEDURE — 3074F PR MOST RECENT SYSTOLIC BLOOD PRESSURE < 130 MM HG: ICD-10-PCS | Mod: CPTII,,, | Performed by: NURSE PRACTITIONER

## 2023-09-19 PROCEDURE — 3078F DIAST BP <80 MM HG: CPT | Mod: CPTII,,, | Performed by: NURSE PRACTITIONER

## 2023-09-19 PROCEDURE — 88174 CYTOPATH C/V AUTO IN FLUID: CPT | Performed by: NURSE PRACTITIONER

## 2023-09-19 PROCEDURE — 3044F HG A1C LEVEL LT 7.0%: CPT | Mod: CPTII,,, | Performed by: NURSE PRACTITIONER

## 2023-09-19 PROCEDURE — 87624 HPV HI-RISK TYP POOLED RSLT: CPT

## 2023-09-19 PROCEDURE — 3044F PR MOST RECENT HEMOGLOBIN A1C LEVEL <7.0%: ICD-10-PCS | Mod: CPTII,,, | Performed by: NURSE PRACTITIONER

## 2023-09-19 PROCEDURE — 3008F BODY MASS INDEX DOCD: CPT | Mod: CPTII,,, | Performed by: NURSE PRACTITIONER

## 2023-09-19 PROCEDURE — 99214 OFFICE O/P EST MOD 30 MIN: CPT | Mod: PBBFAC | Performed by: NURSE PRACTITIONER

## 2023-09-19 PROCEDURE — 99386 PR PREVENTIVE VISIT,NEW,40-64: ICD-10-PCS | Mod: S$PBB,,, | Performed by: NURSE PRACTITIONER

## 2023-09-19 PROCEDURE — 87661 TRICHOMONAS VAGINALIS AMPLIF: CPT

## 2023-09-19 NOTE — PROGRESS NOTES
"  Subjective:       Patient ID: Tova Stack is a 53 y.o. female.    Chief Complaint:  Gynecologic Exam      History of Present Illness  The patient  here for annual exam. Pt is postmenopausal since 2022. Hx of fibroids. Admits history of abnormal pap with colpo in the past. Last pap -NIL and HPV neg.  MG-23 & BIRADS 1. Denies breast or urinary complaints. Denies pelvic pain, abnormal bleeding or discharge. Pt reports Hep B in the past and no concerns today. Denies tobacco use. Dep. screening 0. Colonoscopy in . Denies fly hx of breast, ovarian, uterine or colon cancer.     GYN & OB History  No LMP recorded (lmp unknown). Patient is postmenopausal.   Date of Last Pap: 2019    Review of patient's allergies indicates:  No Known Allergies  Past Medical History:   Diagnosis Date    Abnormal Pap smear of cervix     Anemia     GERD (gastroesophageal reflux disease)     Hypertension     Personal history of colonic polyps 2022     OB History    Para Term  AB Living   6 4           SAB IAB Ectopic Multiple Live Births                  # Outcome Date GA Lbr Ramiro/2nd Weight Sex Delivery Anes PTL Lv   6             5             4 Para            3 Para            2 Para            1 Para                 Review of Systems  Review of Systems    Negative except for pertinent findings for positives per HPI     Objective:    Physical Exam    /78 (BP Location: Left arm, Patient Position: Sitting, BP Method: Medium (Automatic))   Pulse 73   Temp 98.4 °F (36.9 °C) (Oral)   Resp 20   Ht 5' 2" (1.575 m)   Wt 79.8 kg (176 lb)   LMP  (LMP Unknown)   SpO2 100%   BMI 32.19 kg/m²   GENERAL: Well-developed female in no acute distress.  SKIN: Normal to inspection, warm and intact.  BREASTS: No masses, lumps, discharge, tenderness.  VULVA: General appearance normal; external genitalia with no lesions or erythema.  VAGINA: Mucosa atrophic, no abnormal discharge or " lesions.  CERVIX: Atrophic, no abnormal discharge.  BIMANUAL EXAM: reveals a 10 week-sized uterus. The uterus is non tender. Cesar adnexa reveal no tenderness.  PSYCHIATRIC: Patient is oriented to person, place, and time. Mood and affect are normal.    Assessment:       1. Pap smear for cervical cancer screening       Plan:   Tova was seen today for gynecologic exam.    Diagnoses and all orders for this visit:    Pap smear for cervical cancer screening  -     Liquid-Based Pap Smear, Screening Screening  -     Liquid-Based Pap Smear, Screening Screening    Pap today  Call with any vaginal bleeding which would be abnormal.  Follow up in about 1 year (around 9/19/2024) for annual exam.

## 2023-09-21 LAB — PSYCHE PATHOLOGY RESULT: NORMAL

## 2023-11-09 ENCOUNTER — OFFICE VISIT (OUTPATIENT)
Dept: URGENT CARE | Facility: CLINIC | Age: 53
End: 2023-11-09
Payer: MEDICAID

## 2023-11-09 VITALS
BODY MASS INDEX: 32.73 KG/M2 | OXYGEN SATURATION: 100 % | DIASTOLIC BLOOD PRESSURE: 85 MMHG | HEART RATE: 80 BPM | SYSTOLIC BLOOD PRESSURE: 135 MMHG | WEIGHT: 177.88 LBS | TEMPERATURE: 98 F | HEIGHT: 62 IN | RESPIRATION RATE: 16 BRPM

## 2023-11-09 DIAGNOSIS — M79.602 PAIN OF LEFT UPPER EXTREMITY: Primary | ICD-10-CM

## 2023-11-09 PROCEDURE — 99213 PR OFFICE/OUTPT VISIT, EST, LEVL III, 20-29 MIN: ICD-10-PCS | Mod: S$PBB,,,

## 2023-11-09 PROCEDURE — 99213 OFFICE O/P EST LOW 20 MIN: CPT | Mod: S$PBB,,,

## 2023-11-09 PROCEDURE — 99215 OFFICE O/P EST HI 40 MIN: CPT | Mod: PBBFAC

## 2023-11-09 RX ORDER — DICLOFENAC SODIUM 10 MG/G
2 GEL TOPICAL 4 TIMES DAILY
Qty: 20 G | Refills: 0 | Status: SHIPPED | OUTPATIENT
Start: 2023-11-09

## 2023-11-09 NOTE — LETTER
November 9, 2023      [facility]  P[facility] [facility] [address]  Phone: [phone]       Patient: Tova Stack   YOB: 1970  Date of Visit: 11/09/2023    To Whom It May Concern:    Evens Stack  was at [facility] on 11/09/2023. The patient may return to work/school on NOV 11 2023 with no restrictions. If you have any questions or concerns, or if I can be of further assistance, please do not hesitate to contact me.    Sincerely,    CHINA URBINA NP

## 2023-11-10 NOTE — PROGRESS NOTES
"Subjective:      Patient ID: Tova Stack is a 53 y.o. female.    Vitals:  height is 5' 2" (1.575 m) and weight is 80.7 kg (177 lb 14.4 oz). Her temperature is 98.2 °F (36.8 °C). Her blood pressure is 135/85 and her pulse is 80. Her respiration is 16 and oxygen saturation is 100%.     Chief Complaint: Numbness (Numbness/tingling to Lt hand since 1755. States scanner at store "shocked" her.)    Pt states numbness and tingling in L arm after a barcode scanner scanned her receipt and shocked her. Denies other symptoms.        Constitution: Negative.   HENT: Negative.     Neck: neck negative.   Cardiovascular: Negative.    Eyes: Negative.    Respiratory: Negative.     Gastrointestinal: Negative.    Endocrine: negative.   Genitourinary: Negative.    Musculoskeletal: Negative.    Skin: Negative.    Neurological:  Positive for numbness and tingling.      Objective:     Physical Exam   Constitutional: She is oriented to person, place, and time. normal  HENT:   Head: Normocephalic.   Eyes: Pupils are equal, round, and reactive to light.   Cardiovascular: Normal rate and normal pulses.   Pulmonary/Chest: Effort normal.   Abdominal: Normal appearance. Soft.   Musculoskeletal: Normal range of motion.         General: Normal range of motion.   Neurological: no focal deficit. She is alert and oriented to person, place, and time. She has normal motor skills, normal sensation and intact cranial nerves (2-12). No sensory deficit. Gait and coordination normal.   Skin: Skin is warm and dry.   Vitals reviewed.      Assessment:     1. Pain of left upper extremity        Plan:       Pain of left upper extremity  -     diclofenac sodium (VOLTAREN) 1 % Gel; Apply 2 g topically 4 (four) times daily.  Dispense: 20 g; Refill: 0      ER precautions given, patient verbalized understanding.     Please see provided patient education for guidance.    Follow up with PCP or return to clinic if symptoms worsen or do not improve.                  "

## 2023-11-12 ENCOUNTER — HOSPITAL ENCOUNTER (EMERGENCY)
Facility: HOSPITAL | Age: 53
Discharge: HOME | End: 2023-11-12
Attending: EMERGENCY MEDICINE
Payer: MEDICAID

## 2023-11-12 VITALS
OXYGEN SATURATION: 100 % | HEIGHT: 62 IN | BODY MASS INDEX: 32.74 KG/M2 | RESPIRATION RATE: 20 BRPM | TEMPERATURE: 97 F | HEART RATE: 79 BPM | SYSTOLIC BLOOD PRESSURE: 108 MMHG | WEIGHT: 177.94 LBS | DIASTOLIC BLOOD PRESSURE: 72 MMHG

## 2023-11-12 DIAGNOSIS — M79.602 LEFT ARM PAIN: Primary | ICD-10-CM

## 2023-11-12 DIAGNOSIS — R20.2 PARESTHESIA: ICD-10-CM

## 2023-11-12 PROCEDURE — 99283 EMERGENCY DEPT VISIT LOW MDM: CPT

## 2023-11-12 RX ORDER — GABAPENTIN 100 MG/1
100 CAPSULE ORAL 3 TIMES DAILY
Qty: 90 CAPSULE | Refills: 0 | Status: SHIPPED | OUTPATIENT
Start: 2023-11-12 | End: 2023-12-20

## 2023-11-12 NOTE — ED PROVIDER NOTES
Encounter Date: 2023       History     Chief Complaint   Patient presents with    Arm Pain     C/o electrical shock to left arm on Friday. Stated went to urgent care but continues to have pain in wrist area and hand.      Patient reports to the emergency room with a shocking pain in left arm afterafter a barcode scanner scanned her receipt and shocked her earlier in the week while at the store; patient denies any pain above her elbow and reports the tingling sensation only occurs in her hands and wrist; patient denies chest pain or shortness of breath     The history is provided by the patient.   Arm Pain  This is a recurrent problem. The current episode started more than 2 days ago. The problem occurs constantly. The problem has not changed since onset.Pertinent negatives include no chest pain, no abdominal pain, no headaches and no shortness of breath.     Review of patient's allergies indicates:  No Known Allergies  Past Medical History:   Diagnosis Date    Abnormal Pap smear of cervix     Anemia     GERD (gastroesophageal reflux disease)     Hypertension     Personal history of colonic polyps 2022     Past Surgical History:   Procedure Laterality Date     SECTION      4 total    COLONOSCOPY  2022    TUBAL LIGATION       Family History   Problem Relation Age of Onset    Hypertension Mother     Diabetes Mother     Hypertension Father     Heart disease Sister     Hypertension Sister     Stroke Sister     Cancer Maternal Grandmother     Stomach cancer Maternal Aunt     Colon cancer Maternal Uncle      Social History     Tobacco Use    Smoking status: Former     Current packs/day: 0.00     Average packs/day: 1 pack/day for 5.0 years (5.0 ttl pk-yrs)     Types: Cigarettes     Start date:      Quit date: 2019     Years since quittin.8    Smokeless tobacco: Never   Substance Use Topics    Alcohol use: Never    Drug use: Not Currently     Types: Cocaine     Comment:      Review of  Systems   Constitutional:  Negative for fever.   HENT:  Negative for sore throat.    Eyes: Negative.    Respiratory:  Negative for shortness of breath.    Cardiovascular:  Negative for chest pain.   Gastrointestinal:  Negative for abdominal pain and nausea.   Genitourinary:  Negative for dysuria.   Musculoskeletal:  Negative for back pain.   Skin:  Negative for rash.   Neurological:  Negative for weakness and headaches.   Hematological:  Does not bruise/bleed easily.   Psychiatric/Behavioral: Negative.         Physical Exam     Initial Vitals [11/12/23 0731]   BP Pulse Resp Temp SpO2   108/72 79 20 97.3 °F (36.3 °C) 100 %      MAP       --         Physical Exam    Vitals reviewed.  Constitutional: She appears well-developed and well-nourished.   HENT:   Head: Normocephalic and atraumatic.   Eyes: Conjunctivae and EOM are normal. Pupils are equal, round, and reactive to light.   Neck: Neck supple.   Normal range of motion.  Cardiovascular:  Normal rate, regular rhythm, normal heart sounds and intact distal pulses.           Pulmonary/Chest: Breath sounds normal. No respiratory distress. She has no wheezes. She exhibits no tenderness.   Abdominal: Abdomen is soft. Bowel sounds are normal. There is no abdominal tenderness.   Musculoskeletal:         General: Normal range of motion.      Right forearm: No swelling, edema, deformity or tenderness.      Left forearm: Normal. No swelling, edema, deformity or tenderness.      Right wrist: Normal. No swelling, deformity or tenderness. Normal range of motion. Normal pulse.      Left wrist: Normal. No swelling, deformity or tenderness. Normal range of motion. Normal pulse.      Right hand: Normal. No swelling, deformity or tenderness. Normal range of motion. Normal strength. Normal sensation. Normal capillary refill. Normal pulse.      Left hand: Normal. No swelling, deformity or tenderness. Normal range of motion. Normal strength. Normal sensation. Normal capillary refill.  Normal pulse.      Cervical back: Normal range of motion and neck supple.      Comments: No abnormalities noted to the bilateral hands, wrists, forearms to suggest burn, bony abnormality, or infectious component     Neurological: She is alert and oriented to person, place, and time. She displays normal reflexes. No cranial nerve deficit or sensory deficit.   Skin: Skin is warm and dry.   Psychiatric: She has a normal mood and affect. Her behavior is normal. Judgment and thought content normal.         ED Course   Procedures  Labs Reviewed - No data to display       Imaging Results    None          Medications - No data to display  Medical Decision Making  Discussed at length with patient that her complaint appears to be nerve related however given her age and comorbidities I offered routine labs including cardiac and CK CK-MB markers however the patient declined to have that done at this time; discussed at length with patient that given her symptom complaint a nerve conduction study would likely be beneficial and she would have to discuss this with her PCP, which she states she will do tomorrow or Tuesday if her symptoms do not improve or worsen; patient understands that today's diagnosis is simply based on HPI and physical exam in his limited by lack of any diagnostic testing    Risk  Risk Details: Given strict ED return precautions. I have spoken with the patient and/or caregivers. I have explained the patient's condition, diagnoses and treatment plan based on the information available to me at this time. I have answered the patient's and/or caregiver's questions and addressed any concerns. The patient and/or caregivers have as good an understanding of the patient's diagnosis, condition and treatment plan as can be expected at this point. The vital signs have been stable. The patient's condition is stable and appropriate for discharge from the emergency department.      The patient will pursue further outpatient  evaluation with the primary care physician or other designated or consulting physician as outlined in the discharge instructions. The patient and/or caregivers are agreeable to this plan of care and follow-up instructions have been explained in detail. The patient and/or caregivers have received these instructions in written format and have expressed an understanding of the discharge instructions. The patient and/or caregivers are aware that any significant change in condition or worsening of symptoms should prompt an immediate return to this or the closest emergency department or a call to 911.                               Clinical Impression:   Final diagnoses:  [M79.602] Left arm pain (Primary)  [R20.2] Paresthesia        ED Disposition Condition    Discharge Stable          ED Prescriptions       Medication Sig Dispense Start Date End Date Auth. Provider    gabapentin (NEURONTIN) 100 MG capsule Take 1 capsule (100 mg total) by mouth 3 (three) times daily. 90 capsule 11/12/2023 12/12/2023 Mario Briscoe PA          Follow-up Information       Follow up With Specialties Details Why Contact Info    discharge followup    If your symptoms become WORSE or you DO NOT IMPROVE and you are unable to reach your health care provider, you should RETURN to the emergency department    Good, Shira L, NP Nurse Practitioner   [address] [id number] [phone]      discharge info    Discussed all pertinent ED information, results, diagnosis and treatment plan; All questions and concerns were addressed at this time. Patient voices understanding of information and instructions. Patient is comfortable with plan and discharge             Mario Briscoe PA  11/12/23 5745

## 2023-11-12 NOTE — Clinical Note
"Tova Stack (Roberta) was seen and treated in our emergency department on 11/12/2023.  She may return to work on 11/14/2023.       If you have any questions or concerns, please don't hesitate to call.       RN    "

## 2023-11-17 DIAGNOSIS — E11.9 TYPE 2 DIABETES MELLITUS WITHOUT COMPLICATION, WITHOUT LONG-TERM CURRENT USE OF INSULIN: ICD-10-CM

## 2023-11-28 RX ORDER — METFORMIN HYDROCHLORIDE 500 MG/1
500 TABLET ORAL
Qty: 30 TABLET | Refills: 0 | Status: SHIPPED | OUTPATIENT
Start: 2023-11-28 | End: 2023-12-12 | Stop reason: SDUPTHER

## 2023-12-11 ENCOUNTER — LAB VISIT (OUTPATIENT)
Dept: LAB | Facility: HOSPITAL | Age: 53
End: 2023-12-11
Attending: NURSE PRACTITIONER
Payer: MEDICAID

## 2023-12-11 DIAGNOSIS — E55.9 VITAMIN D DEFICIENCY: ICD-10-CM

## 2023-12-11 DIAGNOSIS — I10 HYPERTENSION, UNSPECIFIED TYPE: ICD-10-CM

## 2023-12-11 DIAGNOSIS — E11.9 TYPE 2 DIABETES MELLITUS WITHOUT COMPLICATION, WITHOUT LONG-TERM CURRENT USE OF INSULIN: ICD-10-CM

## 2023-12-11 LAB
ALBUMIN SERPL-MCNC: 3.9 G/DL (ref 3.5–5)
ALBUMIN/GLOB SERPL: 1 RATIO (ref 1.1–2)
ALP SERPL-CCNC: 82 UNIT/L (ref 40–150)
ALT SERPL-CCNC: 12 UNIT/L (ref 0–55)
AST SERPL-CCNC: 16 UNIT/L (ref 5–34)
BASOPHILS # BLD AUTO: 0.04 X10(3)/MCL
BASOPHILS NFR BLD AUTO: 0.7 %
BILIRUB SERPL-MCNC: 0.4 MG/DL
BUN SERPL-MCNC: 13.9 MG/DL (ref 9.8–20.1)
CALCIUM SERPL-MCNC: 10.2 MG/DL (ref 8.4–10.2)
CHLORIDE SERPL-SCNC: 106 MMOL/L (ref 98–107)
CHOLEST SERPL-MCNC: 177 MG/DL
CHOLEST/HDLC SERPL: 4 {RATIO} (ref 0–5)
CO2 SERPL-SCNC: 29 MMOL/L (ref 22–29)
CREAT SERPL-MCNC: 0.8 MG/DL (ref 0.55–1.02)
CREAT UR-MCNC: 75.9 MG/DL (ref 45–106)
DEPRECATED CALCIDIOL+CALCIFEROL SERPL-MC: 34 NG/ML (ref 30–80)
EOSINOPHIL # BLD AUTO: 0.18 X10(3)/MCL (ref 0–0.9)
EOSINOPHIL NFR BLD AUTO: 3.3 %
ERYTHROCYTE [DISTWIDTH] IN BLOOD BY AUTOMATED COUNT: 14.3 % (ref 11.5–17)
EST. AVERAGE GLUCOSE BLD GHB EST-MCNC: 131.2 MG/DL
GFR SERPLBLD CREATININE-BSD FMLA CKD-EPI: >60 MLS/MIN/1.73/M2
GLOBULIN SER-MCNC: 4.1 GM/DL (ref 2.4–3.5)
GLUCOSE SERPL-MCNC: 105 MG/DL (ref 74–100)
HBA1C MFR BLD: 6.2 %
HCT VFR BLD AUTO: 40.1 % (ref 37–47)
HDLC SERPL-MCNC: 46 MG/DL (ref 35–60)
HGB BLD-MCNC: 12.6 G/DL (ref 12–16)
IMM GRANULOCYTES # BLD AUTO: 0.01 X10(3)/MCL (ref 0–0.04)
IMM GRANULOCYTES NFR BLD AUTO: 0.2 %
LDLC SERPL CALC-MCNC: 105 MG/DL (ref 50–140)
LYMPHOCYTES # BLD AUTO: 2.51 X10(3)/MCL (ref 0.6–4.6)
LYMPHOCYTES NFR BLD AUTO: 45.5 %
MCH RBC QN AUTO: 25.6 PG (ref 27–31)
MCHC RBC AUTO-ENTMCNC: 31.4 G/DL (ref 33–36)
MCV RBC AUTO: 81.5 FL (ref 80–94)
MICROALBUMIN UR-MCNC: <5 UG/ML
MICROALBUMIN/CREAT RATIO PNL UR: NORMAL
MONOCYTES # BLD AUTO: 0.62 X10(3)/MCL (ref 0.1–1.3)
MONOCYTES NFR BLD AUTO: 11.2 %
NEUTROPHILS # BLD AUTO: 2.16 X10(3)/MCL (ref 2.1–9.2)
NEUTROPHILS NFR BLD AUTO: 39.1 %
NRBC BLD AUTO-RTO: 0 %
PLATELET # BLD AUTO: 379 X10(3)/MCL (ref 130–400)
PMV BLD AUTO: 9.4 FL (ref 7.4–10.4)
POTASSIUM SERPL-SCNC: 3.9 MMOL/L (ref 3.5–5.1)
PROT SERPL-MCNC: 8 GM/DL (ref 6.4–8.3)
RBC # BLD AUTO: 4.92 X10(6)/MCL (ref 4.2–5.4)
SODIUM SERPL-SCNC: 140 MMOL/L (ref 136–145)
TRIGL SERPL-MCNC: 129 MG/DL (ref 37–140)
TSH SERPL-ACNC: 3.86 UIU/ML (ref 0.35–4.94)
VLDLC SERPL CALC-MCNC: 26 MG/DL
WBC # SPEC AUTO: 5.52 X10(3)/MCL (ref 4.5–11.5)

## 2023-12-11 PROCEDURE — 82043 UR ALBUMIN QUANTITATIVE: CPT

## 2023-12-11 PROCEDURE — 85025 COMPLETE CBC W/AUTO DIFF WBC: CPT

## 2023-12-11 PROCEDURE — 83036 HEMOGLOBIN GLYCOSYLATED A1C: CPT

## 2023-12-11 PROCEDURE — 84443 ASSAY THYROID STIM HORMONE: CPT

## 2023-12-11 PROCEDURE — 80053 COMPREHEN METABOLIC PANEL: CPT

## 2023-12-11 PROCEDURE — 80061 LIPID PANEL: CPT

## 2023-12-11 PROCEDURE — 82306 VITAMIN D 25 HYDROXY: CPT

## 2023-12-11 PROCEDURE — 36415 COLL VENOUS BLD VENIPUNCTURE: CPT

## 2023-12-12 ENCOUNTER — TELEPHONE (OUTPATIENT)
Dept: INTERNAL MEDICINE | Facility: CLINIC | Age: 53
End: 2023-12-12

## 2023-12-12 ENCOUNTER — OFFICE VISIT (OUTPATIENT)
Dept: INTERNAL MEDICINE | Facility: CLINIC | Age: 53
End: 2023-12-12
Payer: MEDICAID

## 2023-12-12 ENCOUNTER — CLINICAL SUPPORT (OUTPATIENT)
Dept: INTERNAL MEDICINE | Facility: CLINIC | Age: 53
End: 2023-12-12
Attending: NURSE PRACTITIONER
Payer: MEDICAID

## 2023-12-12 VITALS
DIASTOLIC BLOOD PRESSURE: 75 MMHG | HEIGHT: 62 IN | TEMPERATURE: 98 F | WEIGHT: 177.38 LBS | SYSTOLIC BLOOD PRESSURE: 120 MMHG | HEART RATE: 82 BPM | RESPIRATION RATE: 16 BRPM | BODY MASS INDEX: 32.64 KG/M2

## 2023-12-12 DIAGNOSIS — E78.5 DYSLIPIDEMIA, GOAL LDL BELOW 70: ICD-10-CM

## 2023-12-12 DIAGNOSIS — F41.1 GENERALIZED ANXIETY DISORDER: ICD-10-CM

## 2023-12-12 DIAGNOSIS — M16.0 PRIMARY OSTEOARTHRITIS OF BOTH HIPS: ICD-10-CM

## 2023-12-12 DIAGNOSIS — H43.399 VITREOUS FLOATERS, UNSPECIFIED LATERALITY: ICD-10-CM

## 2023-12-12 DIAGNOSIS — E11.9 TYPE 2 DIABETES MELLITUS WITHOUT COMPLICATION, WITHOUT LONG-TERM CURRENT USE OF INSULIN: ICD-10-CM

## 2023-12-12 DIAGNOSIS — M51.36 LUMBAR DEGENERATIVE DISC DISEASE: Primary | ICD-10-CM

## 2023-12-12 DIAGNOSIS — M47.816 LUMBAR SPONDYLOSIS: ICD-10-CM

## 2023-12-12 DIAGNOSIS — I10 PRIMARY HYPERTENSION: ICD-10-CM

## 2023-12-12 DIAGNOSIS — I10 HYPERTENSION, UNSPECIFIED TYPE: ICD-10-CM

## 2023-12-12 LAB
LEFT EYE DM RETINOPATHY: NEGATIVE
RIGHT EYE DM RETINOPATHY: NEGATIVE

## 2023-12-12 PROCEDURE — 3074F SYST BP LT 130 MM HG: CPT | Mod: CPTII,,, | Performed by: NURSE PRACTITIONER

## 2023-12-12 PROCEDURE — 99215 OFFICE O/P EST HI 40 MIN: CPT | Mod: PBBFAC | Performed by: NURSE PRACTITIONER

## 2023-12-12 PROCEDURE — 3008F BODY MASS INDEX DOCD: CPT | Mod: CPTII,,, | Performed by: NURSE PRACTITIONER

## 2023-12-12 PROCEDURE — 3066F PR DOCUMENTATION OF TREATMENT FOR NEPHROPATHY: ICD-10-PCS | Mod: CPTII,,, | Performed by: NURSE PRACTITIONER

## 2023-12-12 PROCEDURE — 1159F MED LIST DOCD IN RCRD: CPT | Mod: CPTII,,, | Performed by: NURSE PRACTITIONER

## 2023-12-12 PROCEDURE — 1159F PR MEDICATION LIST DOCUMENTED IN MEDICAL RECORD: ICD-10-PCS | Mod: CPTII,,, | Performed by: NURSE PRACTITIONER

## 2023-12-12 PROCEDURE — 3078F DIAST BP <80 MM HG: CPT | Mod: CPTII,,, | Performed by: NURSE PRACTITIONER

## 2023-12-12 PROCEDURE — 3078F PR MOST RECENT DIASTOLIC BLOOD PRESSURE < 80 MM HG: ICD-10-PCS | Mod: CPTII,,, | Performed by: NURSE PRACTITIONER

## 2023-12-12 PROCEDURE — 92228 IMG RTA DETC/MNTR DS PHY/QHP: CPT | Mod: PBBFAC

## 2023-12-12 PROCEDURE — 3044F HG A1C LEVEL LT 7.0%: CPT | Mod: CPTII,,, | Performed by: NURSE PRACTITIONER

## 2023-12-12 PROCEDURE — 1160F PR REVIEW ALL MEDS BY PRESCRIBER/CLIN PHARMACIST DOCUMENTED: ICD-10-PCS | Mod: CPTII,,, | Performed by: NURSE PRACTITIONER

## 2023-12-12 PROCEDURE — 3066F NEPHROPATHY DOC TX: CPT | Mod: CPTII,,, | Performed by: NURSE PRACTITIONER

## 2023-12-12 PROCEDURE — 3044F PR MOST RECENT HEMOGLOBIN A1C LEVEL <7.0%: ICD-10-PCS | Mod: CPTII,,, | Performed by: NURSE PRACTITIONER

## 2023-12-12 PROCEDURE — 3008F PR BODY MASS INDEX (BMI) DOCUMENTED: ICD-10-PCS | Mod: CPTII,,, | Performed by: NURSE PRACTITIONER

## 2023-12-12 PROCEDURE — 99214 OFFICE O/P EST MOD 30 MIN: CPT | Mod: S$PBB,,, | Performed by: NURSE PRACTITIONER

## 2023-12-12 PROCEDURE — 1160F RVW MEDS BY RX/DR IN RCRD: CPT | Mod: CPTII,,, | Performed by: NURSE PRACTITIONER

## 2023-12-12 PROCEDURE — 3074F PR MOST RECENT SYSTOLIC BLOOD PRESSURE < 130 MM HG: ICD-10-PCS | Mod: CPTII,,, | Performed by: NURSE PRACTITIONER

## 2023-12-12 PROCEDURE — 3061F PR NEG MICROALBUMINURIA RESULT DOCUMENTED/REVIEW: ICD-10-PCS | Mod: CPTII,,, | Performed by: NURSE PRACTITIONER

## 2023-12-12 PROCEDURE — 99214 PR OFFICE/OUTPT VISIT, EST, LEVL IV, 30-39 MIN: ICD-10-PCS | Mod: S$PBB,,, | Performed by: NURSE PRACTITIONER

## 2023-12-12 PROCEDURE — 3061F NEG MICROALBUMINURIA REV: CPT | Mod: CPTII,,, | Performed by: NURSE PRACTITIONER

## 2023-12-12 RX ORDER — HYDROXYZINE PAMOATE 25 MG/1
25 CAPSULE ORAL 3 TIMES DAILY PRN
Qty: 90 CAPSULE | Refills: 5 | Status: SHIPPED | OUTPATIENT
Start: 2023-12-12

## 2023-12-12 RX ORDER — AMLODIPINE BESYLATE 5 MG/1
5 TABLET ORAL DAILY
Qty: 90 TABLET | Refills: 2 | Status: SHIPPED | OUTPATIENT
Start: 2023-12-12

## 2023-12-12 RX ORDER — METFORMIN HYDROCHLORIDE 500 MG/1
500 TABLET ORAL
Qty: 90 TABLET | Refills: 2 | Status: SHIPPED | OUTPATIENT
Start: 2023-12-12

## 2023-12-12 RX ORDER — FERROUS SULFATE 325(65) MG
325 TABLET, DELAYED RELEASE (ENTERIC COATED) ORAL
Qty: 39 TABLET | Refills: 3 | Status: SHIPPED | OUTPATIENT
Start: 2023-12-13

## 2023-12-12 NOTE — ASSESSMENT & PLAN NOTE
BP Readings from Last 3 Encounters:   12/12/23 120/75   11/12/23 108/72   11/09/23 135/85     Follow low sodium diet, < 2 gm/day (avoid high salty foods such as processed meats/ sausage/hsieh/ sandwich meat, chips, pickles, cheese, crackers and soft drinks/ electrolyte replacement drinks).  Avoid tobacco/ alcohol use  Educated on health benefits of at least 5 days/ week of 30 minutes moderate intensity exercise (brisk walking) and 2 or more days/ week of muscle strength activities  Daily ASA 81 mg for CV prevention  Continue current medication regimen

## 2023-12-12 NOTE — ASSESSMENT & PLAN NOTE
A1c improved to 6.2%  Hypoglycemia episodes: denies  Microalbumin: see comments  Educated on ADA diet: eliminate/decrease high carbohydrate foods (rice, pasta, bread, potatoes (french fries, chips), candy, sweets, fruit juices, canned fruit, junk food, crackers, carbonated beverages)  Educated on health benefits of at least 5 days/ week of 30 minutes moderate intensity exercise (brisk walking) and 2 or more days/ week of muscle strength activities  Avoid alcohol or tobacco use  Eye exam: fundus photos today 12/12/23 completed  Foot exam: 12/12/23  Okay for patient to continue with Metformin 0.5 tablet BID with meals

## 2023-12-12 NOTE — PROGRESS NOTES
Tova Stack is a 53 y.o. female here for a diabetic eye screening with non-dilated fundus photos per MANI Rdz.    Patient cooperative?: Yes  Small pupils?: No  Last eye exam: unknown    For exam results, see Encounter Report.

## 2023-12-12 NOTE — ASSESSMENT & PLAN NOTE
Pt continues with chronic low back pain. Encouraged to f/u with Dr. Cid and to call office for an appt. Again ordered referral to Dr. Barton for pain management and pt may also contact insurance for accepting providers. She verb understanding.

## 2023-12-12 NOTE — TELEPHONE ENCOUNTER
Called and spoke to pt. Informed pt eye photos did not show any retinopathy. Also, informed pt. referral was sent to eye clinic, and provided clinic's telephone number. Patient verbalized understanding.

## 2023-12-12 NOTE — PROGRESS NOTES
Shira L Good, NP   [facility]  [address] [facility] [address]      PATIENT NAME: Tova Stack  : 1970  DATE: 23  MRN: [id number]        Reason for Visit / Chief Complaint: Results and Back Pain       History of Present Illness / Problem Focused Workflow     Tova Stack presents to the clinic with Results and Back Pain     52 yo AAF for 6 mo follow up/ lab results. PMH tobacco abuse. Active diagnosis include HTN, DM, anemia, lumbago, lumbar DDD, OA hip, anxiety. C/o continued low back pain. She states she never received any further follow ups from providers (neurosurgery/ pain mgmt) since our last visit. Denies calling to f/u on this. Fell a few weeks ago because hip locked up on her. Fell onto knee. Denies any injuries. States feels her back is worsening, more constant. Also c/o seeing spots. Otherwise denies any other change in her condition. /75. Labs completed and reviewed with pt. A1c 6.2%. Reports taking 0.5 tablet BID of Metformin; feels like if she takes 1 full tablet at once will be too strong. Reports having loose stool within 30 min of initial dose, otherwise, tolerating without s/e. LDL above goal 105. Not interested in statin. Wants to change diet. Unaware of red meat affecting levels. No other concerns stated.    Other providers:  Dr. Cid- Neurosurgery at [place]. Previously engaged in PT.   Dr. Fischer- GI  [facility] GYN  Dr. Barton- referred x 2 for pain mgmt         Review of Systems     Review of Systems   Constitutional: Negative.    HENT: Negative.     Eyes: Negative.    Respiratory: Negative.     Cardiovascular: Negative.    Gastrointestinal: Negative.    Endocrine: Negative.    Genitourinary: Negative.    Musculoskeletal:  Positive for back pain.   Skin: Negative.    Allergic/Immunologic: Negative.    Neurological: Negative.    Hematological: Negative.    Psychiatric/Behavioral: Negative.          Medical / Social / Family History     ----------------------------  Abnormal Pap smear of cervix  Anemia  Dyslipidemia, goal LDL below 70  GERD (gastroesophageal reflux disease)  Hypertension  Personal history of colonic polyps     Past Surgical History:   Procedure Laterality Date     SECTION      4 total    COLONOSCOPY  2022    TUBAL LIGATION         Social History     Socioeconomic History    Marital status: Single   Tobacco Use    Smoking status: Former     Current packs/day: 0.00     Average packs/day: 1 pack/day for 5.0 years (5.0 ttl pk-yrs)     Types: Cigarettes     Start date:      Quit date:      Years since quittin.9    Smokeless tobacco: Never   Substance and Sexual Activity    Alcohol use: Never    Drug use: Not Currently     Types: Cocaine     Comment:     Sexual activity: Yes     Partners: Male     Social Determinants of Health     Physical Activity: Inactive (2023)    Exercise Vital Sign     Days of Exercise per Week: 0 days     Minutes of Exercise per Session: 0 min        Family History   Problem Relation Age of Onset    Hypertension Mother     Diabetes Mother     Hypertension Father     Heart disease Sister     Hypertension Sister     Stroke Sister     Cancer Maternal Grandmother     Stomach cancer Maternal Aunt     Colon cancer Maternal Uncle         Medications and Allergies     Medications  Current Outpatient Medications   Medication Instructions    amLODIPine (NORVASC) 5 mg, Oral, Daily    blood sugar diagnostic Strp To check BG once daily, to use with insurance preferred meter    blood-glucose meter kit To check BG once daily, to use with insurance preferred meter    cholecalciferol (vitamin D3) 50,000 Units, Oral, Every 7 days    ciclopirox (PENLAC) 8 % Soln Topical (Top), Nightly    diclofenac sodium (VOLTAREN) 2 g, Topical (Top), 4 times daily    famotidine (PEPCID) 40 mg, Oral    [START ON 2023] ferrous sulfate 325 mg, Oral, Every Mon, Wed,  "Fri    gabapentin (NEURONTIN) 100 mg, Oral, 3 times daily    hydrOXYzine pamoate (VISTARIL) 25 mg, Oral, 3 times daily PRN    ibuprofen (ADVIL,MOTRIN) 800 mg, Oral, 3 times daily PRN, Take with food/ milk. Avoid other NSAIDs.    metFORMIN (GLUCOPHAGE) 500 mg, Oral, With breakfast    ONETOUCH DELICA PLUS LANCET 33 gauge Misc USE TO TEST BLOOD SUGAR ONCE DAILY    ONETOUCH VERIO FLEX METER Misc USE TO CHECK BLOOD SUGAR ONCE DAILY    pantoprazole (PROTONIX) 40 mg, Oral, Daily       Allergies  Review of patient's allergies indicates:  No Known Allergies    Physical Examination     Visit Vitals  /75 (BP Location: Left arm, Patient Position: Sitting, BP Method: Large (Automatic))   Pulse 82   Temp 98 °F (36.7 °C) (Oral)   Resp 16   Ht 5' 2" (1.575 m)   Wt 80.5 kg (177 lb 6.4 oz)   LMP  (LMP Unknown)   BMI 32.45 kg/m²       Physical Exam  Constitutional:       General: She is not in acute distress.     Appearance: Normal appearance. She is not ill-appearing or diaphoretic.   HENT:      Head: Normocephalic.   Cardiovascular:      Rate and Rhythm: Normal rate and regular rhythm.      Pulses:           Dorsalis pedis pulses are 2+ on the right side and 2+ on the left side.        Posterior tibial pulses are 2+ on the right side and 2+ on the left side.      Heart sounds: No murmur heard.  Pulmonary:      Effort: Pulmonary effort is normal. No respiratory distress.      Breath sounds: Normal breath sounds. No stridor. No wheezing, rhonchi or rales.   Musculoskeletal:      Cervical back: No tenderness.   Feet:      Right foot:      Protective Sensation: 5 sites tested.  5 sites sensed.      Skin integrity: Skin integrity normal.      Toenail Condition: Fungal disease present.     Left foot:      Protective Sensation: 5 sites tested.  5 sites sensed.      Skin integrity: Skin integrity normal.      Toenail Condition: Left toenails are normal.      Comments: Fungal disease improving  Lymphadenopathy:      Cervical: No " cervical adenopathy.   Skin:     General: Skin is warm and dry.   Neurological:      Mental Status: She is alert and oriented to person, place, and time. Mental status is at baseline.      Coordination: Coordination normal.      Gait: Gait normal.   Psychiatric:         Mood and Affect: Mood normal.         Behavior: Behavior normal.         Thought Content: Thought content normal.         Judgment: Judgment normal.           Results     Lab Results   Component Value Date    WBC 5.52 12/11/2023    RBC 4.92 12/11/2023    HGB 12.6 12/11/2023    HCT 40.1 12/11/2023    MCV 81.5 12/11/2023    MCH 25.6 (L) 12/11/2023    MCHC 31.4 (L) 12/11/2023    RDW 14.3 12/11/2023     12/11/2023    MPV 9.4 12/11/2023     Sodium Level   Date Value Ref Range Status   12/11/2023 140 136 - 145 mmol/L Final     Potassium Level   Date Value Ref Range Status   12/11/2023 3.9 3.5 - 5.1 mmol/L Final     Carbon Dioxide   Date Value Ref Range Status   12/11/2023 29 22 - 29 mmol/L Final     Blood Urea Nitrogen   Date Value Ref Range Status   12/11/2023 13.9 9.8 - 20.1 mg/dL Final     Creatinine   Date Value Ref Range Status   12/11/2023 0.80 0.55 - 1.02 mg/dL Final     Calcium Level Total   Date Value Ref Range Status   12/11/2023 10.2 8.4 - 10.2 mg/dL Final     Albumin Level   Date Value Ref Range Status   12/11/2023 3.9 3.5 - 5.0 g/dL Final     Bilirubin Total   Date Value Ref Range Status   12/11/2023 0.4 <=1.5 mg/dL Final     Alkaline Phosphatase   Date Value Ref Range Status   12/11/2023 82 40 - 150 unit/L Final     Aspartate Aminotransferase   Date Value Ref Range Status   12/11/2023 16 5 - 34 unit/L Final     Alanine Aminotransferase   Date Value Ref Range Status   12/11/2023 12 0 - 55 unit/L Final     Estimated GFR-Non    Date Value Ref Range Status   09/27/2018 >105 >>=90 mL/min Final     Lab Results   Component Value Date    CHOL 177 12/11/2023     Lab Results   Component Value Date    HDL 46 12/11/2023     No  "results found for: "LDLCALC"  Lab Results   Component Value Date    TRIG 129 12/11/2023     No results found for: "CHOLHDL"  Lab Results   Component Value Date    TSH 3.858 12/11/2023     Lab Results   Component Value Date    PHUR 7.0 10/08/2018    PROTEINUA Negative 10/08/2018    GLUCUA Normal 10/08/2018    KETONESU Negative 10/08/2018    OCCULTUA Negative 10/08/2018    NITRITE Negative 10/08/2018    LEUKOCYTESUR Negative 10/08/2018     Lab Results   Component Value Date    HGBA1C 6.2 12/11/2023    HGBA1C 6.2 06/12/2023    HGBA1C 6.5 12/12/2022     No results found for: "MICROALBUR", "[id number]"   No results found for this or any previous visit.         Assessment       ICD-10-CM ICD-9-CM   1. Lumbar degenerative disc disease  M51.36 722.52   2. Lumbar spondylosis  M47.816 721.3   3. Primary osteoarthritis of both hips  M16.0 715.15   4. Primary hypertension  I10 401.9   5. Type 2 diabetes mellitus without complication, without long-term current use of insulin  E11.9 250.00   6. Dyslipidemia, goal LDL below 70  E78.5 272.4   7. BMI 32.0-32.9,adult  Z68.32 V85.32   8. Hypertension, unspecified type  I10 401.9   9. Generalized anxiety disorder  F41.1 300.02   10. Vitreous floaters, unspecified laterality  H43.399 379.24       Plan       Problem List Items Addressed This Visit          Neuro    Lumbar degenerative disc disease - Primary    Overview     MRI lumbar spine 1/19/22 FINDINGS:  For the purpose of this report, the most inferior well  developed intervertebral disc space is presumed to represent L5-S1.  Lumbar vertebrae stature is maintained and alignment is unremarkable.  No acute marrow edematous signal. The visualized thoracic cord is  unremarkable. The conus medullaris terminates at T12. The L3-L4, L4-L5  and L5-S1 discs are partially desiccated. Disc segmental analysis is  given below:     At L1-L2, disc is unremarkable. Central canal is not stenosed. There  are no narrowings of the neuroforamen.     At " L2-L3, disc is unremarkable. Bilateral facets arthropathy. There is  no significant central canal stenosis. There are no narrowings of the  neuroforamen.     At L3-L4, disc height is preserved. Bilateral degenerative hypertrophy  of the facet joints and mild ligamentum flavum thickening. There is no  significant central canal stenosis. There are no narrowings of the  neuroforamen.     At L4-L5, there is bulging of annulus fibrosis which flattens the  ventral thecal sac. There is bilateral symmetrical thickening of  ligamentum flavum and facets arthropathy. These findings combine to  cause mild central canal stenosis. There are no narrowings of the  neuroforamen.     At L5-S1, there is central disc bulge which indents the ventral thecal  sac. Bilateral facets arthropathy. Central canal is not stenosed.  There are no narrowings of the neuroforamen     IMPRESSION:     Lumbar degenerative disc disease and spondylosis level by level  discussed above.  Evaluated by Neurosurgeon in [place] at [facility] last August 2022- refer to Dr. Renay Cid note           Current Assessment & Plan     Pt continues with chronic low back pain. Encouraged to f/u with Dr. Cid and to call office for an appt. Again ordered referral to Dr. Barton for pain management and pt may also contact insurance for accepting providers. She verb understanding.          Relevant Orders    Ambulatory referral/consult to Pain Clinic    X-Ray Lumbar Spine 5 View    Lumbar spondylosis    Overview     See LDD.         Current Assessment & Plan     See LDD         Relevant Orders    Ambulatory referral/consult to Pain Clinic    X-Ray Lumbar Spine 5 View       Psychiatric    Generalized anxiety disorder       Ophtho    Floaters    Relevant Orders    Ambulatory referral/consult to Ophthalmology       Cardiac/Vascular    Dyslipidemia, goal LDL below 70    Current Assessment & Plan     Lab Results   Component Value Date    CHOL 177 12/11/2023     Lab  "Results   Component Value Date    HDL 46 12/11/2023   No results found for: "LDLCALC"  Lab Results   Component Value Date    TRIG 129 12/11/2023     Avoid tobacco/ alcohol  Follow low fat/low cholesterol diet such as avoid/ decrease hsieh, sausage, fried foods, cookies, cakes, chips, cheese, whole milk, butter, mayonnaise. Add olive oil, avocados, lean meats, fresh fruits/ vegetables, heart healthy nuts to diet.  Educated on health benefits of exercise 5 days/ week of at least 30 minutes moderate intensity exercise (brisk walking) and 2 days/ week of muscle strength activities  Not interested in statin therapy at this time  Encouraged lifestyle modifications            Relevant Orders    CBC Auto Differential    Comprehensive Metabolic Panel    Hemoglobin A1C    Lipid Panel    Hypertension    Current Assessment & Plan     BP Readings from Last 3 Encounters:   12/12/23 120/75   11/12/23 108/72   11/09/23 135/85   Follow low sodium diet, < 2 gm/day (avoid high salty foods such as processed meats/ sausage/hsieh/ sandwich meat, chips, pickles, cheese, crackers and soft drinks/ electrolyte replacement drinks).  Avoid tobacco/ alcohol use  Educated on health benefits of at least 5 days/ week of 30 minutes moderate intensity exercise (brisk walking) and 2 or more days/ week of muscle strength activities  Daily ASA 81 mg for CV prevention  Continue current medication regimen             Relevant Medications    amLODIPine (NORVASC) 5 MG tablet    Other Relevant Orders    CBC Auto Differential    Comprehensive Metabolic Panel    Hemoglobin A1C    Lipid Panel       Endocrine    BMI 32.0-32.9,adult    Overview     BMI 32.45  Educated on increased risk of disease s/t obesity.  Educated on health benefits of at least 5 days/ week of 30 minutes moderate intensity exercise (brisk walking) and 2 or more days/ week of muscle strength activities (as tolerated).  Eat well balanced diet of fresh fruits/ vegetables, whole grains, " lean meats and limit high carbohydrate foods.            Relevant Orders    CBC Auto Differential    Comprehensive Metabolic Panel    Hemoglobin A1C    Lipid Panel    Type 2 diabetes mellitus without complication, without long-term current use of insulin    Current Assessment & Plan     A1c improved to 6.2%  Hypoglycemia episodes: denies  Microalbumin: see comments  Educated on ADA diet: eliminate/decrease high carbohydrate foods (rice, pasta, bread, potatoes (french fries, chips), candy, sweets, fruit juices, canned fruit, junk food, crackers, carbonated beverages)  Educated on health benefits of at least 5 days/ week of 30 minutes moderate intensity exercise (brisk walking) and 2 or more days/ week of muscle strength activities  Avoid alcohol or tobacco use  Eye exam: fundus photos today 12/12/23 completed  Foot exam: 12/12/23  Okay for patient to continue with Metformin 0.5 tablet BID with meals         Relevant Medications    metFORMIN (GLUCOPHAGE) 500 MG tablet    Other Relevant Orders    Diabetic Eye Screening Photo (Completed)    CBC Auto Differential    Comprehensive Metabolic Panel    Hemoglobin A1C    Lipid Panel    Ambulatory referral/consult to Ophthalmology       Orthopedic    Osteoarthritis, hip, bilateral    Overview     XR b/l hips 3/23/22 with minimal degenerative changes  Evaluated by CHINA Martinez and recommended neurosurgery evaluation as likely contributing factor of pain            Current Assessment & Plan     Pain mgmt referral ordered again to Dr. Barton. Pt can contact ins also for accepting providers. Enc f/u with neurosurgery          Relevant Orders    Ambulatory referral/consult to Pain Clinic       Future Appointments   Date Time Provider Department Center   6/12/2024  1:40 PM Shira Funk NP [facility] [place]   9/20/2024  8:50 AM Faith Calderon, ANP [facility] GYN [place] [facility]        Follow up in about 6 months (around 6/12/2024) for virtual with fasting labs .    Signature:  Shira  L Good, NP  [facility]  [address] [facility] [address]    Date of encounter: 12/12/23

## 2023-12-12 NOTE — TELEPHONE ENCOUNTER
Please let pt know eye photos did not show any retinopathy. I placed referral to [facility] eye clinic to establish for her concerns.     Please give her contact info to the clinic.     Thank you

## 2023-12-12 NOTE — ASSESSMENT & PLAN NOTE
"Lab Results   Component Value Date    CHOL 177 12/11/2023     Lab Results   Component Value Date    HDL 46 12/11/2023     No results found for: "LDLCALC"  Lab Results   Component Value Date    TRIG 129 12/11/2023       Avoid tobacco/ alcohol  Follow low fat/low cholesterol diet such as avoid/ decrease hsieh, sausage, fried foods, cookies, cakes, chips, cheese, whole milk, butter, mayonnaise. Add olive oil, avocados, lean meats, fresh fruits/ vegetables, heart healthy nuts to diet.  Educated on health benefits of exercise 5 days/ week of at least 30 minutes moderate intensity exercise (brisk walking) and 2 days/ week of muscle strength activities  Not interested in statin therapy at this time  Encouraged lifestyle modifications     "

## 2023-12-12 NOTE — ASSESSMENT & PLAN NOTE
Pain mgmt referral ordered again to Dr. Barton. Pt can contact ins also for accepting providers. Enc f/u with neurosurgery

## 2023-12-18 ENCOUNTER — HOSPITAL ENCOUNTER (EMERGENCY)
Facility: HOSPITAL | Age: 53
Discharge: HOME | End: 2023-12-18
Attending: EMERGENCY MEDICINE
Payer: MEDICAID

## 2023-12-18 VITALS
RESPIRATION RATE: 16 BRPM | WEIGHT: 174 LBS | SYSTOLIC BLOOD PRESSURE: 130 MMHG | HEART RATE: 86 BPM | HEIGHT: 62 IN | OXYGEN SATURATION: 99 % | DIASTOLIC BLOOD PRESSURE: 74 MMHG | TEMPERATURE: 98 F | BODY MASS INDEX: 32.02 KG/M2

## 2023-12-18 DIAGNOSIS — J06.9 VIRAL URI WITH COUGH: Primary | ICD-10-CM

## 2023-12-18 LAB
FLUAV AG UPPER RESP QL IA.RAPID: NOT DETECTED
FLUBV AG UPPER RESP QL IA.RAPID: NOT DETECTED
SARS-COV-2 RNA RESP QL NAA+PROBE: NOT DETECTED
STREP A PCR (OHS): NOT DETECTED

## 2023-12-18 PROCEDURE — 0240U COVID/FLU A&B PCR: CPT | Performed by: PHYSICIAN ASSISTANT

## 2023-12-18 PROCEDURE — 99283 EMERGENCY DEPT VISIT LOW MDM: CPT

## 2023-12-18 PROCEDURE — 25000003 PHARM REV CODE 250: Performed by: PHYSICIAN ASSISTANT

## 2023-12-18 PROCEDURE — 87651 STREP A DNA AMP PROBE: CPT | Performed by: PHYSICIAN ASSISTANT

## 2023-12-18 RX ORDER — CETIRIZINE HYDROCHLORIDE 10 MG/1
10 TABLET ORAL DAILY
Qty: 30 TABLET | Refills: 0 | Status: SHIPPED | OUTPATIENT
Start: 2023-12-18 | End: 2024-01-17

## 2023-12-18 RX ORDER — ACETAMINOPHEN 500 MG
1000 TABLET ORAL
Status: COMPLETED | OUTPATIENT
Start: 2023-12-18 | End: 2023-12-18

## 2023-12-18 RX ORDER — FLUTICASONE PROPIONATE 50 MCG
1 SPRAY, SUSPENSION (ML) NASAL 2 TIMES DAILY PRN
Qty: 15 G | Refills: 0 | Status: SHIPPED | OUTPATIENT
Start: 2023-12-18

## 2023-12-18 RX ORDER — BENZONATATE 100 MG/1
100 CAPSULE ORAL 3 TIMES DAILY PRN
Qty: 21 CAPSULE | Refills: 0 | Status: SHIPPED | OUTPATIENT
Start: 2023-12-18 | End: 2023-12-25

## 2023-12-18 RX ADMIN — ACETAMINOPHEN 1000 MG: 500 TABLET ORAL at 06:12

## 2023-12-19 NOTE — ED PROVIDER NOTES
Encounter Date: 2023       History     Chief Complaint   Patient presents with    Cough     53-year-old female presents to ED for evaluation of ear pain, itching throat and cough increasing over the past 3 days.  Patient states she feels a scratchy irritated throat.  Reports that she started developing pain today.  Reports some minor cough.  Denies any fever.  Has been taking over-the-counter cold medicine without relief.  Denies any shortness of breath or chest pain.  States son at home recently had influenza.    The history is provided by the patient. No  was used.     Review of patient's allergies indicates:  No Known Allergies  Past Medical History:   Diagnosis Date    Abnormal Pap smear of cervix     Anemia     Dyslipidemia, goal LDL below 70 2023    GERD (gastroesophageal reflux disease)     Hypertension     Personal history of colonic polyps 2022     Past Surgical History:   Procedure Laterality Date     SECTION      4 total    COLONOSCOPY  2022    TUBAL LIGATION       Family History   Problem Relation Age of Onset    Hypertension Mother     Diabetes Mother     Hypertension Father     Heart disease Sister     Hypertension Sister     Stroke Sister     Cancer Maternal Grandmother     Stomach cancer Maternal Aunt     Colon cancer Maternal Uncle      Social History     Tobacco Use    Smoking status: Former     Current packs/day: 0.00     Average packs/day: 1 pack/day for 5.0 years (5.0 ttl pk-yrs)     Types: Cigarettes     Start date:      Quit date: 2019     Years since quittin.9    Smokeless tobacco: Never   Substance Use Topics    Alcohol use: Never    Drug use: Not Currently     Types: Cocaine     Comment:      Review of Systems   Constitutional:  Negative for fatigue and fever.   HENT:  Positive for ear pain and sore throat. Negative for congestion and rhinorrhea.    Respiratory:  Positive for cough. Negative for shortness of breath and  wheezing.    Cardiovascular:  Negative for chest pain.   Gastrointestinal:  Negative for abdominal pain, nausea and vomiting.   Musculoskeletal:  Negative for myalgias.   Neurological:  Negative for headaches.   All other systems reviewed and are negative.      Physical Exam     Initial Vitals [12/18/23 1817]   BP Pulse Resp Temp SpO2   130/74 86 16 97.9 °F (36.6 °C) 99 %      MAP       --         Physical Exam    Nursing note and vitals reviewed.  Constitutional: She appears well-developed and well-nourished.   HENT:   Head: Normocephalic and atraumatic.   Right Ear: Tympanic membrane and external ear normal.   Left Ear: Tympanic membrane and external ear normal.   Mouth/Throat: Uvula is midline, oropharynx is clear and moist and mucous membranes are normal. No trismus in the jaw. No uvula swelling. No oropharyngeal exudate, posterior oropharyngeal edema or posterior oropharyngeal erythema.   Eyes: Conjunctivae are normal. Pupils are equal, round, and reactive to light.   Neck: Neck supple.   Normal range of motion.  Cardiovascular:  Normal rate, regular rhythm and normal heart sounds.           Pulmonary/Chest: Breath sounds normal. She has no wheezes. She has no rhonchi. She has no rales.   Abdominal: Abdomen is soft. Bowel sounds are normal. There is no abdominal tenderness.   Musculoskeletal:         General: Normal range of motion.      Cervical back: Normal range of motion and neck supple.     Neurological: She is alert and oriented to person, place, and time.   Skin: Skin is warm and dry.   Psychiatric: She has a normal mood and affect.         ED Course   Procedures  Labs Reviewed   COVID/FLU A&B PCR - Normal    Narrative:     The Xpert Xpress SARS-CoV-2/FLU/RSV plus is a rapid, multiplexed real-time PCR test intended for the simultaneous qualitative detection and differentiation of SARS-CoV-2, Influenza A, Influenza B, and respiratory syncytial virus (RSV) viral RNA in either nasopharyngeal swab or nasal  swab specimens.         STREP GROUP A BY PCR - Normal    Narrative:     The Xpert Xpress Strep A test is a rapid, qualitative in vitro diagnostic test for the detection of Streptococcus pyogenes (Group A ß-hemolytic Streptococcus, Strep A) in throat swab specimens from patients with signs and symptoms of pharyngitis.            Imaging Results    None          Medications   acetaminophen tablet 1,000 mg (1,000 mg Oral Given 12/18/23 1843)     Medical Decision Making  53-year-old female presents to ED for evaluation of ear pain, itching throat and cough increasing over the past 3 days.  Patient states she feels a scratchy irritated throat.  Reports that she started developing pain today.  Reports some minor cough.  Denies any fever.  Has been taking over-the-counter cold medicine without relief.  Denies any shortness of breath or chest pain.  States son at home recently had influenza.    Differential diagnosis includes but isn't limited to Viral syndrome, COVID, flu, strep, sinusitis, bronchitis      Amount and/or Complexity of Data Reviewed  Labs: ordered. Decision-making details documented in ED Course.  Discussion of management or test interpretation with external provider(s): Patient afebrile and in no acute distress.  Remained hemodynamically stable throughout ED stay.  SpO2 of 99%.  Complains of viral-like symptoms.  Complains of scratchy throat with cough.  Strep negative.  Influenza and COVID negative.  Will treat symptomatically.  Return ED precautions given.  Patient verbalizes understanding and agrees with plan of care    Risk  OTC drugs.  Prescription drug management.               ED Course as of 12/18/23 1909   Mon Dec 18, 2023   1904 STREP A PCR ([facility]): Not Detected [SL]   1904 Influenza A, Molecular: Not Detected [SL]   1904 Influenza B, Molecular: Not Detected [SL]   1904 SARS-CoV2 (COVID-19) Qualitative PCR: Not Detected [SL]      ED Course User Index  [SL] Kirsty Alexandre PA                            Clinical Impression:  Final diagnoses:  [J06.9] Viral URI with cough (Primary)          ED Disposition Condition    Discharge Stable          ED Prescriptions       Medication Sig Dispense Start Date End Date Auth. Provider    cetirizine (ZYRTEC) 10 MG tablet Take 1 tablet (10 mg total) by mouth once daily. 30 tablet 12/18/2023 1/17/2024 Kirsty Alexandre PA    fluticasone propionate (FLONASE) 50 mcg/actuation nasal spray 1 spray (50 mcg total) by Each Nostril route 2 (two) times daily as needed for Rhinitis. 15 g 12/18/2023 -- Kirsty Alexandre PA    benzonatate (TESSALON) 100 MG capsule Take 1 capsule (100 mg total) by mouth 3 (three) times daily as needed for Cough. 21 capsule 12/18/2023 12/25/2023 Kirsty Alexandre PA          Follow-up Information       Follow up With Specialties Details Why Contact Info    Shira Funk NP Nurse Practitioner Call  As needed [address] [address]  [phone]               Kirsty Alexandre PA  12/18/23 4110

## 2023-12-19 NOTE — DISCHARGE INSTRUCTIONS
Hydrate with plenty of water. Tylenol and ibuprofen in rotation for fever/aches. Use allergy medication daily. May use over the counter cough syrup.  Use tessalon perles for cough.

## 2023-12-20 ENCOUNTER — OFFICE VISIT (OUTPATIENT)
Dept: URGENT CARE | Facility: CLINIC | Age: 53
End: 2023-12-20
Payer: MEDICAID

## 2023-12-20 VITALS
BODY MASS INDEX: 32.64 KG/M2 | DIASTOLIC BLOOD PRESSURE: 80 MMHG | OXYGEN SATURATION: 100 % | HEIGHT: 62 IN | WEIGHT: 177.38 LBS | SYSTOLIC BLOOD PRESSURE: 136 MMHG | HEART RATE: 80 BPM | RESPIRATION RATE: 18 BRPM | TEMPERATURE: 99 F

## 2023-12-20 DIAGNOSIS — J11.1 INFLUENZA: Primary | ICD-10-CM

## 2023-12-20 DIAGNOSIS — R05.1 ACUTE COUGH: ICD-10-CM

## 2023-12-20 LAB
CTP QC/QA: YES
MOLECULAR STREP A: NEGATIVE
POC MOLECULAR INFLUENZA A AGN: NEGATIVE
POC MOLECULAR INFLUENZA B AGN: POSITIVE
SARS-COV-2 RDRP RESP QL NAA+PROBE: NEGATIVE

## 2023-12-20 PROCEDURE — 99213 PR OFFICE/OUTPT VISIT, EST, LEVL III, 20-29 MIN: ICD-10-PCS | Mod: S$PBB,,, | Performed by: FAMILY MEDICINE

## 2023-12-20 PROCEDURE — 87502 INFLUENZA DNA AMP PROBE: CPT | Mod: PBBFAC | Performed by: FAMILY MEDICINE

## 2023-12-20 PROCEDURE — 87635 SARS-COV-2 COVID-19 AMP PRB: CPT | Mod: PBBFAC | Performed by: FAMILY MEDICINE

## 2023-12-20 PROCEDURE — 99215 OFFICE O/P EST HI 40 MIN: CPT | Mod: PBBFAC | Performed by: FAMILY MEDICINE

## 2023-12-20 PROCEDURE — 99213 OFFICE O/P EST LOW 20 MIN: CPT | Mod: S$PBB,,, | Performed by: FAMILY MEDICINE

## 2023-12-20 PROCEDURE — 87651 STREP A DNA AMP PROBE: CPT | Mod: PBBFAC | Performed by: FAMILY MEDICINE

## 2023-12-20 RX ORDER — PROMETHAZINE HYDROCHLORIDE AND DEXTROMETHORPHAN HYDROBROMIDE 6.25; 15 MG/5ML; MG/5ML
5 SYRUP ORAL
Qty: 120 ML | Refills: 0 | Status: SHIPPED | OUTPATIENT
Start: 2023-12-20

## 2023-12-20 RX ORDER — OSELTAMIVIR PHOSPHATE 75 MG/1
75 CAPSULE ORAL 2 TIMES DAILY
Qty: 10 CAPSULE | Refills: 0 | Status: SHIPPED | OUTPATIENT
Start: 2023-12-20 | End: 2023-12-25

## 2023-12-20 NOTE — LETTER
December 20, 2023      [facility]  [facility] [facility] [address]  Phone: [phone]       Patient: Tova Stack   YOB: 1970  Date of Visit: 12/20/2023    To Whom It May Concern:    Evens Stack  was at [facility] on 12/20/2023. The patient may return to work/school on 12/24/23 with no restrictions. If you have any questions or concerns, or if I can be of further assistance, please do not hesitate to contact me.    Sincerely,    FELIPE Bee MD

## 2023-12-21 NOTE — PROGRESS NOTES
"Subjective:       Patient ID: Tova Stack is a 53 y.o. female.    Vitals:  height is 5' 2" (1.575 m) and weight is 80.5 kg (177 lb 6.4 oz). Her oral temperature is 98.9 °F (37.2 °C). Her blood pressure is 136/80 and her pulse is 80. Her respiration is 18 and oxygen saturation is 100%.     Chief Complaint: Cough (Sore throat, onset 5 days ago)    Patient with possible 2-3 days of symptoms, cough, sore throat, subjective fever.    Cough  This is a new problem. The problem has been unchanged. The cough is Non-productive. Associated symptoms include headaches, myalgias, rhinorrhea and a sore throat. Pertinent negatives include no chest pain, ear pain, hemoptysis, rash, shortness of breath or wheezing.         HENT:  Positive for sore throat. Negative for ear pain.    Cardiovascular:  Negative for chest pain.   Respiratory:  Positive for cough. Negative for bloody sputum, shortness of breath and wheezing.    Musculoskeletal:  Positive for muscle ache.   Skin:  Negative for rash.   Neurological:  Positive for headaches.       Objective:   Physical Exam   Constitutional: She appears well-developed.  Non-toxic appearance. She does not appear ill. No distress.   HENT:   Head: Atraumatic.   Nose: Rhinorrhea present. No purulent discharge. Right sinus exhibits no maxillary sinus tenderness and no frontal sinus tenderness. Left sinus exhibits no maxillary sinus tenderness and no frontal sinus tenderness.   Mouth/Throat: Uvula is midline. Posterior oropharyngeal erythema present. No oropharyngeal exudate.   Eyes: Right eye exhibits no discharge. Left eye exhibits no discharge. Extraocular movement intact   Neck: Neck supple. No neck rigidity present.   Cardiovascular: Regular rhythm.   Pulmonary/Chest: Effort normal and breath sounds normal. No respiratory distress. She has no wheezes. She has no rales.   Lymphadenopathy:     She has no cervical adenopathy.   Neurological: She is alert.   Skin: Skin is warm, dry and not " diaphoretic.   Psychiatric: Her behavior is normal.   Nursing note and vitals reviewed.    Results for orders placed or performed in visit on 12/20/23   POCT COVID-19 Rapid Screening   Result Value Ref Range    POC Rapid COVID Negative Negative     Acceptable Yes    POCT Strep A, Molecular   Result Value Ref Range    Molecular Strep A, POC Negative Negative     Acceptable Yes    POCT Influenza A/B MOLECULAR   Result Value Ref Range    POC Molecular Influenza A Ag Negative Negative, Not Reported    POC Molecular Influenza B Ag Positive (A) Negative, Not Reported     Acceptable Yes          Assessment:     1. Influenza    2. Acute cough          Plan:   Symptoms may have only been present for 2-3 days?  Given this, decided to give a course of Tamiflu.  Phenergan DM with sedation/driving precautions.  Increase fluids.  Contagious precautions.    Please follow instructions on patient education material.  Return to urgent care in 2 to 3 days if symptoms are not improving. Seek care immediately if new or worsening symptoms develop.    Influenza    Acute cough  -     POCT COVID-19 Rapid Screening  -     POCT Strep A, Molecular  -     POCT Influenza A/B MOLECULAR    Other orders  -     oseltamivir (TAMIFLU) 75 MG capsule; Take 1 capsule (75 mg total) by mouth 2 (two) times daily. for 5 days  Dispense: 10 capsule; Refill: 0  -     promethazine-dextromethorphan (PROMETHAZINE-DM) 6.25-15 mg/5 mL Syrp; Take 5 mLs by mouth every 6 to 8 hours as needed (cough). May cause sedation.  Do not drive or operate heavy machinery.  Dispense: 120 mL; Refill: 0        Please note: This chart was completed via voice to text dictation. It may contain typographical/word recognition errors. If there are any questions, please contact the provider for final clarification.

## 2024-01-09 ENCOUNTER — TELEPHONE (OUTPATIENT)
Dept: INTERNAL MEDICINE | Facility: CLINIC | Age: 54
End: 2024-01-09
Payer: MEDICAID

## 2024-01-09 DIAGNOSIS — Z12.31 VISIT FOR SCREENING MAMMOGRAM: Primary | ICD-10-CM

## 2024-01-09 NOTE — TELEPHONE ENCOUNTER
----- Message from Dora Summers LPN sent at 1/9/2024  9:04 AM CST -----  Regarding: FW: mammogram  Lov was on 12/12/23, NOV is on 6/12/24. I see where mammogram was ordered in 2022 but doesn't look like one was ordered for this year  ----- Message -----  From: Gientte Dominguez  Sent: 1/9/2024   8:56 AM CST  To: [facility]  Subject: mammogram                                        Type:  Mammogram    Caller is requesting to schedule their annual mammogram appointment.  Order is not listed in EPIC.  Please enter order and contact patient to schedule.    Name of Caller:pt    Where would they like the mammogram performed? [facility]    Would the patient rather a call back or a response via [facility]?  Yes    Best Call Back Number: [phone]    Additional Information:  pt says she received a letter to contact her PCP for scheduling a mammogram, please advise, thanks

## 2024-01-10 ENCOUNTER — PATIENT MESSAGE (OUTPATIENT)
Dept: INTERNAL MEDICINE | Facility: CLINIC | Age: 54
End: 2024-01-10
Payer: MEDICAID

## 2024-01-15 DIAGNOSIS — E11.9 TYPE 2 DIABETES MELLITUS WITHOUT COMPLICATION, WITHOUT LONG-TERM CURRENT USE OF INSULIN: ICD-10-CM

## 2024-01-16 RX ORDER — BLOOD-GLUCOSE METER
EACH MISCELLANEOUS
Qty: 30 STRIP | Refills: 11 | Status: SHIPPED | OUTPATIENT
Start: 2024-01-16

## 2024-01-16 NOTE — TELEPHONE ENCOUNTER
Pharmacy requesting refill for One Touch Verio test strips          LOV:12/12/23      NOV:6/12/24

## 2024-01-23 ENCOUNTER — OFFICE VISIT (OUTPATIENT)
Dept: OPHTHALMOLOGY | Facility: CLINIC | Age: 54
End: 2024-01-23
Payer: MEDICAID

## 2024-01-23 VITALS — HEIGHT: 62 IN | WEIGHT: 177 LBS | BODY MASS INDEX: 32.57 KG/M2

## 2024-01-23 DIAGNOSIS — E11.9 TYPE 2 DIABETES MELLITUS WITHOUT COMPLICATION, WITHOUT LONG-TERM CURRENT USE OF INSULIN: ICD-10-CM

## 2024-01-23 DIAGNOSIS — H43.399 VITREOUS FLOATERS, UNSPECIFIED LATERALITY: ICD-10-CM

## 2024-01-23 PROCEDURE — 92134 CPTRZ OPH DX IMG PST SGM RTA: CPT | Mod: PBBFAC,PN | Performed by: STUDENT IN AN ORGANIZED HEALTH CARE EDUCATION/TRAINING PROGRAM

## 2024-01-23 PROCEDURE — 92134 CPTRZ OPH DX IMG PST SGM RTA: CPT | Mod: PBBFAC,PN | Performed by: OPHTHALMOLOGY

## 2024-01-23 PROCEDURE — 99213 OFFICE O/P EST LOW 20 MIN: CPT | Mod: PBBFAC,PN | Performed by: STUDENT IN AN ORGANIZED HEALTH CARE EDUCATION/TRAINING PROGRAM

## 2024-01-23 PROCEDURE — 92250 FUNDUS PHOTOGRAPHY W/I&R: CPT | Mod: 59,PBBFAC,PN | Performed by: OPHTHALMOLOGY

## 2024-01-23 RX ORDER — PHENYLEPH/TROPICAMIDE IN WATER 2.5 %-1 %
1 DROPS OPHTHALMIC (EYE) ONCE
Status: COMPLETED | OUTPATIENT
Start: 2024-01-23 | End: 2024-01-23

## 2024-01-23 RX ADMIN — Medication 1 DROP: at 01:01

## 2024-01-23 NOTE — PROGRESS NOTES
HPI     Diabetic Eye Exam     Additional comments: Floaters corner of  both eyes          Last edited by Yenny Nicholson LPN on 1/23/2024 12:33 PM.            Assessment /Plan     For exam results, see Encounter Report.    Type 2 diabetes mellitus without complication, without long-term current use of insulin  -     Ambulatory referral/consult to Ophthalmology  -     tropicamide /PHENYLephrine opthalmic solution 1 drop    Vitreous floaters, unspecified laterality  -     Ambulatory referral/consult to Ophthalmology      OCT Macula 1/23/24  OD: 199, normal foveal contour, no IRF or SRF  OS: 226, normal foveal contour, no IRF or SRF    OCT RNFL 1/23/24  OD: 88, all green  OS: 98, yellow N      1) DM II w/o Retinopathy  - Last A1c 6.2%  - OCT macula and fundus photos done 1/23/24  - Annual DFE     2) Vitreous Syneresis, OU  - Reports episodic flashes of light in temporal visual field, come and go, both eyes, several months  - No floaters  - No holes/tears/detach    3) Combined Cataract, OU  - Likely VS OS  - Patient not interested in CEIOL at this time  - Will trial pair of glasses    RTC 6-12 months for cat eval if interested

## 2024-01-29 ENCOUNTER — HOSPITAL ENCOUNTER (EMERGENCY)
Facility: HOSPITAL | Age: 54
Discharge: HOME | End: 2024-01-29
Attending: STUDENT IN AN ORGANIZED HEALTH CARE EDUCATION/TRAINING PROGRAM
Payer: MEDICAID

## 2024-01-29 VITALS
DIASTOLIC BLOOD PRESSURE: 76 MMHG | RESPIRATION RATE: 18 BRPM | SYSTOLIC BLOOD PRESSURE: 125 MMHG | WEIGHT: 175 LBS | BODY MASS INDEX: 32.2 KG/M2 | HEART RATE: 80 BPM | TEMPERATURE: 98 F | HEIGHT: 62 IN | OXYGEN SATURATION: 100 %

## 2024-01-29 DIAGNOSIS — R10.31 RIGHT LOWER QUADRANT ABDOMINAL PAIN: Primary | ICD-10-CM

## 2024-01-29 LAB
ALBUMIN SERPL-MCNC: 3.9 G/DL (ref 3.5–5)
ALBUMIN/GLOB SERPL: 1 RATIO (ref 1.1–2)
ALP SERPL-CCNC: 72 UNIT/L (ref 40–150)
ALT SERPL-CCNC: 14 UNIT/L (ref 0–55)
APPEARANCE UR: CLEAR
AST SERPL-CCNC: 17 UNIT/L (ref 5–34)
BACTERIA #/AREA URNS AUTO: ABNORMAL /HPF
BASOPHILS # BLD AUTO: 0.03 X10(3)/MCL
BASOPHILS NFR BLD AUTO: 0.4 %
BILIRUB SERPL-MCNC: 0.3 MG/DL
BILIRUB UR QL STRIP.AUTO: NEGATIVE
BUN SERPL-MCNC: 13.5 MG/DL (ref 9.8–20.1)
CALCIUM SERPL-MCNC: 9.9 MG/DL (ref 8.4–10.2)
CHLORIDE SERPL-SCNC: 103 MMOL/L (ref 98–107)
CO2 SERPL-SCNC: 30 MMOL/L (ref 22–29)
COLOR UR AUTO: YELLOW
CREAT SERPL-MCNC: 0.82 MG/DL (ref 0.55–1.02)
EOSINOPHIL # BLD AUTO: 0.13 X10(3)/MCL (ref 0–0.9)
EOSINOPHIL NFR BLD AUTO: 1.9 %
ERYTHROCYTE [DISTWIDTH] IN BLOOD BY AUTOMATED COUNT: 14.5 % (ref 11.5–17)
GFR SERPLBLD CREATININE-BSD FMLA CKD-EPI: >60 MLS/MIN/1.73/M2
GLOBULIN SER-MCNC: 4.1 GM/DL (ref 2.4–3.5)
GLUCOSE SERPL-MCNC: 94 MG/DL (ref 74–100)
GLUCOSE UR QL STRIP.AUTO: NEGATIVE
HCT VFR BLD AUTO: 36.3 % (ref 37–47)
HGB BLD-MCNC: 11.9 G/DL (ref 12–16)
IMM GRANULOCYTES # BLD AUTO: 0.02 X10(3)/MCL (ref 0–0.04)
IMM GRANULOCYTES NFR BLD AUTO: 0.3 %
KETONES UR QL STRIP.AUTO: NEGATIVE
LEUKOCYTE ESTERASE UR QL STRIP.AUTO: NEGATIVE
LYMPHOCYTES # BLD AUTO: 2.67 X10(3)/MCL (ref 0.6–4.6)
LYMPHOCYTES NFR BLD AUTO: 38.9 %
MCH RBC QN AUTO: 26.3 PG (ref 27–31)
MCHC RBC AUTO-ENTMCNC: 32.8 G/DL (ref 33–36)
MCV RBC AUTO: 80.3 FL (ref 80–94)
MONOCYTES # BLD AUTO: 0.6 X10(3)/MCL (ref 0.1–1.3)
MONOCYTES NFR BLD AUTO: 8.7 %
NEUTROPHILS # BLD AUTO: 3.41 X10(3)/MCL (ref 2.1–9.2)
NEUTROPHILS NFR BLD AUTO: 49.8 %
NITRITE UR QL STRIP.AUTO: NEGATIVE
NRBC BLD AUTO-RTO: 0 %
PH UR STRIP.AUTO: 6.5 [PH]
PLATELET # BLD AUTO: 366 X10(3)/MCL (ref 130–400)
PMV BLD AUTO: 10 FL (ref 7.4–10.4)
POTASSIUM SERPL-SCNC: 3.6 MMOL/L (ref 3.5–5.1)
PROT SERPL-MCNC: 8 GM/DL (ref 6.4–8.3)
PROT UR QL STRIP.AUTO: NEGATIVE
RBC # BLD AUTO: 4.52 X10(6)/MCL (ref 4.2–5.4)
RBC #/AREA URNS AUTO: ABNORMAL /HPF
RBC UR QL AUTO: ABNORMAL
SODIUM SERPL-SCNC: 142 MMOL/L (ref 136–145)
SP GR UR STRIP.AUTO: 1.02 (ref 1–1.03)
SQUAMOUS #/AREA URNS AUTO: ABNORMAL /HPF
UROBILINOGEN UR STRIP-ACNC: 0.2
WBC # SPEC AUTO: 6.86 X10(3)/MCL (ref 4.5–11.5)
WBC #/AREA URNS AUTO: ABNORMAL /HPF

## 2024-01-29 PROCEDURE — 80053 COMPREHEN METABOLIC PANEL: CPT | Performed by: STUDENT IN AN ORGANIZED HEALTH CARE EDUCATION/TRAINING PROGRAM

## 2024-01-29 PROCEDURE — 25500020 PHARM REV CODE 255: Performed by: EMERGENCY MEDICINE

## 2024-01-29 PROCEDURE — 99285 EMERGENCY DEPT VISIT HI MDM: CPT | Mod: 25

## 2024-01-29 PROCEDURE — 85025 COMPLETE CBC W/AUTO DIFF WBC: CPT | Performed by: STUDENT IN AN ORGANIZED HEALTH CARE EDUCATION/TRAINING PROGRAM

## 2024-01-29 PROCEDURE — 81003 URINALYSIS AUTO W/O SCOPE: CPT | Performed by: STUDENT IN AN ORGANIZED HEALTH CARE EDUCATION/TRAINING PROGRAM

## 2024-01-29 RX ORDER — DICYCLOMINE HYDROCHLORIDE 20 MG/1
20 TABLET ORAL 4 TIMES DAILY
Qty: 40 TABLET | Refills: 0 | Status: SHIPPED | OUTPATIENT
Start: 2024-01-29 | End: 2024-02-08

## 2024-01-29 RX ADMIN — IOHEXOL 100 ML: 350 INJECTION, SOLUTION INTRAVENOUS at 07:01

## 2024-01-30 NOTE — ED PROVIDER NOTES
Encounter Date: 2024       History     Chief Complaint   Patient presents with    Abdominal Pain     Pt complaint of rlq abd pain for 3 days     HPI    53-year-old female with a past medical history of hypertension diabetes presents emergency department for right lower quadrant abdominal pain.  Patient states it has been going on for 3 days.  States it comes and goes.  States it is sharp.  Causes her to have some nausea.  No vomiting.  Denies any constipation or diarrhea.  No fevers.  Denies any burning on urination    Review of patient's allergies indicates:  No Known Allergies  Past Medical History:   Diagnosis Date    Abnormal Pap smear of cervix     Anemia     Dyslipidemia, goal LDL below 70 2023    GERD (gastroesophageal reflux disease)     Hypertension     Personal history of colonic polyps 2022     Past Surgical History:   Procedure Laterality Date     SECTION      4 total    COLONOSCOPY  2022    TUBAL LIGATION       Family History   Problem Relation Age of Onset    Hypertension Mother     Diabetes Mother     Hypertension Father     Heart disease Sister     Hypertension Sister     Stroke Sister     Cancer Maternal Grandmother     Stomach cancer Maternal Aunt     Colon cancer Maternal Uncle      Social History     Tobacco Use    Smoking status: Former     Current packs/day: 0.00     Average packs/day: 1 pack/day for 5.0 years (5.0 ttl pk-yrs)     Types: Cigarettes     Start date:      Quit date: 2019     Years since quittin.0    Smokeless tobacco: Never   Substance Use Topics    Alcohol use: Never    Drug use: Not Currently     Types: Cocaine     Comment:      Review of Systems   Constitutional:  Negative for fever.   HENT:  Negative for sore throat.    Respiratory:  Negative for cough and shortness of breath.    Cardiovascular:  Negative for chest pain.   Gastrointestinal:  Positive for abdominal pain and nausea. Negative for constipation, diarrhea and vomiting.    Genitourinary:  Negative for dysuria.   Musculoskeletal:  Negative for back pain.   Skin:  Negative for rash.   Neurological:  Negative for weakness and headaches.   Hematological:  Does not bruise/bleed easily.   All other systems reviewed and are negative.      Physical Exam     Initial Vitals [01/29/24 1803]   BP Pulse Resp Temp SpO2   125/76 80 18 98.4 °F (36.9 °C) 100 %      MAP       --         Physical Exam    Nursing note and vitals reviewed.  Constitutional: She appears well-developed and well-nourished. No distress.   Cardiovascular:  Normal rate and regular rhythm.           Pulmonary/Chest: Breath sounds normal. No respiratory distress. She has no wheezes. She has no rhonchi. She has no rales.   Abdominal: Abdomen is soft. There is abdominal tenderness (mild right lower quadrant abdominal pain). There is no rebound and no guarding.   Musculoskeletal:         General: No tenderness. Normal range of motion.     Neurological: She is alert and oriented to person, place, and time. She has normal strength.   Skin: Skin is warm. Capillary refill takes less than 2 seconds.         ED Course   Procedures  Labs Reviewed   COMPREHENSIVE METABOLIC PANEL - Abnormal; Notable for the following components:       Result Value    Carbon Dioxide 30 (*)     Globulin 4.1 (*)     Albumin/Globulin Ratio 1.0 (*)     All other components within normal limits   URINALYSIS, REFLEX TO URINE CULTURE - Abnormal; Notable for the following components:    Blood, UA Small (*)     All other components within normal limits   CBC WITH DIFFERENTIAL - Abnormal; Notable for the following components:    Hgb 11.9 (*)     Hct 36.3 (*)     MCH 26.3 (*)     MCHC 32.8 (*)     All other components within normal limits   URINALYSIS, MICROSCOPIC - Abnormal; Notable for the following components:    Bacteria, UA Few (*)     Squamous Epithelial Cells, UA Few (*)     All other components within normal limits   CBC W/ AUTO DIFFERENTIAL    Narrative:      The following orders were created for panel order CBC auto differential.  Procedure                               Abnormality         Status                     ---------                               -----------         ------                     CBC with Differential[[id number]]       Abnormal            Final result                 Please view results for these tests on the individual orders.          Imaging Results              CT Abdomen Pelvis With IV Contrast NO Oral Contrast (Final result)  Result time 01/29/24 19:38:11      Final result by Carmel Segundo MD (01/29/24 19:38:11)                   Impression:      No acute abnormality of the abdomen or pelvis.      Electronically signed by: Carmel Segundo  Date:    01/29/2024  Time:    19:38               Narrative:    EXAMINATION:  CT ABDOMEN PELVIS WITH IV CONTRAST    CLINICAL HISTORY:  Abdominal abscess/infection suspected;Abdominal pain, acute, nonlocalized;    TECHNIQUE:  CT imaging was performed of the abdomen and pelvis after the administration of intravenous contrast. Dose length product is 398 mGycm. Automatic exposure control, adjustment of mA/kV or iterative reconstruction technique was used to limit radiation dose.    COMPARISON:  CT abdomen pelvis dated 07/11/2018    FINDINGS:  Liver: Normal.    Gallbladder and biliary tree: No calcified gallstones. No intra or extrahepatic biliary ductal dilation.    Pancreas: Normal.    Spleen: Normal.    Adrenals: Normal.    Kidneys and ureters: Normal.    Bladder: Normal.    Reproductive organs: No pelvic masses.    Stomach/bowel: No evidence of bowel obstruction. Appendix is normal. No discernible bowel inflammation.    Lymph nodes: No pathologically enlarged lymph node identified.    Peritoneum: No ascites or free air. No fluid collection.    Vessels: No abdominal aortic aneurysm.    Abdominal wall: Normal.    Lung bases: No consolidation or pleural effusion.    Bones: No acute osseous findings.                                        Medications   iohexoL (OMNIPAQUE 350) injection 100 mL (100 mLs Intravenous Given 1/29/24 1922)     Medical Decision Making  differential diagnosis  Abdominal pain, UTI, appendicitis, diverticulitis, constipation,  as well as multiple other possible etiologies      Amount and/or Complexity of Data Reviewed  Labs: ordered. Decision-making details documented in ED Course.  Radiology: ordered.    Risk  Prescription drug management.               ED Course as of 01/29/24 1944 Mon Jan 29, 2024   1840 Leukocytes, UA: Negative [BS]   1840 NITRITE UA: Negative [BS]   1840 WBC: 6.86 [BS]   1840 Hemoglobin(!): 11.9 [BS]   1840 Hematocrit(!): 36.3 [BS]   1840 Platelet Count: 366 [BS]   1840 Transition of care at 7 PM. Dr. Hughes will assume care and determine appropriate treatment, care of this patient as well as disposition.   [BS]      ED Course User Index  [BS] Khang Garvin MD                           Clinical Impression:  Final diagnoses:  [R10.31] Right lower quadrant abdominal pain (Primary)          ED Disposition Condition    Discharge Stable          ED Prescriptions       Medication Sig Dispense Start Date End Date Auth. Provider    dicyclomine (BENTYL) 20 mg tablet Take 1 tablet (20 mg total) by mouth 4 (four) times daily. for 10 days 40 tablet 1/29/2024 2/8/2024 Maik Hughes MD          Follow-up Information       Follow up With Specialties Details Why Contact Info    Follow up with your primary MD in 3-5 days if not improved.  Return to ED for worsening symptoms.                 Maik Hughes MD  01/29/24 1944

## 2024-02-07 ENCOUNTER — HOSPITAL ENCOUNTER (OUTPATIENT)
Dept: RADIOLOGY | Facility: HOSPITAL | Age: 54
Discharge: HOME | End: 2024-02-07
Attending: NURSE PRACTITIONER
Payer: MEDICAID

## 2024-02-07 DIAGNOSIS — Z12.31 VISIT FOR SCREENING MAMMOGRAM: ICD-10-CM

## 2024-02-07 PROCEDURE — 77067 SCR MAMMO BI INCL CAD: CPT | Mod: TC

## 2024-02-07 PROCEDURE — 77067 SCR MAMMO BI INCL CAD: CPT | Mod: 26,,, | Performed by: RADIOLOGY

## 2024-02-07 PROCEDURE — 77063 BREAST TOMOSYNTHESIS BI: CPT | Mod: 26,,, | Performed by: RADIOLOGY

## 2024-05-23 ENCOUNTER — HOSPITAL ENCOUNTER (EMERGENCY)
Facility: HOSPITAL | Age: 54
Discharge: HOME | End: 2024-05-23
Attending: EMERGENCY MEDICINE
Payer: MEDICAID

## 2024-05-23 VITALS
RESPIRATION RATE: 18 BRPM | BODY MASS INDEX: 31.65 KG/M2 | OXYGEN SATURATION: 98 % | SYSTOLIC BLOOD PRESSURE: 125 MMHG | DIASTOLIC BLOOD PRESSURE: 63 MMHG | HEART RATE: 80 BPM | WEIGHT: 172 LBS | TEMPERATURE: 98 F | HEIGHT: 62 IN

## 2024-05-23 DIAGNOSIS — M25.569 KNEE PAIN: ICD-10-CM

## 2024-05-23 DIAGNOSIS — M79.606 LEG PAIN: ICD-10-CM

## 2024-05-23 PROCEDURE — 99284 EMERGENCY DEPT VISIT MOD MDM: CPT | Mod: 25

## 2024-05-23 RX ORDER — DICLOFENAC SODIUM 50 MG/1
50 TABLET, DELAYED RELEASE ORAL 2 TIMES DAILY PRN
Qty: 20 TABLET | Refills: 0 | Status: SHIPPED | OUTPATIENT
Start: 2024-05-23 | End: 2024-06-19 | Stop reason: ALTCHOICE

## 2024-05-24 NOTE — ED PROVIDER NOTES
Encounter Date: 2024       History     Chief Complaint   Patient presents with    Knee Pain     Reports left knee pain; denies trauma; tightness and swelling per pt;      53 y.o.  female with a history of DM, hypertension, and GERD presents to Emergency Department with a chief complaint of atraumatic L knee pain. Symptoms began today and have been constant since onset. Associated symptoms include L knee pain. Symptoms are aggravated with palpation and exertion and there are no alleviating factors. The patient denies CP, SOB, recent injury, dizziness, vomiting, or abdominal pain. No other reported symptoms at this time      The history is provided by the patient. No  was used.   Knee Pain  This is a new problem. The current episode started 12 to 24 hours ago. The problem occurs constantly. The problem has not changed since onset.Pertinent negatives include no chest pain, no abdominal pain, no headaches and no shortness of breath. The symptoms are aggravated by exertion. She has tried nothing for the symptoms.     Review of patient's allergies indicates:  No Known Allergies  Past Medical History:   Diagnosis Date    Abnormal Pap smear of cervix     Anemia     Diabetes mellitus     Dyslipidemia, goal LDL below 70 2023    GERD (gastroesophageal reflux disease)     Hypertension     Personal history of colonic polyps 2022     Past Surgical History:   Procedure Laterality Date     SECTION      4 total    COLONOSCOPY  2022    TUBAL LIGATION       Family History   Problem Relation Name Age of Onset    Hypertension Mother      Diabetes Mother      Hypertension Father      Heart disease Sister      Hypertension Sister      Stroke Sister      Cancer Maternal Grandmother      Stomach cancer Maternal Aunt      Colon cancer Maternal Uncle       Social History     Tobacco Use    Smoking status: Former     Current packs/day: 0.00     Average packs/day: 1 pack/day for  5.0 years (5.0 ttl pk-yrs)     Types: Cigarettes     Start date:      Quit date: 2019     Years since quittin.3    Smokeless tobacco: Never   Substance Use Topics    Alcohol use: Never    Drug use: Not Currently     Types: Cocaine     Comment:      Review of Systems   Constitutional:  Negative for chills, fatigue and fever.   Eyes:  Negative for photophobia and visual disturbance.   Respiratory:  Negative for cough, shortness of breath and stridor.    Cardiovascular:  Negative for chest pain and leg swelling.   Gastrointestinal:  Negative for abdominal pain, nausea and vomiting.   Musculoskeletal:  Positive for arthralgias and joint swelling. Negative for back pain and gait problem.   Skin:  Negative for color change and wound.   Neurological:  Negative for syncope, weakness and headaches.   All other systems reviewed and are negative.      Physical Exam     Initial Vitals [24]   BP Pulse Resp Temp SpO2   125/63 80 18 98.3 °F (36.8 °C) 98 %      MAP       --         Physical Exam    Nursing note and vitals reviewed.  Constitutional: She appears well-developed and well-nourished. She is not diaphoretic. She is cooperative.  Non-toxic appearance. No distress.   HENT:   Head: Normocephalic and atraumatic.   Right Ear: External ear normal.   Left Ear: External ear normal.   Nose: Nose normal.   Eyes: Conjunctivae and EOM are normal. Pupils are equal, round, and reactive to light.   Neck: Neck supple.   Normal range of motion.  Cardiovascular:  Normal rate, regular rhythm, S1 normal, S2 normal, normal heart sounds, intact distal pulses and normal pulses.           Pulmonary/Chest: Effort normal and breath sounds normal. No tachypnea and no bradypnea. No respiratory distress. She has no decreased breath sounds. She has no wheezes. She has no rhonchi. She has no rales. She exhibits no tenderness.   Abdominal: Abdomen is soft. Bowel sounds are normal. She exhibits no distension. There is no abdominal  tenderness. There is no rebound.   Musculoskeletal:         General: Tenderness present. Normal range of motion.      Cervical back: Normal range of motion and neck supple.      Right knee: Normal.      Left knee: Swelling present. No erythema or ecchymosis. Normal range of motion. Tenderness present. Normal pulse.      Comments: Tenderness and mild swelling noted to L knee. Full 5/5 ROM noted. CMS intact. All other adjacent joints otherwise normal.   No wounds or discoloration noted.      Neurological: She is alert and oriented to person, place, and time. She has normal strength. No sensory deficit. GCS score is 15. GCS eye subscore is 4. GCS verbal subscore is 5. GCS motor subscore is 6.   Skin: Skin is warm and dry. Capillary refill takes less than 2 seconds. No erythema.   Psychiatric: She has a normal mood and affect. Thought content normal.         ED Course   Procedures  Labs Reviewed - No data to display       Imaging Results              X-Ray Knee Complete 4 or More Views Left (In process)                      US Lower Extremity Veins Left (Final result)  Result time 05/23/24 21:37:32      Final result by Anurag Pollard MD (05/23/24 21:37:32)                   Impression:      No evidence of deep venous thrombosis in the left lower extremity.      Electronically signed by: Anurag Pollard MD  Date:    05/23/2024  Time:    21:37               Narrative:    EXAMINATION:  US LOWER EXTREMITY VEINS LEFT    CLINICAL HISTORY:  Pain in leg, unspecified    TECHNIQUE:  Duplex and color flow Doppler evaluation and graded compression of the left lower extremity veins was performed.    COMPARISON:  None    FINDINGS:  Left thigh veins: The common femoral, femoral, popliteal, upper greater saphenous, and deep femoral veins are patent and free of thrombus. The veins are normally compressible and have normal phasic flow and augmentation response.    Left calf veins: The visualized calf veins are patent.    Contralateral CFV:  The contralateral (right) common femoral vein is patent and free of thrombus.    Miscellaneous: None                                       Medications - No data to display  Medical Decision Making  Patient awake, alert, has non-labored breathing, and follows commands appropriately. Arrived to ED due to atraumatic L knee pain that began today. Also c/o L knee swelling. Denies injury/trauma. Afebrile. NAD Noted.           Differential Diagnosis: Knee Pain, Joint Effusion, DVT     Amount and/or Complexity of Data Reviewed  Radiology: ordered and independent interpretation performed. Decision-making details documented in ED Course.     Details: US- No evidence of deep venous thrombosis in the left lower extremity. XR- NAF. Informed patient of results and informed her that radiology will read imaging as well.   Discussion of management or test interpretation with external provider(s): Patient ambulatory, has full 5/5 ROM to L knee, imaging unremarkable, and no signs of infection noted. Ace wrap applied in ER and prescribed NSAIDs. Instructed patient to f/u with PCP for further evaluation and treatment. Discussed plan of care and interventions with patient. Agreed to and aware of plan of care. Comfortable being discharged home. Patient discharged home. Patient denies new or additional complaints; no further tests indicated at this time. Verbalized understanding of instructions. No emergent or apparent distress noted prior to discharge. To follow up with PCP in 1 week as needed. Strict ER return precautions given.       Risk  OTC drugs.  Prescription drug management.               ED Course as of 05/23/24 2156   Thu May 23, 2024   2142  Lower Extremity Veins Left  No evidence of deep venous thrombosis in the left lower extremity. [JA]      ED Course User Index  [JA] Keshia Hackett, NP                           Clinical Impression:  Final diagnoses:  [M25.569] Knee pain  [M79.606] Leg pain          ED Disposition  Condition    Discharge Stable          ED Prescriptions       Medication Sig Dispense Start Date End Date Auth. Provider    diclofenac (VOLTAREN) 50 MG EC tablet Take 1 tablet (50 mg total) by mouth 2 (two) times daily as needed (Take as needed twice a day with food for pain.). 20 tablet 5/23/2024 -- Keshia Hackett, NP          Follow-up Information       Follow up With Specialties Details Why Contact Info    Shira Funk NP Family Medicine Call in 1 week If symptoms worsen, As needed [address] [facility] [address]  [phone]      [facility] - Emergency Dept Emergency Medicine Go to  If symptoms worsen, As needed [address] [facility] [id number] [phone]             Keshia Hackett, NP  05/23/24 2134

## 2024-05-24 NOTE — DISCHARGE INSTRUCTIONS
Thanks for letting us take care of you today!  It is our goal to give you courteous care and to keep you comfortable and informed, if you have any questions before you leave I will be happy to try and answer them.    Here is some advice after your visit:      Your visit in the emergency department is NOT definitive care - please follow-up with your primary care doctor and/or specialist within 1 week.  Please return if you have any worsening in your condition or if you have any other concerns.    If you had radiology exams like an XRAY or CT in the emergency Department the interpreation on them may be preliminary - there may be less time sensitive findings on the reports please obtain these reports within 24 hours from the hospital or by using your out on your mobile phone to access records.  Bring these to your primary care doctor and/or specialist for further review of incidental findings.    You have been prescribed Diclofenac for pain. This is an Non-Steroidal Anti-Inflammatory (NSAID) Medication. Please do not take any additional NSAIDs while you are taking this medication including (Advil, Aleve, Motrin, Ibuprofen, Mobic\meloxicam, Naprosyn, Toradol, etc.). Please stop taking this medication if you experience: weakness, itching, yellow skin or eyes, joint pains, vomiting blood, blood or black stools, unusual weight gain, or swelling in your arms, legs, hands, or feet.

## 2024-05-31 ENCOUNTER — HOSPITAL ENCOUNTER (EMERGENCY)
Facility: HOSPITAL | Age: 54
Discharge: HOME | End: 2024-05-31
Attending: INTERNAL MEDICINE
Payer: MEDICAID

## 2024-05-31 VITALS
WEIGHT: 173.75 LBS | BODY MASS INDEX: 31.97 KG/M2 | SYSTOLIC BLOOD PRESSURE: 134 MMHG | HEART RATE: 74 BPM | OXYGEN SATURATION: 98 % | RESPIRATION RATE: 16 BRPM | TEMPERATURE: 98 F | DIASTOLIC BLOOD PRESSURE: 79 MMHG | HEIGHT: 62 IN

## 2024-05-31 DIAGNOSIS — M25.562 ACUTE PAIN OF LEFT KNEE: Primary | ICD-10-CM

## 2024-05-31 PROCEDURE — 99281 EMR DPT VST MAYX REQ PHY/QHP: CPT

## 2024-05-31 NOTE — ED PROVIDER NOTES
Encounter Date: 2024       History     Chief Complaint   Patient presents with    Knee Pain     Left knee pain and swelling x 6 days, denies injury     The patient presents with knee pain and swelling. The onset was 8 days ago.  The course/duration of symptoms is constant. Type of injury: none and none known. Location: left knee. The character of symptoms is pain and swelling.  The degree at present is moderate. There are exacerbating factors including movement, weight bearing and walking.  The relieving factor is rest. Risk factors consist of none. Prior episodes: occasional. Therapy today: none. Associated symptoms: none. She was seen at [facility] on  with negative xray and ultrasound. She is prescribed diclofenac for pain. She is here requesting second opinion. She also has physical therapy starting next week.      Review of patient's allergies indicates:  No Known Allergies  Past Medical History:   Diagnosis Date    Abnormal Pap smear of cervix     Anemia     Diabetes mellitus     Dyslipidemia, goal LDL below 70 2023    GERD (gastroesophageal reflux disease)     Hypertension     Personal history of colonic polyps 2022     Past Surgical History:   Procedure Laterality Date     SECTION      4 total    COLONOSCOPY  2022    TUBAL LIGATION       Family History   Problem Relation Name Age of Onset    Hypertension Mother      Diabetes Mother      Hypertension Father      Heart disease Sister      Hypertension Sister      Stroke Sister      Cancer Maternal Grandmother      Stomach cancer Maternal Aunt      Colon cancer Maternal Uncle       Social History     Tobacco Use    Smoking status: Former     Current packs/day: 0.00     Average packs/day: 1 pack/day for 5.0 years (5.0 ttl pk-yrs)     Types: Cigarettes     Start date:      Quit date: 2019     Years since quittin.4    Smokeless tobacco: Never   Substance Use Topics    Alcohol use: Never    Drug use: Not Currently     Types:  Cocaine     Comment: 1995     Review of Systems   Constitutional:  Negative for fever.   HENT:  Negative for sore throat.    Respiratory:  Negative for shortness of breath.    Cardiovascular:  Negative for chest pain.   Gastrointestinal:  Negative for nausea.   Genitourinary:  Negative for dysuria.   Musculoskeletal:  Positive for arthralgias. Negative for back pain.   Skin:  Negative for rash.   Neurological:  Negative for weakness.   Hematological:  Does not bruise/bleed easily.   All other systems reviewed and are negative.      Physical Exam     Initial Vitals [05/31/24 1730]   BP Pulse Resp Temp SpO2   134/79 74 16 97.7 °F (36.5 °C) 98 %      MAP       --         Physical Exam    Nursing note and vitals reviewed.  Constitutional: She appears well-developed and well-nourished.   HENT:   Head: Normocephalic and atraumatic.   Neck: Neck supple.   Normal range of motion.  Cardiovascular:  Normal rate, regular rhythm, normal heart sounds and intact distal pulses.           Pulmonary/Chest: Effort normal and breath sounds normal.   Abdominal: Abdomen is soft and flat. Bowel sounds are normal. There is no abdominal tenderness.   Musculoskeletal:         General: Normal range of motion.      Cervical back: Normal range of motion and neck supple.      Comments: Left Knee: mild ttp without swelling, FROM, good distal pulses, NVI, no calf ttp or swelling      Neurological: She is alert. She has normal strength.   Skin: Skin is warm and dry.   Psychiatric: She has a normal mood and affect.         ED Course   Procedures  Labs Reviewed - No data to display       Imaging Results    None          Medications - No data to display  Medical Decision Making  The patient presents with knee pain and swelling. The onset was 8 days ago.  The course/duration of symptoms is constant. Type of injury: none and none known. Location: left knee. The character of symptoms is pain and swelling.  The degree at present is moderate. There are  exacerbating factors including movement, weight bearing and walking.  The relieving factor is rest. Risk factors consist of none. Prior episodes: occasional. Therapy today: none. Associated symptoms: none. She was seen at [facility] on 5/23 with negative xray and ultrasound. She is prescribed diclofenac for pain. She is here requesting second opinion. She also has physical therapy starting next week.    She will continue presently prescribed diclofenac for pain. 5:48 PM DISPOSITION: The patient is resting comfortably in no acute distress.  She is hemodynamically stable and is without objective evidence for acute process requiring urgent intervention or hospitalization. I provided counseling to patient with regard to condition, the treatment plan, specific conditions for return, and the importance of follow up. Detailed written and verbal instructions provided to patient and she expressed a verbal understanding of the discharge instructions and overall management plan. Reiterated the importance of medication administration and safety and advised patient to follow up with primary care provider in 3-5 days or sooner if needed.  Answered questions at this time. The patient is stable for discharge.       Amount and/or Complexity of Data Reviewed  External Data Reviewed: radiology.     Details: Negative xray left knee and negative ultrasound of LLE from 5/23 at [facility]      Additional MDM:   Differential Diagnosis:   At this time differential diagnosis is but not limited to fracture, sprain, contusion, arthritis              ED Course as of 05/31/24 1749   Fri May 31, 2024   1747 Given strict ED return precautions. I have spoken with the patient and/or caregivers. I have explained the patient's condition, diagnoses and treatment plan based on the information available to me at this time. I have answered the patient's and/or caregiver's questions and addressed any concerns. The patient and/or caregivers have as good an  understanding of the patient's diagnosis, condition and treatment plan as can be expected at this point. The vital signs have been stable. The patient's condition is stable and appropriate for discharge from the emergency department.      The patient will pursue further outpatient evaluation with the primary care physician or other designated or consulting physician as outlined in the discharge instructions. The patient and/or caregivers are agreeable to this plan of care and follow-up instructions have been explained in detail. The patient and/or caregivers have received these instructions in written format and have expressed an understanding of the discharge instructions. The patient and/or caregivers are aware that any significant change in condition or worsening of symptoms should prompt an immediate return to this or the closest emergency department or a call to 911.      [RB]      ED Course User Index  [RB] Zay Morales ACNP                           Clinical Impression:  Final diagnoses:  [M25.562] Acute pain of left knee (Primary)          ED Disposition Condition    Discharge Stable          ED Prescriptions    None       Follow-up Information       Follow up With Specialties Details Why Contact Info    Shira Funk, NP Family Medicine In 3 days  [address] [address]  [phone]      [facility] - Emergency Dept Emergency Medicine  If symptoms worsen [address] [facility] [id number] [phone]             Zay Morales ACNP  05/31/24 [id number]

## 2024-06-18 ENCOUNTER — LAB VISIT (OUTPATIENT)
Dept: LAB | Facility: HOSPITAL | Age: 54
End: 2024-06-18
Attending: NURSE PRACTITIONER
Payer: MEDICAID

## 2024-06-18 DIAGNOSIS — E11.9 TYPE 2 DIABETES MELLITUS WITHOUT COMPLICATION, WITHOUT LONG-TERM CURRENT USE OF INSULIN: ICD-10-CM

## 2024-06-18 DIAGNOSIS — I10 PRIMARY HYPERTENSION: ICD-10-CM

## 2024-06-18 DIAGNOSIS — E78.5 DYSLIPIDEMIA, GOAL LDL BELOW 70: ICD-10-CM

## 2024-06-18 LAB
ALBUMIN SERPL-MCNC: 3.8 G/DL (ref 3.5–5)
ALBUMIN/GLOB SERPL: 1 RATIO (ref 1.1–2)
ALP SERPL-CCNC: 70 UNIT/L (ref 40–150)
ALT SERPL-CCNC: 18 UNIT/L (ref 0–55)
ANION GAP SERPL CALC-SCNC: 6 MEQ/L
AST SERPL-CCNC: 18 UNIT/L (ref 5–34)
BASOPHILS # BLD AUTO: 0.04 X10(3)/MCL
BASOPHILS NFR BLD AUTO: 0.8 %
BILIRUB SERPL-MCNC: 0.4 MG/DL
BUN SERPL-MCNC: 12.3 MG/DL (ref 9.8–20.1)
CALCIUM SERPL-MCNC: 10.4 MG/DL (ref 8.4–10.2)
CHLORIDE SERPL-SCNC: 105 MMOL/L (ref 98–107)
CHOLEST SERPL-MCNC: 182 MG/DL
CHOLEST/HDLC SERPL: 4 {RATIO} (ref 0–5)
CO2 SERPL-SCNC: 29 MMOL/L (ref 22–29)
CREAT SERPL-MCNC: 0.8 MG/DL (ref 0.55–1.02)
CREAT/UREA NIT SERPL: 15
EOSINOPHIL # BLD AUTO: 0.21 X10(3)/MCL (ref 0–0.9)
EOSINOPHIL NFR BLD AUTO: 4 %
ERYTHROCYTE [DISTWIDTH] IN BLOOD BY AUTOMATED COUNT: 14.6 % (ref 11.5–17)
EST. AVERAGE GLUCOSE BLD GHB EST-MCNC: 131.2 MG/DL
GFR SERPLBLD CREATININE-BSD FMLA CKD-EPI: >60 ML/MIN/1.73/M2
GLOBULIN SER-MCNC: 3.8 GM/DL (ref 2.4–3.5)
GLUCOSE SERPL-MCNC: 101 MG/DL (ref 74–100)
HBA1C MFR BLD: 6.2 %
HCT VFR BLD AUTO: 36.9 % (ref 37–47)
HDLC SERPL-MCNC: 45 MG/DL (ref 35–60)
HGB BLD-MCNC: 12.1 G/DL (ref 12–16)
IMM GRANULOCYTES # BLD AUTO: 0.01 X10(3)/MCL (ref 0–0.04)
IMM GRANULOCYTES NFR BLD AUTO: 0.2 %
LDLC SERPL CALC-MCNC: 112 MG/DL (ref 50–140)
LYMPHOCYTES # BLD AUTO: 2.08 X10(3)/MCL (ref 0.6–4.6)
LYMPHOCYTES NFR BLD AUTO: 39.7 %
MCH RBC QN AUTO: 26.5 PG (ref 27–31)
MCHC RBC AUTO-ENTMCNC: 32.8 G/DL (ref 33–36)
MCV RBC AUTO: 80.9 FL (ref 80–94)
MONOCYTES # BLD AUTO: 0.52 X10(3)/MCL (ref 0.1–1.3)
MONOCYTES NFR BLD AUTO: 9.9 %
NEUTROPHILS # BLD AUTO: 2.38 X10(3)/MCL (ref 2.1–9.2)
NEUTROPHILS NFR BLD AUTO: 45.4 %
NRBC BLD AUTO-RTO: 0 %
PLATELET # BLD AUTO: 378 X10(3)/MCL (ref 130–400)
PMV BLD AUTO: 9.5 FL (ref 7.4–10.4)
POTASSIUM SERPL-SCNC: 3.8 MMOL/L (ref 3.5–5.1)
PROT SERPL-MCNC: 7.6 GM/DL (ref 6.4–8.3)
RBC # BLD AUTO: 4.56 X10(6)/MCL (ref 4.2–5.4)
SODIUM SERPL-SCNC: 140 MMOL/L (ref 136–145)
TRIGL SERPL-MCNC: 127 MG/DL (ref 37–140)
VLDLC SERPL CALC-MCNC: 25 MG/DL
WBC # BLD AUTO: 5.24 X10(3)/MCL (ref 4.5–11.5)

## 2024-06-18 PROCEDURE — 80061 LIPID PANEL: CPT

## 2024-06-18 PROCEDURE — 36415 COLL VENOUS BLD VENIPUNCTURE: CPT

## 2024-06-18 PROCEDURE — 85025 COMPLETE CBC W/AUTO DIFF WBC: CPT

## 2024-06-18 PROCEDURE — 80053 COMPREHEN METABOLIC PANEL: CPT

## 2024-06-18 PROCEDURE — 83036 HEMOGLOBIN GLYCOSYLATED A1C: CPT

## 2024-06-19 ENCOUNTER — OFFICE VISIT (OUTPATIENT)
Dept: INTERNAL MEDICINE | Facility: CLINIC | Age: 54
End: 2024-06-19
Payer: MEDICAID

## 2024-06-19 DIAGNOSIS — M25.562 PAIN AND SWELLING OF LEFT KNEE: ICD-10-CM

## 2024-06-19 DIAGNOSIS — M25.462 PAIN AND SWELLING OF LEFT KNEE: ICD-10-CM

## 2024-06-19 DIAGNOSIS — E11.9 TYPE 2 DIABETES MELLITUS WITHOUT COMPLICATION, WITHOUT LONG-TERM CURRENT USE OF INSULIN: Primary | ICD-10-CM

## 2024-06-19 DIAGNOSIS — I10 PRIMARY HYPERTENSION: ICD-10-CM

## 2024-06-19 DIAGNOSIS — F41.1 GENERALIZED ANXIETY DISORDER: ICD-10-CM

## 2024-06-19 DIAGNOSIS — M47.816 LUMBAR SPONDYLOSIS: ICD-10-CM

## 2024-06-19 DIAGNOSIS — M51.36 LUMBAR DEGENERATIVE DISC DISEASE: ICD-10-CM

## 2024-06-19 PROCEDURE — 1159F MED LIST DOCD IN RCRD: CPT | Mod: CPTII,95,, | Performed by: NURSE PRACTITIONER

## 2024-06-19 PROCEDURE — 3044F HG A1C LEVEL LT 7.0%: CPT | Mod: CPTII,95,, | Performed by: NURSE PRACTITIONER

## 2024-06-19 PROCEDURE — 99214 OFFICE O/P EST MOD 30 MIN: CPT | Mod: 95,,, | Performed by: NURSE PRACTITIONER

## 2024-06-19 PROCEDURE — 1160F RVW MEDS BY RX/DR IN RCRD: CPT | Mod: CPTII,95,, | Performed by: NURSE PRACTITIONER

## 2024-06-19 RX ORDER — DICLOFENAC SODIUM 50 MG/1
50 TABLET, DELAYED RELEASE ORAL 2 TIMES DAILY PRN
Qty: 60 TABLET | Refills: 2 | Status: SHIPPED | OUTPATIENT
Start: 2024-06-19

## 2024-06-19 RX ORDER — HYDROXYZINE PAMOATE 25 MG/1
25 CAPSULE ORAL 3 TIMES DAILY PRN
Qty: 90 CAPSULE | Refills: 5 | Status: SHIPPED | OUTPATIENT
Start: 2024-06-19

## 2024-06-19 RX ORDER — METFORMIN HYDROCHLORIDE 500 MG/1
500 TABLET ORAL
Qty: 90 TABLET | Refills: 2 | Status: SHIPPED | OUTPATIENT
Start: 2024-06-19

## 2024-06-19 RX ORDER — LANCETS 33 GAUGE
EACH MISCELLANEOUS
Qty: 100 EACH | Refills: 3 | Status: SHIPPED | OUTPATIENT
Start: 2024-06-19

## 2024-06-19 RX ORDER — AMLODIPINE BESYLATE 5 MG/1
5 TABLET ORAL DAILY
Qty: 90 TABLET | Refills: 2 | Status: SHIPPED | OUTPATIENT
Start: 2024-06-19

## 2024-06-19 NOTE — ASSESSMENT & PLAN NOTE
Lab Results   Component Value Date    HGBA1C 6.2 06/18/2024       Hypoglycemia episodes: denies  Microalbumin:   Lab Results   Component Value Date    MICALBCREAT  12/11/2023      Comment:      Unable to calculate       Educated on ADA diet: eliminate/decrease high carbohydrate foods (rice, pasta, bread, potatoes (french fries, chips), candy, sweets, fruit juices, canned fruit, junk food, crackers, carbonated beverages)  Educated on health benefits of at least 5 days/ week of 30 minutes moderate intensity exercise (brisk walking) and 2 or more days/ week of muscle strength activities  Avoid alcohol or tobacco use  Eye exam: 1/23/24 no DR ; est with [facility] eye clinic  Foot exam: 12/12/23  Continue Metformin 500 mg daily

## 2024-06-19 NOTE — PROGRESS NOTES
Answers submitted by the patient for this visit:  Review of Systems Questionnaire (Submitted on 6/19/2024)  activity change: Yes  unexpected weight change: No  neck pain: No  hearing loss: No  rhinorrhea: No  trouble swallowing: No  eye discharge: No  visual disturbance: No  chest tightness: No  wheezing: No  chest pain: No  palpitations: No  blood in stool: No  constipation: No  vomiting: No  diarrhea: No  polydipsia: No  polyuria: No  difficulty urinating: No  hematuria: No  menstrual problem: No  dysuria: No  joint swelling: No  arthralgias: No  headaches: No  weakness: No  confusion: No  dysphoric mood: No  Audio Only Telehealth Visit     The patient location is: work  The chief complaint leading to consultation is: results  Visit type: Virtual visit with audio only (telephone)  Total time spent with patient: 21 minutes      The reason for the audio only service rather than synchronous audio and video virtual visit was related to technical difficulties or patient preference/necessity.     Each patient to whom I provide medical services by telemedicine is:  (1) informed of the relationship between the physician and patient and the respective role of any other health care provider with respect to management of the patient; and (2) notified that they may decline to receive medical services by telemedicine and may withdraw from such care at any time. Patient verbally consented to receive this service via voice-only telephone call.       HPI: 52 yo AAF for 6 mo follow up/ lab results. PMH tobacco abuse. Active diagnosis include HTN, DM, anemia, lumbago, lumbar DDD, OA hip, anxiety. Labs completed and reviewed. A1c 6.2%. Ca mildly elevated 10.4. She states she had flare up of GERD and was unable to get PPI at the time therefore was using Tums. C/o left knee pain and swelling, mostly swelling. Improved in the morning but after walking/ standing for a few hours, swelling occurs. Pain with swelling due to decreased ROM  s/t swelling. Had XR and US completed in ER. Seen in ER a few times for same. Agrees to PT and currently engaging in PT at [facility] in [place] for back. Diclofenac minimally effective but states okay to continue for now and will notify provider if symptoms worsen or no improvement after PT. Needs refill of lancets. Otherwise no other concerns or complaints stated.     Other providers:  [facility]hthal[facility]  Dr. Cid- Neurosurgery at [place].   Dr. Fischer- GI  [facility] GYN  Dr. Barton- referred x 2 for pain mgmt     Medication List with Changes/Refills   New Medications    DICLOFENAC (VOLTAREN) 50 MG EC TABLET    Take 1 tablet (50 mg total) by mouth 2 (two) times daily as needed (pain). Take with food/ milk. Avoid other NSAIDs.   Current Medications    BLOOD-GLUCOSE METER KIT    To check BG once daily, to use with insurance preferred meter    CICLOPIROX (PENLAC) 8 % SOLN    Apply topically nightly.    DICLOFENAC SODIUM (VOLTAREN) 1 % GEL    Apply 2 g topically 4 (four) times daily.    FAMOTIDINE (PEPCID) 40 MG TABLET    Take 40 mg by mouth.    FERROUS SULFATE 325 (65 FE) MG EC TABLET    Take 1 tablet (325 mg total) by mouth every Mon, Wed, Fri.    IBUPROFEN (ADVIL,MOTRIN) 800 MG TABLET    Take 1 tablet (800 mg total) by mouth 3 (three) times daily as needed for Pain (back pain). Take with food/ milk. Avoid other NSAIDs.    ONETOUCH VERIO FLEX METER MISC    USE TO CHECK BLOOD SUGAR ONCE DAILY    ONETOUCH VERIO TEST STRIPS STRP    USE TO TEST BLOOD SUGAR ONCE DAILY    PANTOPRAZOLE (PROTONIX) 40 MG TABLET    Take 1 tablet (40 mg total) by mouth once daily.   Changed and/or Refilled Medications    Modified Medication Previous Medication    AMLODIPINE (NORVASC) 5 MG TABLET amLODIPine (NORVASC) 5 MG tablet       Take 1 tablet (5 mg total) by mouth once daily.    Take 1 tablet (5 mg total) by mouth once daily.    HYDROXYZINE PAMOATE (VISTARIL) 25 MG CAP hydrOXYzine pamoate (VISTARIL) 25 MG Cap       Take 1 capsule (25 mg  total) by mouth 3 (three) times daily as needed (anxiety).    Take 1 capsule (25 mg total) by mouth 3 (three) times daily as needed (anxiety).    METFORMIN (GLUCOPHAGE) 500 MG TABLET metFORMIN (GLUCOPHAGE) 500 MG tablet       Take 1 tablet (500 mg total) by mouth daily with breakfast.    Take 1 tablet (500 mg total) by mouth daily with breakfast.    ONETOUCH DELICA PLUS LANCET 33 GAUGE MISC ONETOUCH DELICA PLUS LANCET 33 gauge Misc       One Touch Delica Plus Lancet 33 gauge to use once daily for blood sugar checks.    USE TO TEST BLOOD SUGAR ONCE DAILY   Discontinued Medications    CETIRIZINE (ZYRTEC) 10 MG TABLET    Take 1 tablet (10 mg total) by mouth once daily.    CHOLECALCIFEROL, VITAMIN D3, 1,250 MCG (50,000 UNIT) CAPSULE    Take 1 capsule (50,000 Units total) by mouth every 7 days.    DICLOFENAC (VOLTAREN) 50 MG EC TABLET    Take 1 tablet (50 mg total) by mouth 2 (two) times daily as needed (Take as needed twice a day with food for pain.).    FLUTICASONE PROPIONATE (FLONASE) 50 MCG/ACTUATION NASAL SPRAY    1 spray (50 mcg total) by Each Nostril route 2 (two) times daily as needed for Rhinitis.    GABAPENTIN (NEURONTIN) 100 MG CAPSULE    Take 1 capsule (100 mg total) by mouth 3 (three) times daily.    PROMETHAZINE-DEXTROMETHORPHAN (PROMETHAZINE-DM) 6.25-15 MG/5 ML SYRP    Take 5 mLs by mouth every 6 to 8 hours as needed (cough). May cause sedation.  Do not drive or operate heavy machinery.        Assessment and plan:    Problem List Items Addressed This Visit          Neuro    Lumbar degenerative disc disease    Overview     MRI lumbar spine 1/19/22 FINDINGS:  For the purpose of this report, the most inferior well  developed intervertebral disc space is presumed to represent L5-S1.  Lumbar vertebrae stature is maintained and alignment is unremarkable.  No acute marrow edematous signal. The visualized thoracic cord is  unremarkable. The conus medullaris terminates at T12. The L3-L4, L4-L5  and L5-S1 discs are  partially desiccated. Disc segmental analysis is  given below:     At L1-L2, disc is unremarkable. Central canal is not stenosed. There  are no narrowings of the neuroforamen.     At L2-L3, disc is unremarkable. Bilateral facets arthropathy. There is  no significant central canal stenosis. There are no narrowings of the  neuroforamen.     At L3-L4, disc height is preserved. Bilateral degenerative hypertrophy  of the facet joints and mild ligamentum flavum thickening. There is no  significant central canal stenosis. There are no narrowings of the  neuroforamen.     At L4-L5, there is bulging of annulus fibrosis which flattens the  ventral thecal sac. There is bilateral symmetrical thickening of  ligamentum flavum and facets arthropathy. These findings combine to  cause mild central canal stenosis. There are no narrowings of the  neuroforamen.     At L5-S1, there is central disc bulge which indents the ventral thecal  sac. Bilateral facets arthropathy. Central canal is not stenosed.  There are no narrowings of the neuroforamen     IMPRESSION:     Lumbar degenerative disc disease and spondylosis level by level  discussed above.  Evaluated by Neurosurgeon in [place] at [facility] last August 2022- refer to Dr. Renay Cid note           Current Assessment & Plan     Reports currently engaged in PT; continue and f/u with provider as scheduled/ recommended.         Lumbar spondylosis    Overview     See LDD.            Psychiatric    Generalized anxiety disorder    Relevant Medications    hydrOXYzine pamoate (VISTARIL) 25 MG Cap       Cardiac/Vascular    Hypertension    Current Assessment & Plan     BP Readings from Last 3 Encounters:   05/31/24 134/79   05/23/24 125/63   01/29/24 125/76     Follow low sodium diet, < 2 gm/day (avoid high salty foods such as processed meats/ sausage/hsieh/ sandwich meat, chips, pickles, cheese, crackers and soft drinks/ electrolyte replacement drinks).  Avoid tobacco/ alcohol  use  Educated on health benefits of at least 5 days/ week of 30 minutes moderate intensity exercise (brisk walking) and 2 or more days/ week of muscle strength activities  Daily ASA 81 mg for CV prevention  Continue amlodipine 5 mg            Relevant Medications    amLODIPine (NORVASC) 5 MG tablet       Endocrine    Type 2 diabetes mellitus without complication, without long-term current use of insulin - Primary    Current Assessment & Plan     Lab Results   Component Value Date    HGBA1C 6.2 06/18/2024       Hypoglycemia episodes: denies  Microalbumin:   Lab Results   Component Value Date    MICALBCREAT  12/11/2023      Comment:      Unable to calculate       Educated on ADA diet: eliminate/decrease high carbohydrate foods (rice, pasta, bread, potatoes (french fries, chips), candy, sweets, fruit juices, canned fruit, junk food, crackers, carbonated beverages)  Educated on health benefits of at least 5 days/ week of 30 minutes moderate intensity exercise (brisk walking) and 2 or more days/ week of muscle strength activities  Avoid alcohol or tobacco use  Eye exam: 1/23/24 no DR ; est with [facility] eye clinic  Foot exam: 12/12/23  Continue Metformin 500 mg daily                Relevant Medications    ONETOUCH DELICA PLUS LANCET 33 gauge Misc    metFORMIN (GLUCOPHAGE) 500 MG tablet       Orthopedic    Pain and swelling of left knee    Current Assessment & Plan     Multiple ER visits with XR left knee without findings noted; negative venous US for DVT.   Continue with swelling and pain associated with swelling after a few hours of standing/ walking  Agrees to PT; referral to [facility] in [place], currently engaging in PT for back at this time  Wants refill of diclofenac 50 mg BID prn pain, avoid other NSAIDs  F/u after PT or sooner if needed/ worsening symptoms         Relevant Medications    diclofenac (VOLTAREN) 50 MG EC tablet    Other Relevant Orders    Ambulatory referral/consult to Physical/Occupational Therapy        Follow up in about 3 months (around 9/19/2024) for left knee pain and swelling (after PT).       This service was not originating from a related E/M service provided within the previous 7 days nor will  to an E/M service or procedure within the next 24 hours or my soonest available appointment.  Prevailing standard of care was able to be met in this audio-only visit.

## 2024-06-19 NOTE — ASSESSMENT & PLAN NOTE
BP Readings from Last 3 Encounters:   05/31/24 134/79   05/23/24 125/63   01/29/24 125/76     Follow low sodium diet, < 2 gm/day (avoid high salty foods such as processed meats/ sausage/hsieh/ sandwich meat, chips, pickles, cheese, crackers and soft drinks/ electrolyte replacement drinks).  Avoid tobacco/ alcohol use  Educated on health benefits of at least 5 days/ week of 30 minutes moderate intensity exercise (brisk walking) and 2 or more days/ week of muscle strength activities  Daily ASA 81 mg for CV prevention  Continue amlodipine 5 mg

## 2024-06-19 NOTE — ASSESSMENT & PLAN NOTE
Multiple ER visits with XR left knee without findings noted; negative venous US for DVT.   Continue with swelling and pain associated with swelling after a few hours of standing/ walking  Agrees to PT; referral to [facility] in [place], currently engaging in PT for back at this time  Wants refill of diclofenac 50 mg BID prn pain, avoid other NSAIDs  F/u after PT or sooner if needed/ worsening symptoms

## 2024-08-13 ENCOUNTER — LAB VISIT (OUTPATIENT)
Dept: LAB | Facility: HOSPITAL | Age: 54
End: 2024-08-13
Attending: INTERNAL MEDICINE
Payer: MEDICAID

## 2024-08-13 DIAGNOSIS — Z86.010 PERSONAL HISTORY OF COLONIC POLYPS: Primary | ICD-10-CM

## 2024-08-13 LAB — HEMOCCULT SP1 STL QL: NEGATIVE

## 2024-08-13 PROCEDURE — 82270 OCCULT BLOOD FECES: CPT

## 2024-09-20 ENCOUNTER — OFFICE VISIT (OUTPATIENT)
Dept: GYNECOLOGY | Facility: CLINIC | Age: 54
End: 2024-09-20
Payer: MEDICAID

## 2024-09-20 VITALS
DIASTOLIC BLOOD PRESSURE: 79 MMHG | SYSTOLIC BLOOD PRESSURE: 130 MMHG | HEART RATE: 79 BPM | TEMPERATURE: 99 F | HEIGHT: 62 IN | BODY MASS INDEX: 31.9 KG/M2 | OXYGEN SATURATION: 100 % | WEIGHT: 173.38 LBS | RESPIRATION RATE: 20 BRPM

## 2024-09-20 DIAGNOSIS — Z12.4 PAP SMEAR FOR CERVICAL CANCER SCREENING: Primary | ICD-10-CM

## 2024-09-20 DIAGNOSIS — Z12.31 VISIT FOR SCREENING MAMMOGRAM: ICD-10-CM

## 2024-09-20 PROCEDURE — 99214 OFFICE O/P EST MOD 30 MIN: CPT | Mod: PBBFAC | Performed by: NURSE PRACTITIONER

## 2024-09-20 NOTE — PROGRESS NOTES
"  [facility] -  Gynecology / Women's Health Clinic      Subjective:       Patient ID: Tova Stack is a 54 y.o. female.    Chief Complaint:  Gynecologic Exam    History of Present Illness  The patient  here for annual exam. Pt is postmenopausal since 2022. Hx of fibroids. Admits history of abnormal pap with colpo in the past. Last pap -NIL and HPV neg. MG-24 & BIRADS 1. Denies breast or urinary complaints. Denies pelvic pain, abnormal bleeding or discharge. Pt reports Hep B in the past and no concerns today. Denies tobacco use. Dep. screening 0. Colonoscopy in , Hx of polyps. FIT neg in . Denies fly hx of uterine cancer.  MA with ovarian cancer, MU with colon cancer, maternal cousin with breast cancer.    GYN & OB History  No LMP recorded (lmp unknown). Patient is postmenopausal.   Date of Last Pap: 2023    Review of patient's allergies indicates:  No Known Allergies  Past Medical History:   Diagnosis Date    Abnormal Pap smear of cervix     Anemia     Diabetes mellitus     Dyslipidemia, goal LDL below 70 2023    GERD (gastroesophageal reflux disease)     Hypertension     Personal history of colonic polyps 2022     OB History    Para Term  AB Living   6 4           SAB IAB Ectopic Multiple Live Births                  # Outcome Date GA Lbr Ramiro/2nd Weight Sex Type Anes PTL Lv   6             5             4 Para            3 Para            2 Para            1 Para                 Review of Systems  Review of Systems    Negative except for pertinent findings for positives per HPI     Objective:    Physical Exam    /79 (BP Location: Right arm, Patient Position: Sitting, BP Method: Medium (Automatic))   Pulse 79   Temp 98.9 °F (37.2 °C) (Oral)   Resp 20   Ht 5' 2" (1.575 m)   Wt 78.7 kg (173 lb 6.4 oz)   LMP  (LMP Unknown)   SpO2 100%   BMI 31.72 kg/m²   GENERAL: Well-developed female. No acute distress.    SKIN: " Normal to inspection, warm and intact.  BREASTS: No rashes or erythema. No masses, lumps, discharge, tenderness.  VULVA: General appearance normal; external genitalia with no lesions or erythema.  VAGINA: Mucosa/vaginal vault pink, no abnormal discharge or lesions.  CERVIX: Pink, nulliparous appearing os, no erythema or abnormal discharge.  BIMANUAL EXAM: reveals a 10 week-sized uterus. The uterus is non tender. Cesar adnexa reveal no tenderness.  PSYCHIATRIC: Patient is oriented to person, place, and time. Mood and affect are normal.    Assessment:         ICD-10-CM ICD-9-CM   1. Pap smear for cervical cancer screening  Z12.4 V76.2   2. Visit for screening mammogram  Z12.31 V76.12     Plan:   Tova was seen today for gynecologic exam.    Diagnoses and all orders for this visit:    Pap smear for cervical cancer screening  -     Liquid-Based Pap Smear, Screening    Visit for screening mammogram  -     Mammo Digital Screening Bilat w/ Duc; Future    Pap today  MG ordered  Call clinic with any vaginal bleeding which would be abnormal.  Follow up in about 1 year (around 9/20/2025) for annual exam.

## 2024-10-22 ENCOUNTER — OFFICE VISIT (OUTPATIENT)
Dept: OPHTHALMOLOGY | Facility: CLINIC | Age: 54
End: 2024-10-22
Payer: MEDICAID

## 2024-10-22 VITALS — BODY MASS INDEX: 31.93 KG/M2 | WEIGHT: 173.5 LBS | HEIGHT: 62 IN

## 2024-10-22 DIAGNOSIS — E11.9 TYPE 2 DIABETES MELLITUS WITHOUT COMPLICATION, WITHOUT LONG-TERM CURRENT USE OF INSULIN: Primary | ICD-10-CM

## 2024-10-22 DIAGNOSIS — H43.399 VITREOUS FLOATERS, UNSPECIFIED LATERALITY: ICD-10-CM

## 2024-10-22 DIAGNOSIS — H25.813 COMBINED FORMS OF AGE-RELATED CATARACT OF BOTH EYES: ICD-10-CM

## 2024-10-22 PROCEDURE — 92134 CPTRZ OPH DX IMG PST SGM RTA: CPT | Mod: PBBFAC,PN | Performed by: STUDENT IN AN ORGANIZED HEALTH CARE EDUCATION/TRAINING PROGRAM

## 2024-10-22 PROCEDURE — 99213 OFFICE O/P EST LOW 20 MIN: CPT | Mod: PBBFAC,PN,25 | Performed by: STUDENT IN AN ORGANIZED HEALTH CARE EDUCATION/TRAINING PROGRAM

## 2024-10-22 PROCEDURE — 92134 CPTRZ OPH DX IMG PST SGM RTA: CPT | Mod: PBBFAC,PN | Performed by: OPHTHALMOLOGY

## 2024-10-22 NOTE — PROGRESS NOTES
HPI    RTC 6-12 months for cat musa if interested     Last edited by Miriam Villalta on 10/22/2024  9:53 AM.            Assessment /Plan     For exam results, see Encounter Report.    Type 2 diabetes mellitus without complication, without long-term current use of insulin    Vitreous floaters, unspecified laterality    Combined forms of age-related cataract of both eyes      OCT Macula   10/22/24  OD: NFC, no IRF/SRF  OS: NFC, no IRF/SRF   1/23/24  OD: 199, normal foveal contour, no IRF or SRF  OS: 226, normal foveal contour, no IRF or SRF    OCT RNFL 1/23/24  OD: 88, all green  OS: 98, yellow N      1) DM II w/o Retinopathy  - Last A1c 6.2%  - OCT macula and fundus photos done 1/23/24  - Annual DFE due 1/2025    2) Vitreous Syneresis, OU  - Reports episodic flashes of light in temporal visual field, come and go, both eyes, several months  - No floaters  - No holes/tears/detach    3) Combined Cataract, OU  - BCVA 20/30   - Not interested in surgery at this time   - MRX provided 10/22/24    RTC 6 months for DFE, OCT, optos

## 2024-11-11 ENCOUNTER — HOSPITAL ENCOUNTER (EMERGENCY)
Facility: HOSPITAL | Age: 54
Discharge: HOME | End: 2024-11-11
Attending: EMERGENCY MEDICINE
Payer: MEDICAID

## 2024-11-11 VITALS
RESPIRATION RATE: 18 BRPM | OXYGEN SATURATION: 98 % | WEIGHT: 176.38 LBS | HEIGHT: 62 IN | BODY MASS INDEX: 32.46 KG/M2 | DIASTOLIC BLOOD PRESSURE: 77 MMHG | TEMPERATURE: 98 F | HEART RATE: 69 BPM | SYSTOLIC BLOOD PRESSURE: 137 MMHG

## 2024-11-11 DIAGNOSIS — D64.9 CHRONIC ANEMIA: ICD-10-CM

## 2024-11-11 DIAGNOSIS — R20.2 PARESTHESIA OF LOWER LIP: Primary | ICD-10-CM

## 2024-11-11 DIAGNOSIS — Z20.828 EXPOSURE TO HERPES SIMPLEX VIRUS (HSV): ICD-10-CM

## 2024-11-11 DIAGNOSIS — K05.10 GINGIVITIS: ICD-10-CM

## 2024-11-11 LAB
ALBUMIN SERPL-MCNC: 3.7 G/DL (ref 3.5–5)
ALBUMIN/GLOB SERPL: 1 RATIO (ref 1.1–2)
ALP SERPL-CCNC: 76 UNIT/L (ref 40–150)
ALT SERPL-CCNC: 12 UNIT/L (ref 0–55)
ANION GAP SERPL CALC-SCNC: 9 MEQ/L
AST SERPL-CCNC: 16 UNIT/L (ref 5–34)
BASOPHILS # BLD AUTO: 0.03 X10(3)/MCL
BASOPHILS NFR BLD AUTO: 0.6 %
BILIRUB SERPL-MCNC: 0.2 MG/DL
BUN SERPL-MCNC: 13.8 MG/DL (ref 9.8–20.1)
CALCIUM SERPL-MCNC: 9.5 MG/DL (ref 8.4–10.2)
CHLORIDE SERPL-SCNC: 105 MMOL/L (ref 98–107)
CO2 SERPL-SCNC: 26 MMOL/L (ref 22–29)
CREAT SERPL-MCNC: 0.79 MG/DL (ref 0.55–1.02)
CREAT/UREA NIT SERPL: 17
EOSINOPHIL # BLD AUTO: 0.19 X10(3)/MCL (ref 0–0.9)
EOSINOPHIL NFR BLD AUTO: 3.7 %
ERYTHROCYTE [DISTWIDTH] IN BLOOD BY AUTOMATED COUNT: 14.4 % (ref 11.5–17)
GFR SERPLBLD CREATININE-BSD FMLA CKD-EPI: >60 ML/MIN/1.73/M2
GLOBULIN SER-MCNC: 3.7 GM/DL (ref 2.4–3.5)
GLUCOSE SERPL-MCNC: 94 MG/DL (ref 74–100)
HCT VFR BLD AUTO: 35.5 % (ref 37–47)
HGB BLD-MCNC: 11.5 G/DL (ref 12–16)
HOLD SPECIMEN: NORMAL
IMM GRANULOCYTES # BLD AUTO: 0.02 X10(3)/MCL (ref 0–0.04)
IMM GRANULOCYTES NFR BLD AUTO: 0.4 %
LYMPHOCYTES # BLD AUTO: 1.97 X10(3)/MCL (ref 0.6–4.6)
LYMPHOCYTES NFR BLD AUTO: 38.9 %
MCH RBC QN AUTO: 26.4 PG (ref 27–31)
MCHC RBC AUTO-ENTMCNC: 32.4 G/DL (ref 33–36)
MCV RBC AUTO: 81.6 FL (ref 80–94)
MONOCYTES # BLD AUTO: 0.54 X10(3)/MCL (ref 0.1–1.3)
MONOCYTES NFR BLD AUTO: 10.7 %
NEUTROPHILS # BLD AUTO: 2.32 X10(3)/MCL (ref 2.1–9.2)
NEUTROPHILS NFR BLD AUTO: 45.7 %
NRBC BLD AUTO-RTO: 0 %
PLATELET # BLD AUTO: 387 X10(3)/MCL (ref 130–400)
PMV BLD AUTO: 9.7 FL (ref 7.4–10.4)
POTASSIUM SERPL-SCNC: 4 MMOL/L (ref 3.5–5.1)
PROT SERPL-MCNC: 7.4 GM/DL (ref 6.4–8.3)
RBC # BLD AUTO: 4.35 X10(6)/MCL (ref 4.2–5.4)
SODIUM SERPL-SCNC: 140 MMOL/L (ref 136–145)
WBC # BLD AUTO: 5.07 X10(3)/MCL (ref 4.5–11.5)

## 2024-11-11 PROCEDURE — 85025 COMPLETE CBC W/AUTO DIFF WBC: CPT

## 2024-11-11 PROCEDURE — 80053 COMPREHEN METABOLIC PANEL: CPT

## 2024-11-11 PROCEDURE — 99285 EMERGENCY DEPT VISIT HI MDM: CPT | Mod: 25

## 2024-11-11 RX ORDER — VALACYCLOVIR HYDROCHLORIDE 1 G/1
2000 TABLET, FILM COATED ORAL 2 TIMES DAILY
Qty: 4 TABLET | Refills: 0 | Status: SHIPPED | OUTPATIENT
Start: 2024-11-11 | End: 2024-11-12

## 2024-11-11 RX ORDER — CHLORHEXIDINE GLUCONATE ORAL RINSE 1.2 MG/ML
15 SOLUTION DENTAL 2 TIMES DAILY
Qty: 300 ML | Refills: 0 | Status: SHIPPED | OUTPATIENT
Start: 2024-11-11 | End: 2024-11-21

## 2024-11-11 NOTE — Clinical Note
"Tova Allen" Seda was seen and treated in our emergency department on 11/11/2024.  She may return to work on 11/12/2024.       If you have any questions or concerns, please don't hesitate to call.      Judith Johansen, MANI"

## 2024-12-30 NOTE — TELEPHONE ENCOUNTER
Spoke with pt and informed Dr. Cid does not prescribe pain medications before surgery. Informed pt to contact her PCP about the Rx she was prescribed for pain no longer working. Pt expressed understanding.    ----- Message from Gabino Malave sent at 7/29/2022  9:00 AM CDT -----  Regarding: needs an Rx for shooting pain(foot)      The Pt states that she is having shooting pains down from the left hip to the left foot and her back too and that her pain level is a 12.    Please contact the Pt as she states that the Rx she was given for pain isn't working.    Pharmacy-[facility] [address] [place] [address]   Phone: [phone]  Fax:  [phone]    Please contact the Pt to discuss as she states that it slipped her mind when she was speaking to you(3 days ago) about surgery but had the pain since then.    # [phone]            pmd

## 2025-01-13 ENCOUNTER — HOSPITAL ENCOUNTER (OUTPATIENT)
Dept: RADIOLOGY | Facility: HOSPITAL | Age: 55
Discharge: HOME | End: 2025-01-13
Attending: NURSE PRACTITIONER
Payer: MEDICAID

## 2025-01-13 ENCOUNTER — OFFICE VISIT (OUTPATIENT)
Dept: INTERNAL MEDICINE | Facility: CLINIC | Age: 55
End: 2025-01-13
Payer: MEDICAID

## 2025-01-13 VITALS
TEMPERATURE: 98 F | WEIGHT: 175.19 LBS | BODY MASS INDEX: 32.24 KG/M2 | OXYGEN SATURATION: 97 % | HEIGHT: 62 IN | DIASTOLIC BLOOD PRESSURE: 77 MMHG | HEART RATE: 83 BPM | RESPIRATION RATE: 20 BRPM | SYSTOLIC BLOOD PRESSURE: 120 MMHG

## 2025-01-13 DIAGNOSIS — G89.29 CHRONIC PAIN OF LEFT KNEE: ICD-10-CM

## 2025-01-13 DIAGNOSIS — M25.552 BILATERAL HIP PAIN: ICD-10-CM

## 2025-01-13 DIAGNOSIS — M25.551 BILATERAL HIP PAIN: ICD-10-CM

## 2025-01-13 DIAGNOSIS — M51.360 DEGENERATION OF INTERVERTEBRAL DISC OF LUMBAR REGION WITH DISCOGENIC BACK PAIN: ICD-10-CM

## 2025-01-13 DIAGNOSIS — R06.89 GASPING FOR BREATH: ICD-10-CM

## 2025-01-13 DIAGNOSIS — D64.9 ANEMIA, UNSPECIFIED TYPE: ICD-10-CM

## 2025-01-13 DIAGNOSIS — M47.816 LUMBAR SPONDYLOSIS: ICD-10-CM

## 2025-01-13 DIAGNOSIS — F41.1 GENERALIZED ANXIETY DISORDER: ICD-10-CM

## 2025-01-13 DIAGNOSIS — E55.9 VITAMIN D DEFICIENCY: ICD-10-CM

## 2025-01-13 DIAGNOSIS — I10 PRIMARY HYPERTENSION: ICD-10-CM

## 2025-01-13 DIAGNOSIS — E78.5 DYSLIPIDEMIA, GOAL LDL BELOW 70: ICD-10-CM

## 2025-01-13 DIAGNOSIS — G47.00 INSOMNIA, UNSPECIFIED TYPE: ICD-10-CM

## 2025-01-13 DIAGNOSIS — M25.562 CHRONIC PAIN OF LEFT KNEE: ICD-10-CM

## 2025-01-13 DIAGNOSIS — E11.9 TYPE 2 DIABETES MELLITUS WITHOUT COMPLICATION, WITHOUT LONG-TERM CURRENT USE OF INSULIN: ICD-10-CM

## 2025-01-13 DIAGNOSIS — F32.A DEPRESSION, UNSPECIFIED DEPRESSION TYPE: Primary | ICD-10-CM

## 2025-01-13 PROCEDURE — 73502 X-RAY EXAM HIP UNI 2-3 VIEWS: CPT | Mod: TC,LT

## 2025-01-13 PROCEDURE — 3008F BODY MASS INDEX DOCD: CPT | Mod: CPTII,,, | Performed by: NURSE PRACTITIONER

## 2025-01-13 PROCEDURE — 3074F SYST BP LT 130 MM HG: CPT | Mod: CPTII,,, | Performed by: NURSE PRACTITIONER

## 2025-01-13 PROCEDURE — 73502 X-RAY EXAM HIP UNI 2-3 VIEWS: CPT | Mod: TC,RT

## 2025-01-13 PROCEDURE — 72110 X-RAY EXAM L-2 SPINE 4/>VWS: CPT | Mod: TC

## 2025-01-13 PROCEDURE — 99215 OFFICE O/P EST HI 40 MIN: CPT | Mod: PBBFAC,25 | Performed by: NURSE PRACTITIONER

## 2025-01-13 PROCEDURE — 99214 OFFICE O/P EST MOD 30 MIN: CPT | Mod: S$PBB,,, | Performed by: NURSE PRACTITIONER

## 2025-01-13 PROCEDURE — 1159F MED LIST DOCD IN RCRD: CPT | Mod: CPTII,,, | Performed by: NURSE PRACTITIONER

## 2025-01-13 PROCEDURE — 3078F DIAST BP <80 MM HG: CPT | Mod: CPTII,,, | Performed by: NURSE PRACTITIONER

## 2025-01-13 RX ORDER — HYDROXYZINE PAMOATE 25 MG/1
25 CAPSULE ORAL 3 TIMES DAILY PRN
Qty: 90 CAPSULE | Refills: 5 | Status: SHIPPED | OUTPATIENT
Start: 2025-01-13

## 2025-01-13 RX ORDER — AMLODIPINE BESYLATE 5 MG/1
5 TABLET ORAL DAILY
Qty: 90 TABLET | Refills: 2 | Status: SHIPPED | OUTPATIENT
Start: 2025-01-13

## 2025-01-13 RX ORDER — METFORMIN HYDROCHLORIDE 500 MG/1
500 TABLET ORAL
Qty: 90 TABLET | Refills: 2 | Status: SHIPPED | OUTPATIENT
Start: 2025-01-13

## 2025-01-13 RX ORDER — FERROUS SULFATE 325(65) MG
TABLET, DELAYED RELEASE (ENTERIC COATED) ORAL
Qty: 39 TABLET | Refills: 3 | Status: SHIPPED | OUTPATIENT
Start: 2025-01-13

## 2025-01-13 RX ORDER — SERTRALINE HYDROCHLORIDE 50 MG/1
50 TABLET, FILM COATED ORAL DAILY
Qty: 30 TABLET | Refills: 1 | Status: SHIPPED | OUTPATIENT
Start: 2025-01-13 | End: 2026-01-13

## 2025-01-13 NOTE — PROGRESS NOTES
Shira L Good, NP   [facility]  [address] [address]      PATIENT NAME: Tova Stack  : 1970  DATE: 25  MRN: [id number]        History of Present Illness / Problem Focused Workflow     Tova Stack presents to the clinic with Follow-up and Knee Pain (3 month f/u for L knee pain & swelling (after PT ) )     53 yo female for follow up for left knee pain/ swelling after completing PT. Active diagnosis includes HTN, IGT, anemia, lumbago, OA hip, anxiety, tobacco abuse. Left knee remains painful. Denies any continued swelling. Completed PT. Does not feel symptoms have improved. Back pain still present. Last seen by Dr. Cid Neurosurgery 22. She states she was seen by pain management provider and states the drive was too far. She also mentions he gave her Tylenol # 3 but made her very drowsy. Did help the pain but cannot take and work. Rates pain 10/10. Feels like pain is worse in the hip area. Denies numbness/tingling/ weakness to lower extremities or loss of bowel/ bladder function. Previously seen by [facility] for evaluation of hip pain with rec to f/u with nsgy as lumbar origin.     Also c/o depression and increased anxiety with difficulty sleeping. Takes hydroxyzine and does work to help sleep.     Reports episodes of gasping for breath.     Other providers   [facility] GYN  [facility] Ophthalmology  Dr. Cid- Neurosurgery at [place] Neurosurgery  [facility]    Dr. Fischer- GI  Dr. Barton- pt reports had 1 visit           Review of Systems     Review of Systems   Constitutional: Negative.    HENT: Negative.     Eyes: Negative.    Respiratory: Negative.     Cardiovascular: Negative.    Gastrointestinal: Negative.    Endocrine: Negative.    Genitourinary: Negative.    Musculoskeletal:  Positive for arthralgias and back pain.   Skin: Negative.     Allergic/Immunologic: Negative.    Neurological: Negative.    Hematological: Negative.    Psychiatric/Behavioral:  Positive for dysphoric mood and sleep disturbance. The patient is nervous/anxious.        Medical / Social / Family History     -------------------------------------    Abnormal Pap smear of cervix    Anemia    Diabetes mellitus    Dyslipidemia, goal LDL below 70    GERD (gastroesophageal reflux disease)    Hypertension    Personal history of colonic polyps        Past Surgical History:   Procedure Laterality Date     SECTION      4 total    COLONOSCOPY  2022    TUBAL LIGATION         Social History     Socioeconomic History    Marital status: Single   Tobacco Use    Smoking status: Former     Current packs/day: 0.00     Average packs/day: 1 pack/day for 5.0 years (5.0 ttl pk-yrs)     Types: Cigarettes     Start date:      Quit date:      Years since quittin.0    Smokeless tobacco: Never   Substance and Sexual Activity    Alcohol use: Never    Drug use: Not Currently     Types: Cocaine     Comment:     Sexual activity: Yes     Partners: Male     Social Drivers of Health     Financial Resource Strain: Patient Declined (2024)    Overall Financial Resource Strain (CARDIA)     Difficulty of Paying Living Expenses: Patient declined   Food Insecurity: Patient Declined (2024)    Hunger Vital Sign     Worried About Running Out of Food in the Last Year: Patient declined     Ran Out of Food in the Last Year: Patient declined   Physical Activity: Unknown (2024)    Exercise Vital Sign     Days of Exercise per Week: Patient declined   Housing Stability: Unknown (2024)    Housing Stability Vital Sign     Unable to Pay for Housing in the Last Year: Patient declined        Family History   Problem Relation Name Age of Onset    Hypertension Mother      Diabetes Mother      Hypertension Father      Heart disease Sister      Hypertension Sister      Stroke Sister       "Cancer Maternal Grandmother      Stomach cancer Maternal Aunt      Colon cancer Maternal Uncle          Medications and Allergies     Medications  Current Outpatient Medications   Medication Instructions    amLODIPine (NORVASC) 5 mg, Oral, Daily    blood-glucose meter kit To check BG once daily, to use with insurance preferred meter    ciclopirox (PENLAC) 8 % Soln Topical (Top), Nightly    diclofenac (VOLTAREN) 50 mg, Oral, 2 times daily PRN, Take with food/ milk. Avoid other NSAIDs.    diclofenac sodium (VOLTAREN) 2 g, Topical (Top), 4 times daily    famotidine (PEPCID) 40 mg    ferrous sulfate 325 (65 FE) MG EC tablet TAKE 1 TABLET (325 MG TOTAL) BY MOUTH EVERY MONDAY, WEDNESDAY, AND FRIDAY.    hydrOXYzine pamoate (VISTARIL) 25 mg, Oral, 3 times daily PRN    ibuprofen (ADVIL,MOTRIN) 800 mg, Oral, 3 times daily PRN, Take with food/ milk. Avoid other NSAIDs.    metFORMIN (GLUCOPHAGE) 500 mg, Oral, With breakfast    ONETOUCH DELICA PLUS LANCET 33 gauge Misc One Touch Delica Plus Lancet 33 gauge to use once daily for blood sugar checks.    ONETOUCH VERIO FLEX METER Misc USE TO CHECK BLOOD SUGAR ONCE DAILY    ONETOUCH VERIO TEST STRIPS Str USE TO TEST BLOOD SUGAR ONCE DAILY    pantoprazole (PROTONIX) 40 mg, Oral, Daily    sertraline (ZOLOFT) 50 mg, Oral, Daily    valACYclovir (VALTREX) 2,000 mg, Oral, 2 times daily       Allergies  Review of patient's allergies indicates:  No Known Allergies    Physical Examination     Visit Vitals  /77   Pulse 83   Temp 98.2 °F (36.8 °C) (Oral)   Resp 20   Ht 5' 2" (1.575 m)   Wt 79.5 kg (175 lb 3.2 oz)   LMP  (LMP Unknown)   SpO2 97%   PF (!) 10 L/min   BMI 32.04 kg/m²       Physical Exam  Constitutional:       General: She is not in acute distress.     Appearance: Normal appearance. She is not ill-appearing or diaphoretic.   HENT:      Head: Normocephalic.   Cardiovascular:      Rate and Rhythm: Normal rate and regular rhythm.      Heart sounds: No murmur heard.  Pulmonary: "      Effort: Pulmonary effort is normal. No respiratory distress.      Breath sounds: Normal breath sounds. No stridor. No wheezing or rhonchi.   Musculoskeletal:      Lumbar back: Tenderness present. Decreased range of motion. Positive right straight leg raise test and positive left straight leg raise test.   Skin:     General: Skin is warm and dry.   Neurological:      Mental Status: She is alert and oriented to person, place, and time. Mental status is at baseline.      Coordination: Coordination normal.      Gait: Gait normal.   Psychiatric:         Mood and Affect: Mood normal.         Behavior: Behavior normal.         Thought Content: Thought content normal.         Judgment: Judgment normal.           Results       Assessment       ICD-10-CM ICD-9-CM   1. Depression, unspecified depression type  F32.A 311   2. Chronic pain of left knee  M25.562 719.46    G89.29 338.29   3. Insomnia, unspecified type  G47.00 780.52   4. Gasping for breath  R06.89 786.09   5. Generalized anxiety disorder  F41.1 300.02   6. Degeneration of intervertebral disc of lumbar region with discogenic back pain  M51.360 722.52   7. Lumbar spondylosis  M47.816 721.3   8. Bilateral hip pain  M25.551 719.45    M25.552    9. Primary hypertension  I10 401.9   10. Type 2 diabetes mellitus without complication, without long-term current use of insulin  E11.9 250.00   11. Vitamin D deficiency  E55.9 268.9   12. Anemia, unspecified type  D64.9 285.9   13. Dyslipidemia, goal LDL below 70  E78.5 272.4       Plan       Problem List Items Addressed This Visit          Neuro    Lumbar degenerative disc disease    Overview     MRI lumbar spine 1/19/22 FINDINGS:  For the purpose of this report, the most inferior well  developed intervertebral disc space is presumed to represent L5-S1.  Lumbar vertebrae stature is maintained and alignment is unremarkable.  No acute marrow edematous signal. The visualized thoracic cord is  unremarkable. The conus  medullaris terminates at T12. The L3-L4, L4-L5  and L5-S1 discs are partially desiccated. Disc segmental analysis is  given below:     At L1-L2, disc is unremarkable. Central canal is not stenosed. There  are no narrowings of the neuroforamen.     At L2-L3, disc is unremarkable. Bilateral facets arthropathy. There is  no significant central canal stenosis. There are no narrowings of the  neuroforamen.     At L3-L4, disc height is preserved. Bilateral degenerative hypertrophy  of the facet joints and mild ligamentum flavum thickening. There is no  significant central canal stenosis. There are no narrowings of the  neuroforamen.     At L4-L5, there is bulging of annulus fibrosis which flattens the  ventral thecal sac. There is bilateral symmetrical thickening of  ligamentum flavum and facets arthropathy. These findings combine to  cause mild central canal stenosis. There are no narrowings of the  neuroforamen.     At L5-S1, there is central disc bulge which indents the ventral thecal  sac. Bilateral facets arthropathy. Central canal is not stenosed.  There are no narrowings of the neuroforamen     IMPRESSION:     Lumbar degenerative disc disease and spondylosis level by level  discussed above.  Evaluated by Neurosurgeon in [place] at [facility] last August 2022- refer to Dr. Renay Cid note           Current Assessment & Plan     Encouraged f/u with nsgy  Previously seen by pain mgmt x 1 but provider located too far per pt and medication worked but caused drowsiness and unable to work when she takes medication  Obtain updated imaging today            Relevant Orders    X-Ray Lumbar Spine 5 View    Lumbar spondylosis    Overview     See LDD.         Relevant Orders    X-Ray Lumbar Spine 5 View       Psychiatric    Depression - Primary    Current Assessment & Plan         1/13/2025   PHQ-9 Depression Patient Health Questionnaire   Over the last two weeks how often have you been bothered by little interest or  pleasure in doing things 3   Over the last two weeks how often have you been bothered by feeling down, depressed or hopeless 2   Over the last two weeks how often have you been bothered by trouble falling or staying asleep, or sleeping too much 3   Over the last two weeks how often have you been bothered by feeling tired or having little energy 2   Over the last two weeks how often have you been bothered by a poor appetite or overeating 3   Over the last two weeks how often have you been bothered by feeling bad about yourself - or that you are a failure or have let yourself or your family down 1   Over the last two weeks how often have you been bothered by moving or speaking so slowly that other people could have noticed. 1   Over the last two weeks how often have you been bothered by thoughts that you would be better off dead, or of hurting yourself 1   If you checked off any problems, how difficult have these problems made it for you to do your work, take care of things at home or get along with other people? Very difficult        She attributes her feelings related to her health condition and feels her condition is worsening in addition to personal life/ stressors. Causing increased anxiety and sleep difficulty   Rx Sertraline 50 mg once daily  F/u 6 weeks or sooner if needed  Any worsening s/s notify provider   Any SI/HI, call 911/ report to ER          Relevant Medications    sertraline (ZOLOFT) 50 MG tablet    Generalized anxiety disorder    Current Assessment & Plan     See depression         Relevant Medications    sertraline (ZOLOFT) 50 MG tablet    hydrOXYzine pamoate (VISTARIL) 25 MG Cap       Pulmonary    Gasping for breath    Current Assessment & Plan         1/13/2025    10:29 AM   EPWORTH SLEEPINESS SCALE TOTAL SCORE    Total score 4     Endorsing gasping for breath   Referral to sleep lab for further evaluation for CHI             Cardiac/Vascular    Dyslipidemia, goal LDL below 70    Relevant Orders     CBC Auto Differential    Comprehensive Metabolic Panel    Hemoglobin A1C    Lipid Panel    Hypertension    Current Assessment & Plan     BP Readings from Last 3 Encounters:   01/13/25 120/77   11/11/24 137/77   09/20/24 130/79     Follow low sodium diet, < 2 gm/day (avoid high salty foods such as processed meats/ sausage/hsieh/ sandwich meat, chips, pickles, cheese, crackers and soft drinks/ electrolyte replacement drinks).  Avoid tobacco/ alcohol use  Educated on health benefits of at least 5 days/ week of 30 minutes moderate intensity exercise (brisk walking) and 2 or more days/ week of muscle strength activities  Daily ASA 81 mg for CV prevention  Continue current medication regimen           Relevant Medications    amLODIPine (NORVASC) 5 MG tablet    Other Relevant Orders    CBC Auto Differential    Comprehensive Metabolic Panel    Hemoglobin A1C    Lipid Panel    Vitamin D    TSH    Microalbumin/Creatinine Ratio, Urine       Oncology    Anemia    Relevant Orders    CBC Auto Differential    Hemoglobin A1C    TSH       Endocrine    Type 2 diabetes mellitus without complication, without long-term current use of insulin    Relevant Medications    metFORMIN (GLUCOPHAGE) 500 MG tablet    Other Relevant Orders    CBC Auto Differential    Comprehensive Metabolic Panel    Hemoglobin A1C    Lipid Panel    Vitamin D    TSH    Microalbumin/Creatinine Ratio, Urine    Vitamin D deficiency    Relevant Orders    Comprehensive Metabolic Panel    Hemoglobin A1C    Lipid Panel    Vitamin D    TSH       Orthopedic    Bilateral hip pain    Current Assessment & Plan     Previously seen by Orthopedic, [facility] 5/2022-- see note  Will obtain updated XR imaging of b/l hips/ back  Encouraged f/u with nsgy and ortho regarding   Seen by pain mgmt x 1 visit but pain medication caused drowsiness limiting her ability to work while taking though effective in controlling pain         Relevant Orders    X-Ray  Hip 2 or 3 views Left with Pelvis when performed (Completed)    X-Ray Hip 2 or 3 views Right with Pelvis when performed (Completed)    Chronic pain of left knee    Current Assessment & Plan     Previously engaged in PT without improvement. Referral to [facility] Orth[facility]         Relevant Orders    Ambulatory referral/consult to Orthopedics       Other    Insomnia    Current Assessment & Plan     See depression/ anxiety   Continue hydroxyzine            Future Appointments   Date Time Provider Department Center   2/13/2025 10:15 AM [facility] MAMMO [place]    2/27/2025 12:00 PM Shira Funk NP [facility] [place]   4/25/2025  9:00 AM PROVIDERS, [facility] [place]    9/23/2025  9:50 AM Faith Calderon, ANP [facility] G[place]        Follow up in about 6 weeks (around 2/24/2025) for VIRTUAL for anxiety /depression follow up and lab results .    Signature:  Shira Funk NP  [facility]  [address] [address]    Date of encounter: 1/13/25

## 2025-01-13 NOTE — ASSESSMENT & PLAN NOTE
Previously seen by Orthopedic, [facility] 5/2022-- see note  Will obtain updated XR imaging of b/l hips/ back  Encouraged f/u with nsgy and ortho regarding   Seen by pain mgmt x 1 visit but pain medication caused drowsiness limiting her ability to work while taking though effective in controlling pain   Clofazimine Counseling:  I discussed with the patient the risks of clofazimine including but not limited to skin and eye pigmentation, liver damage, nausea/vomiting, gastrointestinal bleeding and allergy.

## 2025-01-13 NOTE — PATIENT INSTRUCTIONS
Dr. Renay Cid- [address] Neurosurgery    [facility]oso - Orthopedics at [facility]     Please follow up with both providers.    [facility]ics- for left knee pain evaluation   [phone]

## 2025-01-13 NOTE — ASSESSMENT & PLAN NOTE
BP Readings from Last 3 Encounters:   01/13/25 120/77   11/11/24 137/77   09/20/24 130/79     Follow low sodium diet, < 2 gm/day (avoid high salty foods such as processed meats/ sausage/hsieh/ sandwich meat, chips, pickles, cheese, crackers and soft drinks/ electrolyte replacement drinks).  Avoid tobacco/ alcohol use  Educated on health benefits of at least 5 days/ week of 30 minutes moderate intensity exercise (brisk walking) and 2 or more days/ week of muscle strength activities  Daily ASA 81 mg for CV prevention  Continue current medication regimen

## 2025-01-13 NOTE — ASSESSMENT & PLAN NOTE
1/13/2025   PHQ-9 Depression Patient Health Questionnaire   Over the last two weeks how often have you been bothered by little interest or pleasure in doing things 3   Over the last two weeks how often have you been bothered by feeling down, depressed or hopeless 2   Over the last two weeks how often have you been bothered by trouble falling or staying asleep, or sleeping too much 3   Over the last two weeks how often have you been bothered by feeling tired or having little energy 2   Over the last two weeks how often have you been bothered by a poor appetite or overeating 3   Over the last two weeks how often have you been bothered by feeling bad about yourself - or that you are a failure or have let yourself or your family down 1   Over the last two weeks how often have you been bothered by moving or speaking so slowly that other people could have noticed. 1   Over the last two weeks how often have you been bothered by thoughts that you would be better off dead, or of hurting yourself 1   If you checked off any problems, how difficult have these problems made it for you to do your work, take care of things at home or get along with other people? Very difficult        She attributes her feelings related to her health condition and feels her condition is worsening in addition to personal life/ stressors. Causing increased anxiety and sleep difficulty   Rx Sertraline 50 mg once daily  F/u 6 weeks or sooner if needed  Any worsening s/s notify provider   Any SI/HI, call 911/ report to ER

## 2025-01-13 NOTE — ASSESSMENT & PLAN NOTE
Encouraged f/u with nsgy  Previously seen by pain mgmt x 1 but provider located too far per pt and medication worked but caused drowsiness and unable to work when she takes medication  Obtain updated imaging today

## 2025-01-13 NOTE — ASSESSMENT & PLAN NOTE
1/13/2025    10:29 AM   EPWORTH SLEEPINESS SCALE TOTAL SCORE    Total score 4     Endorsing gasping for breath   Referral to sleep lab for further evaluation for CHI

## 2025-01-14 ENCOUNTER — OFFICE VISIT (OUTPATIENT)
Dept: URGENT CARE | Facility: CLINIC | Age: 55
End: 2025-01-14
Payer: MEDICAID

## 2025-01-14 VITALS
SYSTOLIC BLOOD PRESSURE: 124 MMHG | BODY MASS INDEX: 32.76 KG/M2 | RESPIRATION RATE: 18 BRPM | OXYGEN SATURATION: 100 % | WEIGHT: 178 LBS | HEIGHT: 62 IN | HEART RATE: 77 BPM | TEMPERATURE: 98 F | DIASTOLIC BLOOD PRESSURE: 82 MMHG

## 2025-01-14 DIAGNOSIS — Z20.822 COVID-19 RULED OUT BY CLINICAL CRITERIA: ICD-10-CM

## 2025-01-14 DIAGNOSIS — J01.90 ACUTE SINUSITIS, RECURRENCE NOT SPECIFIED, UNSPECIFIED LOCATION: Primary | ICD-10-CM

## 2025-01-14 LAB
CTP QC/QA: YES
MOLECULAR STREP A: NEGATIVE
POC MOLECULAR INFLUENZA A AGN: NEGATIVE
POC MOLECULAR INFLUENZA B AGN: NEGATIVE
SARS-COV-2 RDRP RESP QL NAA+PROBE: NEGATIVE

## 2025-01-14 PROCEDURE — 87651 STREP A DNA AMP PROBE: CPT | Mod: PBBFAC

## 2025-01-14 PROCEDURE — 87635 SARS-COV-2 COVID-19 AMP PRB: CPT | Mod: PBBFAC

## 2025-01-14 PROCEDURE — 99214 OFFICE O/P EST MOD 30 MIN: CPT | Mod: S$PBB,,,

## 2025-01-14 PROCEDURE — 99215 OFFICE O/P EST HI 40 MIN: CPT | Mod: PBBFAC

## 2025-01-14 PROCEDURE — 87502 INFLUENZA DNA AMP PROBE: CPT | Mod: PBBFAC

## 2025-01-14 RX ORDER — LORATADINE 10 MG/1
10 TABLET ORAL DAILY PRN
Qty: 30 TABLET | Refills: 0 | Status: SHIPPED | OUTPATIENT
Start: 2025-01-14 | End: 2025-02-13

## 2025-01-14 RX ORDER — FLUTICASONE PROPIONATE 50 MCG
2 SPRAY, SUSPENSION (ML) NASAL DAILY
Qty: 16 G | Refills: 0 | Status: SHIPPED | OUTPATIENT
Start: 2025-01-14

## 2025-01-14 NOTE — PROGRESS NOTES
"Subjective:      Patient ID: Tova Stack is a 54 y.o. female.    Vitals:  height is 5' 2" (1.575 m) and weight is 80.7 kg (178 lb). Her temperature is 98.4 °F (36.9 °C). Her blood pressure is 124/82 and her pulse is 77. Her respiration is 18 and oxygen saturation is 100%.     Chief Complaint: Sore Throat (Pt c/o sore throat , runny nose, ear pain x 2 days /Pt states if prescribed antibiotics needs meds for yeast infection as well )    Cc as above. Pain is worse with drinking water.  She has taken OTC cough medication, no relief.    Sore Throat   This is a new problem. The current episode started in the past 7 days. The problem has been unchanged. There has been no fever. Associated symptoms include ear pain. Pertinent negatives include no congestion, coughing or shortness of breath. Treatments tried: Dayquil. The treatment provided no relief.       HENT:  Positive for ear pain and sore throat. Negative for congestion.    Respiratory:  Negative for cough and shortness of breath.       Objective:     Physical Exam   Constitutional: She is oriented to person, place, and time. She appears well-developed. She is cooperative.  Non-toxic appearance. She does not appear ill. No distress. normalawake  HENT:   Head: Normocephalic and atraumatic.   Ears:   Right Ear: Hearing, external ear and ear canal normal. A middle ear effusion is present.   Left Ear: Hearing, external ear and ear canal normal. A middle ear effusion is present.   Nose: Mucosal edema and congestion present. No rhinorrhea or nasal deformity. No epistaxis. Right sinus exhibits no maxillary sinus tenderness and no frontal sinus tenderness. Left sinus exhibits no maxillary sinus tenderness and no frontal sinus tenderness.   Mouth/Throat: Uvula is midline and mucous membranes are normal. Mucous membranes are moist. No trismus in the jaw. Normal dentition. No uvula swelling. Posterior oropharyngeal erythema present. No oropharyngeal exudate. Oropharynx is " clear.      Comments: Mild erythema  Moderate nasal turbinate (L>R), postnasal drip      Comments: Moderate nasal turbinate (L>R), postnasal drip  Eyes: Conjunctivae and lids are normal. No scleral icterus.   Neck: Trachea normal and phonation normal. Neck supple. No edema present. No erythema present. No neck rigidity present.   Cardiovascular: Normal rate, regular rhythm, normal heart sounds and normal pulses.   Pulmonary/Chest: Effort normal and breath sounds normal. No respiratory distress. She has no decreased breath sounds. She has no wheezes. She has no rhonchi.   Abdominal: Normal appearance.   Musculoskeletal: Normal range of motion.         General: No deformity. Normal range of motion.   Lymphadenopathy:     She has no cervical adenopathy.   Neurological: no focal deficit. She is alert and oriented to person, place, and time. She exhibits normal muscle tone. Coordination normal.   Skin: Skin is warm, dry, intact, not diaphoretic and not pale.   Psychiatric: Her speech is normal and behavior is normal. Mood normal.   Nursing note and vitals reviewed.      Assessment:     1. Acute sinusitis, recurrence not specified, unspecified location    2. COVID-19 ruled out by clinical criteria      Results for orders placed or performed in visit on 01/14/25   POCT Strep A, Molecular    Collection Time: 01/14/25 10:51 AM   Result Value Ref Range    Molecular Strep A, POC Negative Negative     Acceptable Yes    POCT Influenza A/B Molecular    Collection Time: 01/14/25 10:59 AM   Result Value Ref Range    POC Molecular Influenza A Ag Negative Negative    POC Molecular Influenza B Ag Negative Negative     Acceptable Yes    POCT COVID-19 Rapid Screening    Collection Time: 01/14/25 11:00 AM   Result Value Ref Range    POC Rapid COVID Negative Negative     Acceptable Yes          Plan:       Acute sinusitis, recurrence not specified, unspecified location  -     POCT Strep A,  Molecular  -     POCT Influenza A/B Molecular  -     POCT COVID-19 Rapid Screening  -     fluticasone propionate (FLONASE) 50 mcg/actuation nasal spray; 2 sprays (100 mcg total) by Each Nostril route once daily.  Dispense: 16 g; Refill: 0  -     loratadine (CLARITIN) 10 mg tablet; Take 1 tablet (10 mg total) by mouth daily as needed for Allergies.  Dispense: 30 tablet; Refill: 0    COVID-19 ruled out by clinical criteria      Rest. Drink cool fluids. Tylenol as needed for discomfort. Flonase and loratadine for nasal congestion and sinus symptoms.  Warm salt water gargling or honey and lemon twice a day for sore throat.  Return to the clinic for worsening or persisting symptoms.  ER precautions provided.

## 2025-01-14 NOTE — PATIENT INSTRUCTIONS
Rest. Drink cool fluids. Tylenol as needed for discomfort. Flonase and loratadine for nasal congestion and sinus symptoms.  Warm salt water gargling or honey and lemon twice a day for sore throat.  Return to the clinic for worsening or persisting symptoms. Go to the nearest ER for chest pain or shortness of breath.

## 2025-01-16 NOTE — PROGRESS NOTES
Please inform patient x-ray of lumbar spine shows findings of mildly slipped disc to L1-L2. Encourage follow up with neurosurgeon and/or pain management for further evaluation/ treatment.

## 2025-01-17 ENCOUNTER — TELEPHONE (OUTPATIENT)
Dept: INTERNAL MEDICINE | Facility: CLINIC | Age: 55
End: 2025-01-17
Payer: MEDICAID

## 2025-01-17 NOTE — TELEPHONE ENCOUNTER
Called no answer. Left message on voicemail to contact the clinic [phone] concerning results.

## 2025-01-17 NOTE — TELEPHONE ENCOUNTER
----- Message from Shira Funk NP sent at 1/16/2025  4:45 PM CST -----  Please inform patient x-ray of lumbar spine shows findings of mildly slipped disc to L1-L2. Encourage follow up with neurosurgeon and/or pain management for further evaluation/ treatment.

## 2025-01-19 ENCOUNTER — HOSPITAL ENCOUNTER (EMERGENCY)
Facility: HOSPITAL | Age: 55
Discharge: HOME | End: 2025-01-19
Attending: EMERGENCY MEDICINE
Payer: MEDICAID

## 2025-01-19 VITALS
WEIGHT: 178.13 LBS | DIASTOLIC BLOOD PRESSURE: 70 MMHG | HEART RATE: 81 BPM | TEMPERATURE: 98 F | OXYGEN SATURATION: 100 % | RESPIRATION RATE: 18 BRPM | BODY MASS INDEX: 32.78 KG/M2 | SYSTOLIC BLOOD PRESSURE: 124 MMHG | HEIGHT: 62 IN

## 2025-01-19 DIAGNOSIS — K04.7 DENTAL ABSCESS: Primary | ICD-10-CM

## 2025-01-19 DIAGNOSIS — K05.10 GINGIVITIS: ICD-10-CM

## 2025-01-19 PROCEDURE — 99284 EMERGENCY DEPT VISIT MOD MDM: CPT

## 2025-01-19 RX ORDER — IBUPROFEN 800 MG/1
800 TABLET ORAL 3 TIMES DAILY
Qty: 15 TABLET | Refills: 0 | Status: SHIPPED | OUTPATIENT
Start: 2025-01-19 | End: 2025-01-24

## 2025-01-19 RX ORDER — AMOXICILLIN 875 MG/1
875 TABLET, FILM COATED ORAL 2 TIMES DAILY
Qty: 14 TABLET | Refills: 0 | Status: SHIPPED | OUTPATIENT
Start: 2025-01-19

## 2025-01-19 RX ORDER — FLUCONAZOLE 150 MG/1
150 TABLET ORAL DAILY
Qty: 1 TABLET | Refills: 0 | Status: SHIPPED | OUTPATIENT
Start: 2025-01-19 | End: 2025-01-20

## 2025-01-19 RX ORDER — CHLORHEXIDINE GLUCONATE ORAL RINSE 1.2 MG/ML
15 SOLUTION DENTAL 2 TIMES DAILY
Qty: 473 ML | Refills: 0 | Status: SHIPPED | OUTPATIENT
Start: 2025-01-19 | End: 2025-02-04

## 2025-01-19 NOTE — Clinical Note
"Tova Allen" Seda was seen and treated in our emergency department on 1/19/2025.  She may return to work on 01/20/2025.       If you have any questions or concerns, please don't hesitate to call.      Low Talamantes, DO"

## 2025-01-20 DIAGNOSIS — E11.9 TYPE 2 DIABETES MELLITUS WITHOUT COMPLICATION, WITHOUT LONG-TERM CURRENT USE OF INSULIN: ICD-10-CM

## 2025-01-20 NOTE — ED PROVIDER NOTES
Encounter Date: 2025       History     Chief Complaint   Patient presents with    Dental Pain     C/o gum and right sided tooth pain x2 days. Reports history of gingivitis x1 year ago.      A 54 y.o. female patient with a history of GERD, hypertension, diabetes, anemia presents to the ED with gum dental pain. The onset is 2 days ago.  Patient states she has a history of gingivitis.  She states she used to use the mouthwash for her gingivitis.  She states she has not followed up with her dentist in about a year.      The history is provided by the patient.     Review of patient's allergies indicates:  No Known Allergies  Past Medical History:   Diagnosis Date    Abnormal Pap smear of cervix     Anemia     Diabetes mellitus     Dyslipidemia, goal LDL below 70 2023    GERD (gastroesophageal reflux disease)     Hypertension     Personal history of colonic polyps 2022     Past Surgical History:   Procedure Laterality Date     SECTION      4 total    COLONOSCOPY  2022    TUBAL LIGATION       Family History   Problem Relation Name Age of Onset    Hypertension Mother      Diabetes Mother      Hypertension Father      Heart disease Sister      Hypertension Sister      Stroke Sister      Cancer Maternal Grandmother      Stomach cancer Maternal Aunt      Colon cancer Maternal Uncle       Social History     Tobacco Use    Smoking status: Former     Current packs/day: 0.00     Average packs/day: 1 pack/day for 5.0 years (5.0 ttl pk-yrs)     Types: Cigarettes     Start date:      Quit date: 2019     Years since quittin.0    Smokeless tobacco: Never   Substance Use Topics    Alcohol use: Never    Drug use: Not Currently     Types: Cocaine     Comment:      Review of Systems   Constitutional:  Negative for chills and fever.   HENT:  Positive for dental problem.    Eyes:  Negative for visual disturbance.   Respiratory:  Negative for shortness of breath.    Cardiovascular:  Negative for  chest pain.   Gastrointestinal:  Negative for nausea and vomiting.   Genitourinary:  Negative for difficulty urinating and dysuria.   Musculoskeletal:  Negative for arthralgias.   Skin:  Negative for color change and rash.   Neurological:  Negative for weakness and headaches.   Hematological:  Does not bruise/bleed easily.   Psychiatric/Behavioral:  Negative for confusion.    All other systems reviewed and are negative.      Physical Exam     Initial Vitals [01/19/25 1717]   BP Pulse Resp Temp SpO2   124/70 81 18 98.1 °F (36.7 °C) 100 %      MAP       --         Physical Exam    Nursing note and vitals reviewed.  Constitutional: She appears well-developed and well-nourished.   HENT:   Head: Normocephalic and atraumatic.   Right Ear: External ear normal.   Left Ear: External ear normal.   Nose: Nose normal. Mouth/Throat: Oropharynx is clear and moist. Abnormal dentition. Dental abscesses and dental caries present.       Receding gum line with obvious erythema and irritation to lower jaw.    Eyes: Conjunctivae and EOM are normal.   Neck: Neck supple.   Cardiovascular:  Normal rate, regular rhythm and normal heart sounds.     Exam reveals no gallop and no friction rub.       No murmur heard.  Pulmonary/Chest: Breath sounds normal. No respiratory distress. She has no wheezes. She has no rhonchi. She has no rales.   Musculoskeletal:      Cervical back: Neck supple.     Neurological: She is alert and oriented to person, place, and time. GCS score is 15. GCS eye subscore is 4. GCS verbal subscore is 5. GCS motor subscore is 6.   Skin: Skin is warm and dry. Capillary refill takes less than 2 seconds.         ED Course   Procedures  Labs Reviewed - No data to display       Imaging Results    None          Medications - No data to display  Medical Decision Making  A 54 y.o. female patient with a history of GERD, hypertension, diabetes, anemia presents to the ED with gum dental pain. The onset is 2 days ago.  Patient states  she has a history of gingivitis.  She states she used to use the mouthwash for her gingivitis.  She states she has not followed up with her dentist in about a year.      The history is provided by the patient.        Clinical diagnosis:  Dental abscess (Primary)  Gingivitis    Patient stable for DC with ED Prescriptions     Medication Sig Dispense Start Date End Date Auth. Provider    amoxicillin (AMOXIL) 875 MG tablet Take 1 tablet (875 mg total) by mouth   2 (two) times daily. 14 tablet 1/19/2025 -- Erin Jose PA    ibuprofen (ADVIL,MOTRIN) 800 MG tablet Take 1 tablet (800 mg total) by   mouth 3 (three) times daily. for 5 days 15 tablet 1/19/2025 1/24/2025   Erin Jose PA    chlorhexidine (PERIDEX) 0.12 % solution Use as directed 15 mLs in the   mouth or throat 2 (two) times daily. for 16 days 473 mL 1/19/2025 2/4/2025   Erin Jose PA    fluconazole (DIFLUCAN) 150 MG Tab (Expires today) Take 1 tablet (150 mg   total) by mouth once daily. for 1 day 1 tablet 1/19/2025 1/20/2025 Erin Jose PA         Referrals: f/u with PCP, dentist    Strict follow-up precautions given. Patient verbalizes understanding of treatment plan and ED return precautions.       Risk  Prescription drug management.  Risk Details: Given strict ED return precautions. I have spoken with the patient and/or caregivers. I have explained the patient's condition, diagnoses and treatment plan based on the information available to me at this time. I have answered the patient's and/or caregiver's questions and addressed any concerns. The patient and/or caregivers have as good an understanding of the patient's diagnosis, condition and treatment plan as can be expected at this point. The vital signs have been stable. The patient's condition is stable and appropriate for discharge from the emergency department.      The patient will pursue further outpatient evaluation with the primary care physician or other designated or  consulting physician as outlined in the discharge instructions. The patient and/or caregivers are agreeable to this plan of care and follow-up instructions have been explained in detail. The patient and/or caregivers have received these instructions in written format and have expressed an understanding of the discharge instructions. The patient and/or caregivers are aware that any significant change in condition or worsening of symptoms should prompt an immediate return to this or the closest emergency department or a call to 911               Additional MDM:   Differential Diagnosis:   Other: The following diagnoses were also considered and will be evaluated: Dental abscess, Dental caries and Gingivitis.                                   Clinical Impression:  Final diagnoses:  [K04.7] Dental abscess (Primary)  [K05.10] Gingivitis          ED Disposition Condition    Discharge Stable          ED Prescriptions       Medication Sig Dispense Start Date End Date Auth. Provider    amoxicillin (AMOXIL) 875 MG tablet Take 1 tablet (875 mg total) by mouth 2 (two) times daily. 14 tablet 1/19/2025 -- Erin Jose PA    ibuprofen (ADVIL,MOTRIN) 800 MG tablet Take 1 tablet (800 mg total) by mouth 3 (three) times daily. for 5 days 15 tablet 1/19/2025 1/24/2025 Erin Jose PA    chlorhexidine (PERIDEX) 0.12 % solution Use as directed 15 mLs in the mouth or throat 2 (two) times daily. for 16 days 473 mL 1/19/2025 2/4/2025 Erin Jose PA    fluconazole (DIFLUCAN) 150 MG Tab (Expires today) Take 1 tablet (150 mg total) by mouth once daily. for 1 day 1 tablet 1/19/2025 1/20/2025 Erin Jose PA          Follow-up Information       Follow up With Specialties Details Why Contact Info    Shira Funk, NP Family Medicine   [address] [facility] [address] [phone]      [facility] Emergency Medicine Go to  If symptoms worsen, As needed [address]  [place] [address]  [phone]    Dentist of your choice  Call in 2 days  See dental resource sheet for contact information of local dental clinics and what insurance they accept.             Erin Jose PA  01/20/25 0145

## 2025-01-24 RX ORDER — METFORMIN HYDROCHLORIDE 500 MG/1
500 TABLET ORAL
Qty: 30 TABLET | OUTPATIENT
Start: 2025-01-24

## 2025-01-24 RX ORDER — BLOOD-GLUCOSE METER
EACH MISCELLANEOUS
Qty: 100 STRIP | Refills: 3 | Status: SHIPPED | OUTPATIENT
Start: 2025-01-24

## 2025-01-27 ENCOUNTER — TELEPHONE (OUTPATIENT)
Dept: INTERNAL MEDICINE | Facility: CLINIC | Age: 55
End: 2025-01-27
Payer: MEDICAID

## 2025-01-27 NOTE — TELEPHONE ENCOUNTER
Called no answer. Left message on voicemail to contact the clinic [phone] concerning results.     ----- Message from Shira Funk NP sent at 1/16/2025  4:45 PM CST -----  Please inform patient x-ray of lumbar spine shows findings of mildly slipped disc to L1-L2. Encourage follow up with neurosurgeon and/or pain management for further evaluation/ treatment.

## 2025-01-31 NOTE — TELEPHONE ENCOUNTER
Contacted informed of PCP message below. She voiced understanding. She stated she will get appointments.

## 2025-02-03 ENCOUNTER — TELEPHONE (OUTPATIENT)
Dept: INTERNAL MEDICINE | Facility: CLINIC | Age: 55
End: 2025-02-03
Payer: MEDICAID

## 2025-02-03 NOTE — TELEPHONE ENCOUNTER
----- Message from Edyta sent at 1/29/2025  9:33 AM CST -----  Regarding: lab  .Caller is requesting to schedule their Lab appointment prior to annual appointment.    Name of Caller:pt    Preferred Date and Time of Labs:    Date of EPP Appointment:    Where would they like the lab performed?    Would the patient rather a call back or a response via [facility]?     Best Call Back Number:[phone]    Additional Information:test results

## 2025-02-03 NOTE — TELEPHONE ENCOUNTER
Contacted informed labs are ordered .Need to come 1-2 days before scheduled appointment  02/27/25.She voiced understanding.

## 2025-02-06 ENCOUNTER — OFFICE VISIT (OUTPATIENT)
Dept: ORTHOPEDICS | Facility: CLINIC | Age: 55
End: 2025-02-06
Payer: MEDICAID

## 2025-02-06 ENCOUNTER — HOSPITAL ENCOUNTER (OUTPATIENT)
Dept: RADIOLOGY | Facility: HOSPITAL | Age: 55
Discharge: HOME | End: 2025-02-06
Payer: MEDICAID

## 2025-02-06 VITALS
HEART RATE: 78 BPM | SYSTOLIC BLOOD PRESSURE: 137 MMHG | OXYGEN SATURATION: 100 % | HEIGHT: 62 IN | DIASTOLIC BLOOD PRESSURE: 87 MMHG | BODY MASS INDEX: 32.62 KG/M2 | WEIGHT: 177.25 LBS

## 2025-02-06 DIAGNOSIS — M25.562 CHRONIC PAIN OF LEFT KNEE: ICD-10-CM

## 2025-02-06 DIAGNOSIS — G89.29 CHRONIC PAIN OF LEFT KNEE: ICD-10-CM

## 2025-02-06 DIAGNOSIS — M54.10 RADICULAR LOW BACK PAIN: Primary | ICD-10-CM

## 2025-02-06 PROCEDURE — 99214 OFFICE O/P EST MOD 30 MIN: CPT | Mod: PBBFAC,25

## 2025-02-06 PROCEDURE — 73564 X-RAY EXAM KNEE 4 OR MORE: CPT | Mod: TC,LT

## 2025-02-06 NOTE — PROGRESS NOTES
"  Subjective:    Patient ID: Tova Stack is a 54 y.o. female  who presented to [facility] for new visit.      Chief Complaint: Pain of the Left Knee    History of Present Illness:  Tova Stack  presented today with with knee pain and swelling involving the posterior left thigh and knee for the past 2 years. Pain is located mostly to the back of the knee and occasionally on medial and lateral aspect. Pain is moderate.  Quality of pain is described as sharp, burning, and shooting pain electrical pain. Pain rated 8/10 right now, 5/10 at rest, 10/10 with activity Inciting event: none known. Has a history of bulging and slipped discs in back. Pain is aggravated by walking and general activity  and improved with sitting after walking.  Patient has had no prior knee problems. Evaluation to date: plain films and PCP evaluation. Treatment to date: avoidance of activity, bracing, PT, and ice/heat. Went to PT for knees >6 weeks a year ago. Expectations for today's visit includes pain relieve.  Occupation includes . PCP is Shira Funk NP.    Knee Review of Systems:  Swelling?  yes, to left knee lasting few days  Instability?  yes, knee buckled causing a fall  Mechanical sx?  yes, reports popping. No locking  <30 min AM stiffness? yes  Limited ROM? no  Fever/Chills? no    Comorbid Conditions:  Obese  HTN  GERD /  Gastric Ulcers  CKD       Objective:      Physical Exam:    /87   Pulse 78   Ht 5' 2" (1.575 m)   Wt 80.4 kg (177 lb 4 oz)   LMP  (LMP Unknown)   SpO2 100%   BMI 32.42 kg/m²       Appearance:  antalgic  FWB  Alignment: Left: normal Right: normal   Soft tissue swelling: Left: no Right: no  Effusion: Left:  Negative Right: Negative  Erythema: Left no Right: no  Ecchymosis: Left: no Right: no  Atrophy: Left: no Right: no    Palpation:  Knee Tenderness: Left: Medial joint line, Lateral joint line, and Patella Right: None    Range of " motion:  Flexion (140): Left:  140 Right: 140  Extension (0): Left: 0 Right: 0    Strength:  Extension: Left 5/5  Pain: no     Right 5/5 Pain: no  Flexion: Left 5/5 Pain: no Right   5/5 Pain: no    Special Tests:  Ballotable Effusion:Left: Negative Right: Negative   Fluid Wave: Left: Negative Right: Negative   Crepitus: Left: Positive Right: Negative   Patellar grind test:  Left: Negative  Right: Negative  Apprehension test:  Left: Negative Right: Negative   Varus: @ 0, Left Negative Right: Negative.  @ 30, Left Negative  Right Negative   Valgus: @ 0, Left Negative Right: Negative.  @ 30, Left Negative  Right Negative  Lachman: Left: Negative Right: Negative   Ant Drawer: Left: Negative Right: Negative   Posterior Drawer: Left: Negative Right: Negative   Dial Test: Left: Not performed Right: Not performed   Arron: Left: Not performed Right: Not performed   Apley's: Left: Not performed Right: Not performed  Thessaly's: Left: Not performed Right: Not performed   Noble Compression: Left: Not performed Right: Not performed   Neena: Left: Not performed Right: Not performed     SLR is positive on the left sitting and supine at 40 degree for LBP and radiculopathy    General appearance: NAD  Peripheral pulses: normal bilaterally     Labs:  Last A1c: 6.2     Imaging:   Previous images reviewed.  X-rays ordered and performed today: yes  # of views: 4 Laterality: left  My Interpretation:  no fracture, dislocation, swelling.  Mild degenerative changes seen    Assessment:          1. Radicular low back pain          Plan:     MDM: Medical Records reviewed from PCP in referral.  DX: posterior knee pain consistent with low back radicular symptoms - new problem  Treatment Plan: Discussed with patient diagnosis and treatment recommendations. Education provided. Treatment: avoidance of aggravating activity significant modification of daily activities.  PCP can try conservative treatment to include: avoidance of aggravating  activity, significant modification of daily activities, hot/cold therapies, topical and oral medications (specifically neuropathic medications), HEP/PT/OT, and advanced imagining of spine.  If appropriate, consider consultation with Neurosurgery for evaluation of back pain with radiculopathy if conservative measure don't improve symptoms in future.  Imaging: radiological studies ordered and independently reviewed; discussed with patient; agree with radiologist interpretation.   Activity: Activity as tolerated  Medication:  No medications prescribed from this visit  RTC: PRN; call if any issues. Schedule follow-up with PCP for evaluation

## 2025-02-09 NOTE — PROGRESS NOTES
Faculty Attestation: Tova Stack  was seen in Sports Medicine Clinic. Services were furnished in a primary care sports medicine center in the outpatient department at a [facility]. Discussed with resident at the time of the visit. History of Present Illness, Physical Exam, and Assessment and Plan reviewed.  I participated in the management of the patient and was immediately available throughout the encounter. Treatment plan is reasonable and appropriate. Compliance with treatment recommendations is important.    Radiology images independently reviewed and agree with radiologist interpretation. No procedure was performed.     Pamela Weiss MD  Sports Medicine      Insurance: Medicaid  Patient Type: New patient to [facility]  Problem Type: New condition to [facility]  Condition: New Condition

## 2025-02-13 ENCOUNTER — HOSPITAL ENCOUNTER (OUTPATIENT)
Dept: RADIOLOGY | Facility: HOSPITAL | Age: 55
Discharge: HOME | End: 2025-02-13
Attending: NURSE PRACTITIONER
Payer: MEDICAID

## 2025-02-13 DIAGNOSIS — Z12.31 VISIT FOR SCREENING MAMMOGRAM: ICD-10-CM

## 2025-02-13 PROCEDURE — 77063 BREAST TOMOSYNTHESIS BI: CPT | Mod: TC

## 2025-02-13 PROCEDURE — 77067 SCR MAMMO BI INCL CAD: CPT | Mod: 26,,, | Performed by: RADIOLOGY

## 2025-02-13 PROCEDURE — 77063 BREAST TOMOSYNTHESIS BI: CPT | Mod: 26,,, | Performed by: RADIOLOGY

## 2025-02-25 ENCOUNTER — HOSPITAL ENCOUNTER (EMERGENCY)
Facility: HOSPITAL | Age: 55
Discharge: HOME | End: 2025-02-25
Attending: EMERGENCY MEDICINE
Payer: MEDICAID

## 2025-02-25 VITALS
RESPIRATION RATE: 18 BRPM | HEART RATE: 82 BPM | TEMPERATURE: 97 F | WEIGHT: 176.13 LBS | SYSTOLIC BLOOD PRESSURE: 126 MMHG | DIASTOLIC BLOOD PRESSURE: 66 MMHG | OXYGEN SATURATION: 98 % | BODY MASS INDEX: 32.22 KG/M2

## 2025-02-25 DIAGNOSIS — H81.13 BENIGN PAROXYSMAL POSITIONAL VERTIGO DUE TO BILATERAL VESTIBULAR DISORDER: Primary | ICD-10-CM

## 2025-02-25 LAB
BNP BLD-MCNC: <10 PG/ML
MAGNESIUM SERPL-MCNC: 1.9 MG/DL (ref 1.6–2.6)
OHS QRS DURATION: 80 MS
OHS QTC CALCULATION: 447 MS
PHOSPHATE SERPL-MCNC: 3.9 MG/DL (ref 2.3–4.7)
TROPONIN I SERPL-MCNC: <0.01 NG/ML (ref 0–0.04)

## 2025-02-25 PROCEDURE — 84100 ASSAY OF PHOSPHORUS: CPT

## 2025-02-25 PROCEDURE — 83880 ASSAY OF NATRIURETIC PEPTIDE: CPT

## 2025-02-25 PROCEDURE — 99284 EMERGENCY DEPT VISIT MOD MDM: CPT | Mod: 25

## 2025-02-25 PROCEDURE — 84484 ASSAY OF TROPONIN QUANT: CPT

## 2025-02-25 PROCEDURE — 93005 ELECTROCARDIOGRAM TRACING: CPT

## 2025-02-25 PROCEDURE — 83735 ASSAY OF MAGNESIUM: CPT

## 2025-02-25 RX ORDER — MECLIZINE HYDROCHLORIDE 25 MG/1
25 TABLET ORAL 3 TIMES DAILY PRN
Qty: 20 TABLET | Refills: 0 | Status: SHIPPED | OUTPATIENT
Start: 2025-02-25 | End: 2025-03-04

## 2025-02-25 NOTE — ED PROVIDER NOTES
Encounter Date: 2025       History     Chief Complaint   Patient presents with    Dizziness     PT REPORTS INTERMITTENT DIZZINESS X 2 DAYS.  DENIES CP/SOB.  NO DIZZINESS AT PRESENT.  EKG OBTAINED.       A 54 y.o. female patient with a history of anemia, DM, GERD, previous BPPV presents to the ED with 2 episodes of dizziness over the last 2 days when she laid down to go to sleep at night and intermittent right-sided headaches lasting a few seconds over the last 2 days. Denies fever, chills, changes in vision, N/V, CP, SOB, weakness. Admits fatigue, dizziness lasting 5 minutes, and headaches lasting a few seconds. No symptoms at this present moment.     The history is provided by the patient.     Review of patient's allergies indicates:  No Known Allergies  Past Medical History:   Diagnosis Date    Abnormal Pap smear of cervix     Anemia     Diabetes mellitus     Dyslipidemia, goal LDL below 70 2023    GERD (gastroesophageal reflux disease)     Hypertension     Personal history of colonic polyps 2022     Past Surgical History:   Procedure Laterality Date     SECTION      4 total    COLONOSCOPY  2022    TUBAL LIGATION       Family History   Problem Relation Name Age of Onset    Hypertension Mother      Diabetes Mother      Hypertension Father      Heart disease Sister      Hypertension Sister      Stroke Sister      Cancer Maternal Grandmother      Stomach cancer Maternal Aunt      Colon cancer Maternal Uncle       Social History[1]  Review of Systems   Constitutional:  Negative for chills and fever.   Eyes:  Negative for visual disturbance.   Respiratory:  Negative for shortness of breath.    Cardiovascular:  Negative for chest pain, palpitations and leg swelling.   Gastrointestinal:  Negative for abdominal pain, nausea and vomiting.   Genitourinary:  Negative for difficulty urinating and dysuria.   Musculoskeletal:  Negative for arthralgias.   Skin:  Negative for color change and rash.    Neurological:  Positive for dizziness and headaches. Negative for tremors, seizures, syncope, facial asymmetry, speech difficulty, weakness, light-headedness and numbness.   Hematological:  Does not bruise/bleed easily.   Psychiatric/Behavioral:  Negative for confusion.    All other systems reviewed and are negative.      Physical Exam     Initial Vitals [02/25/25 0916]   BP Pulse Resp Temp SpO2   134/75 79 18 97 °F (36.1 °C) 100 %      MAP       --         Physical Exam    Nursing note and vitals reviewed.  Constitutional: She appears well-developed and well-nourished.   HENT:   Head: Normocephalic and atraumatic.   Right Ear: External ear normal.   Left Ear: External ear normal.   Nose: Nose normal.   Eyes: Conjunctivae and EOM are normal. Pupils are equal, round, and reactive to light.   Neck: Neck supple.   Cardiovascular:  Normal rate, regular rhythm and normal heart sounds.     Exam reveals no gallop and no friction rub.       No murmur heard.  Pulmonary/Chest: Breath sounds normal. No respiratory distress. She has no wheezes. She has no rhonchi. She has no rales.   Abdominal: She exhibits no distension.   Musculoskeletal:      Cervical back: Neck supple.     Neurological: She is alert and oriented to person, place, and time. She has normal strength and normal reflexes. No cranial nerve deficit or sensory deficit. GCS score is 15. GCS eye subscore is 4. GCS verbal subscore is 5. GCS motor subscore is 6.   Skin: Skin is warm and dry. Capillary refill takes less than 2 seconds.         ED Course   Procedures  Labs Reviewed   B-TYPE NATRIURETIC PEPTIDE - Normal       Result Value    Natriuretic Peptide <10.0     MAGNESIUM - Normal    Magnesium Level 1.90     PHOSPHORUS - Normal    Phosphorus Level 3.9     TROPONIN I - Normal    Troponin-I <0.010       EKG Readings: (Independently Interpreted)   Rhythm: Normal Sinus Rhythm. Heart Rate: 80. Ectopy: No Ectopy. Conduction: Normal. ST Segments: Normal ST Segments. T  Waves: Normal. Clinical Impression: Normal Sinus Rhythm     ECG Results              EKG 12-lead (Weakness) Age > 50 (In process)        Collection Time Result Time QRS Duration [facility] QTC Calculation    02/25/25 09:16:49 02/25/25 09:49:35 80 447                     In process by Interface, Lab In [facility] (02/25/25 09:49:38)                   Narrative:    Test Reason : R53.1,    Vent. Rate :  80 BPM     Atrial Rate :  80 BPM     P-R Int : 146 ms          QRS Dur :  80 ms      QT Int : 388 ms       P-R-T Axes :  40  57  -4 degrees    QTcB Int : 447 ms    Normal sinus rhythm  Nonspecific T wave abnormality  Abnormal ECG  No previous ECGs available    Referred By: AAAREFERRAL SELF           Confirmed By:                                   Imaging Results    None          Medications - No data to display  Medical Decision Making  A 54 y.o. female patient with a history of anemia, DM, GERD, previous BPPV presents to the ED with 2 episodes of dizziness over the last 2 days when she laid down to go to sleep at night and intermittent right-sided headaches lasting a few seconds over the last 2 days. Denies fever, chills, changes in vision, N/V, CP, SOB, weakness. Admits fatigue, dizziness lasting 5 minutes, and headaches lasting a few seconds. No symptoms at this present moment.     Labs unremarkable.     Clinical impression:  Benign paroxysmal positional vertigo due to bilateral vestibular disorder (Primary)    Patient is non-toxic appearing and tolerating nutritional intake. Patient's vital signs and clinical condition are stable for DC with ED Prescriptions     Medication Sig Dispense Start Date End Date Auth. Provider    meclizine (ANTIVERT) 25 mg tablet Take 1 tablet (25 mg total) by mouth 3   (three) times daily as needed for Dizziness. 20 tablet 2/25/2025 3/4/2025   Erin Jose PA         Follow-up: PCP or Internal medicine clinic within 3 days  Referrals made: ENT    Strict follow-up precautions given. Patient  verbalizes understanding of treatment plan and ED return precautions.         Amount and/or Complexity of Data Reviewed  Labs: ordered. Decision-making details documented in ED Course.    Risk  Risk Details: Given strict ED return precautions. I have spoken with the patient and/or caregivers. I have explained the patient's condition, diagnoses and treatment plan based on the information available to me at this time. I have answered the patient's and/or caregiver's questions and addressed any concerns. The patient and/or caregivers have as good an understanding of the patient's diagnosis, condition and treatment plan as can be expected at this point. The patient's condition is stable and appropriate for discharge from the emergency department.      The patient will pursue further outpatient evaluation with the primary care physician or other designated or consulting physician as outlined in the discharge instructions. The patient and/or caregivers are agreeable to this plan of care and follow-up instructions have been explained in detail. The patient and/or caregivers have received these instructions in written format and have expressed an understanding of the discharge instructions. The patient and/or caregivers are aware that any significant change in condition or worsening of symptoms should prompt an immediate return to this or the closest emergency department or a call to 911.               Additional MDM:   Differential Diagnosis:   Other: The following diagnoses were also considered and will be evaluated: BPPV, ACS and Sinusitis.            ED Course as of 02/25/25 1049 Tue Feb 25, 2025   1002 Orthostatic vital signs show a 15/9 mmhg drop [AG]   1004 BP(!): 140/80 [AG]   1004 BP: 125/71 [AG]   1004 CBC and CMP from this morning unremarkable.  [AG]   1038 Magnesium : 1.90 [AG]   1038 Troponin I: <0.010 [AG]   1038 BNP: <10.0 [AG]   1038 Phosphorus Level: 3.9 [AG]      ED Course User Index  [AG] Rbeeca  DALE Garces                           Clinical Impression:  Final diagnoses:  [H81.13] Benign paroxysmal positional vertigo due to bilateral vestibular disorder (Primary)          ED Disposition Condition    Discharge Stable          ED Prescriptions       Medication Sig Dispense Start Date End Date Auth. Provider    meclizine (ANTIVERT) 25 mg tablet Take 1 tablet (25 mg total) by mouth 3 (three) times daily as needed for Dizziness. 20 tablet 2025 3/4/2025 Erin Jose PA          Follow-up Information       Follow up With Specialties Details Why Contact Info    [facility] - Emergency Dept Emergency Medicine Go to  If symptoms worsen, As needed [address] [facility] [address] [phone]    Shira Funk, NP Family Medicine In 3 days  [address] [facility] [place] [address]  [phone]                 [1]   Social History  Tobacco Use    Smoking status: Former     Current packs/day: 0.00     Average packs/day: 1 pack/day for 5.0 years (5.0 ttl pk-yrs)     Types: Cigarettes     Start date:      Quit date: 2019     Years since quittin.1    Smokeless tobacco: Never   Substance Use Topics    Alcohol use: Never    Drug use: Not Currently     Types: Cocaine     Comment:         Erin Jose PA  25 [phone]

## 2025-02-25 NOTE — DISCHARGE INSTRUCTIONS
Search Gema Foster Vertigo Exercise video to help with dizziness symptoms.       It is important that you follow up with your primary care provider or specialist if indicated for further evaluation, workup, and treatment as necessary. The exam and treatment you received in Emergency Department was for an urgent problem and NOT INTENDED AS COMPLETE CARE. It is important that you FOLLOW UP with a doctor for ongoing care. If your symptoms become WORSE or you DO NOT IMPROVE and you are unable to reach your health care provider, you should RETURN to the Emergency Department. The Emergency Department provider has provided a PRELIMINARY INTERPRETATION of all your studies. A final interpretation may be done after you are discharged. If a change in your diagnosis or treatment is needed WE WILL CONTACT YOU. It is critical that we have a CURRENT PHONE NUMBER FOR YOU.

## 2025-02-27 ENCOUNTER — OFFICE VISIT (OUTPATIENT)
Dept: INTERNAL MEDICINE | Facility: CLINIC | Age: 55
End: 2025-02-27
Payer: MEDICAID

## 2025-02-27 DIAGNOSIS — F41.1 GENERALIZED ANXIETY DISORDER: ICD-10-CM

## 2025-02-27 DIAGNOSIS — G47.00 INSOMNIA, UNSPECIFIED TYPE: ICD-10-CM

## 2025-02-27 DIAGNOSIS — E11.9 TYPE 2 DIABETES MELLITUS WITHOUT COMPLICATION, WITHOUT LONG-TERM CURRENT USE OF INSULIN: ICD-10-CM

## 2025-02-27 DIAGNOSIS — E78.5 DYSLIPIDEMIA, GOAL LDL BELOW 70: ICD-10-CM

## 2025-02-27 DIAGNOSIS — E55.9 VITAMIN D DEFICIENCY: ICD-10-CM

## 2025-02-27 DIAGNOSIS — R42 VERTIGO: Primary | ICD-10-CM

## 2025-02-27 DIAGNOSIS — F32.A DEPRESSION, UNSPECIFIED DEPRESSION TYPE: ICD-10-CM

## 2025-02-27 DIAGNOSIS — R06.89 GASPING FOR BREATH: ICD-10-CM

## 2025-02-27 RX ORDER — ATORVASTATIN CALCIUM 10 MG/1
10 TABLET, FILM COATED ORAL NIGHTLY
Qty: 90 TABLET | Refills: 1 | Status: SHIPPED | OUTPATIENT
Start: 2025-02-27 | End: 2026-02-27

## 2025-02-27 NOTE — PROGRESS NOTES
"The patient location is: home  The chief complaint leading to consultation is: results, anxiety /depression f/u     Visit type: audiovisual    Face to Face time with patient: 11 minutes   23 minutes of total time spent on the encounter, which includes face to face time and non-face to face time preparing to see the patient (eg, review of tests), Obtaining and/or reviewing separately obtained history, Documenting clinical information in the electronic or other health record, Independently interpreting results (not separately reported) and communicating results to the patient/family/caregiver, or Care coordination (not separately reported).         Each patient to whom he or she provides medical services by telemedicine is:  (1) informed of the relationship between the physician and patient and the respective role of any other health care provider with respect to management of the patient; and (2) notified that he or she may decline to receive medical services by telemedicine and may withdraw from such care at any time.    Notes:   Pt for lab review and anxiety/ depression. She admits she is not taking sertraline and hydroxyzine regularly. She states she knows she should probably take every day. Not sleeping and will try to take medicine more often.  She reports being seen in ER on Monday for dizziness episodes and had "bloodshot eye" on Sunday and Monday. She states she got dizzy while lying down. H as upcoming ENT appointment in 2 weeks. She has taken Meclizine with relief.   Labs reviewed. . Denies prior statin. A1c 6.2%. Stable anemia/ renal functions. Otherwise denies any other concerns or complaints.    Other providers   [facility] G[facility]  [facility]  Dr. Cid- Neurosurgery at [place], [facility] Neurosurgery  [facility]    Dr. Fischer- GI  Dr. Barton- pt reports had 1 visit    Medication List with Changes/Refills   New Medications    ATORVASTATIN (LIPITOR) " 10 MG TABLET    Take 1 tablet (10 mg total) by mouth every evening.   Current Medications    AMLODIPINE (NORVASC) 5 MG TABLET    Take 1 tablet (5 mg total) by mouth once daily.    BLOOD SUGAR DIAGNOSTIC (ONETOUCH VERIO TEST STRIPS) STRP    USE TO TEST BLOOD SUGAR ONCE DAILY    BLOOD-GLUCOSE METER KIT    To check BG once daily, to use with insurance preferred meter    CICLOPIROX (PENLAC) 8 % SOLN    Apply topically nightly.    DICLOFENAC (VOLTAREN) 50 MG EC TABLET    Take 1 tablet (50 mg total) by mouth 2 (two) times daily as needed (pain). Take with food/ milk. Avoid other NSAIDs.    DICLOFENAC SODIUM (VOLTAREN) 1 % GEL    Apply 2 g topically 4 (four) times daily.    FAMOTIDINE (PEPCID) 40 MG TABLET    Take 40 mg by mouth.    FERROUS SULFATE 325 (65 FE) MG EC TABLET    TAKE 1 TABLET (325 MG TOTAL) BY MOUTH EVERY MONDAY, WEDNESDAY, AND FRIDAY.    FLUTICASONE PROPIONATE (FLONASE) 50 MCG/ACTUATION NASAL SPRAY    2 sprays (100 mcg total) by Each Nostril route once daily.    HYDROXYZINE PAMOATE (VISTARIL) 25 MG CAP    Take 1 capsule (25 mg total) by mouth 3 (three) times daily as needed (anxiety).    LORATADINE (CLARITIN) 10 MG TABLET    Take 1 tablet (10 mg total) by mouth daily as needed for Allergies.    MECLIZINE (ANTIVERT) 25 MG TABLET    Take 1 tablet (25 mg total) by mouth 3 (three) times daily as needed for Dizziness.    METFORMIN (GLUCOPHAGE) 500 MG TABLET    Take 1 tablet (500 mg total) by mouth daily with breakfast.    ONETOUCH DELICA PLUS LANCET 33 GAUGE MISC    One Touch Delica Plus Lancet 33 gauge to use once daily for blood sugar checks.    ONETOUCH VERIO FLEX METER MISC        PANTOPRAZOLE (PROTONIX) 40 MG TABLET    Take 1 tablet (40 mg total) by mouth once daily.    SERTRALINE (ZOLOFT) 50 MG TABLET    Take 1 tablet (50 mg total) by mouth once daily.    VALACYCLOVIR (VALTREX) 1000 MG TABLET    Take 2 tablets (2,000 mg total) by mouth 2 (two) times daily. for 1 day   Discontinued Medications    AMOXICILLIN  "(AMOXIL) 875 MG TABLET    Take 1 tablet (875 mg total) by mouth 2 (two) times daily.     Problem List Items Addressed This Visit          Psychiatric    Depression    Current Assessment & Plan   Denies worsening condition. Mildly improved but not taking medication regularly. She plans to start taking medication regularly.   Any worsening symptoms, notify provider   Any SI/HI call 911/ report to ER           Generalized anxiety disorder    Current Assessment & Plan   See depression.             ENT    Vertigo - Primary    Current Assessment & Plan   Reports h/o vertigo. Recent ER visit for dizziness. Rx Meclizine, taking with relief. Also has upcoming ENT appointment 3/13/25. Admits has not been sleeping well. Encouraged adequate sleep, hydration/ nutrition, compliance with medications. If symptoms do not improve, possible imaging needed             Pulmonary    Gasping for breath    Current Assessment & Plan   Re-ordering sleep study referral. Do not see completed.          Relevant Orders    Ambulatory referral/consult to Sleep Disorders    Polysomnogram (CPAP will be added if patient meets diagnostic criteria.)       Cardiac/Vascular    Dyslipidemia, goal LDL below 70    Current Assessment & Plan   Lab Results   Component Value Date    CHOL 193 02/25/2025     Lab Results   Component Value Date    HDL 45 02/25/2025     No results found for: "LDLCALC"  Lab Results   Component Value Date    TRIG 152 (H) 02/25/2025     Lab Results   Component Value Date    .00 02/25/2025       Avoid tobacco/ alcohol  Follow low fat/low cholesterol diet such as avoid/ decrease hsieh, sausage, fried foods, cookies, cakes, chips, cheese, whole milk, butter, mayonnaise. Add olive oil, avocados, lean meats, fresh fruits/ vegetables, heart healthy nuts to diet.  Educated on health benefits of exercise 5 days/ week of at least 30 minutes moderate intensity exercise (brisk walking) and 2 days/ week of muscle strength activities  Not " interested in statin therapy at this time  Encouraged lifestyle modifications   Begin atorvastatin 10 mg once daily qhs , f/u 2 mo with flabs         Relevant Medications    atorvastatin (LIPITOR) 10 MG tablet    Other Relevant Orders    Lipid Panel       Endocrine    Type 2 diabetes mellitus without complication, without long-term current use of insulin    Current Assessment & Plan   Lab Results   Component Value Date    HGBA1C 6.2 02/25/2025       Hypoglycemia episodes: denies  Microalbumin:   Lab Results   Component Value Date    MICALBCREAT  02/25/2025      Comment:      Unable to Calculate       Educated on ADA diet: eliminate/decrease high carbohydrate foods (rice, pasta, bread, potatoes (french fries, chips), candy, sweets, fruit juices, canned fruit, junk food, crackers, carbonated beverages)  Educated on health benefits of at least 5 days/ week of 30 minutes moderate intensity exercise (brisk walking) and 2 or more days/ week of muscle strength activities  Avoid alcohol or tobacco use  Eye exam: 1/23/24 no DR ; est with [facility] eye clinic- appointment April 2025  Foot exam: 12/12/23, due for next clinic visit   Continue Metformin 500 mg daily                Relevant Medications    atorvastatin (LIPITOR) 10 MG tablet    Vitamin D deficiency    Current Assessment & Plan   Lab Results   Component Value Date    [id number] 35 02/25/2025     Educated on increasing foods high in Vitamin D such as mushrooms, fish oil, cod liver, salmon, tuna, egg yolks, milk fortified with Vitamin D and foods fortified with Vitamin D such as cereals and oatmeal.  Daily supplementation of Vitamin D 2000 IU daily in addition to Calcium supplementation 1000 mg- 1200 mg daily.                Other    Insomnia    Current Assessment & Plan   Pt reports non compliance with hydroxyzine. Re- referred to sleep lab           Follow up in about 2 months (around 4/27/2025) for HLD with fasting labs before visit .

## 2025-02-27 NOTE — ASSESSMENT & PLAN NOTE
Reports h/o vertigo. Recent ER visit for dizziness. Rx Meclizine, taking with relief. Also has upcoming ENT appointment 3/13/25. Admits has not been sleeping well. Encouraged adequate sleep, hydration/ nutrition, compliance with medications. If symptoms do not improve, possible imaging needed

## 2025-02-27 NOTE — ASSESSMENT & PLAN NOTE
Lab Results   Component Value Date    [id number] 35 02/25/2025     Educated on increasing foods high in Vitamin D such as mushrooms, fish oil, cod liver, salmon, tuna, egg yolks, milk fortified with Vitamin D and foods fortified with Vitamin D such as cereals and oatmeal.  Daily supplementation of Vitamin D 2000 IU daily in addition to Calcium supplementation 1000 mg- 1200 mg daily.

## 2025-02-27 NOTE — ASSESSMENT & PLAN NOTE
"Lab Results   Component Value Date    CHOL 193 02/25/2025     Lab Results   Component Value Date    HDL 45 02/25/2025     No results found for: "LDLCALC"  Lab Results   Component Value Date    TRIG 152 (H) 02/25/2025     Lab Results   Component Value Date    .00 02/25/2025       Avoid tobacco/ alcohol  Follow low fat/low cholesterol diet such as avoid/ decrease hsieh, sausage, fried foods, cookies, cakes, chips, cheese, whole milk, butter, mayonnaise. Add olive oil, avocados, lean meats, fresh fruits/ vegetables, heart healthy nuts to diet.  Educated on health benefits of exercise 5 days/ week of at least 30 minutes moderate intensity exercise (brisk walking) and 2 days/ week of muscle strength activities  Not interested in statin therapy at this time  Encouraged lifestyle modifications   Begin atorvastatin 10 mg once daily qhs , f/u 2 mo with flabs  "

## 2025-02-27 NOTE — ASSESSMENT & PLAN NOTE
Lab Results   Component Value Date    HGBA1C 6.2 02/25/2025       Hypoglycemia episodes: denies  Microalbumin:   Lab Results   Component Value Date    MICALBCREAT  02/25/2025      Comment:      Unable to Calculate       Educated on ADA diet: eliminate/decrease high carbohydrate foods (rice, pasta, bread, potatoes (french fries, chips), candy, sweets, fruit juices, canned fruit, junk food, crackers, carbonated beverages)  Educated on health benefits of at least 5 days/ week of 30 minutes moderate intensity exercise (brisk walking) and 2 or more days/ week of muscle strength activities  Avoid alcohol or tobacco use  Eye exam: 1/23/24 no DR ; est with [facility] eye clinic- appointment April 2025  Foot exam: 12/12/23, due for next clinic visit   Continue Metformin 500 mg daily

## 2025-02-27 NOTE — ASSESSMENT & PLAN NOTE
Denies worsening condition. Mildly improved but not taking medication regularly. She plans to start taking medication regularly.   Any worsening symptoms, notify provider   Any SI/HI call 911/ report to ER

## 2025-03-08 DIAGNOSIS — F32.A DEPRESSION, UNSPECIFIED DEPRESSION TYPE: ICD-10-CM

## 2025-03-08 DIAGNOSIS — F41.1 GENERALIZED ANXIETY DISORDER: ICD-10-CM

## 2025-03-10 RX ORDER — SERTRALINE HYDROCHLORIDE 50 MG/1
50 TABLET, FILM COATED ORAL
Qty: 30 TABLET | Refills: 1 | Status: SHIPPED | OUTPATIENT
Start: 2025-03-10

## 2025-04-25 ENCOUNTER — OFFICE VISIT (OUTPATIENT)
Dept: OPHTHALMOLOGY | Facility: CLINIC | Age: 55
End: 2025-04-25
Payer: MEDICAID

## 2025-04-25 VITALS — BODY MASS INDEX: 32.41 KG/M2 | HEIGHT: 62 IN | WEIGHT: 176.13 LBS

## 2025-04-25 DIAGNOSIS — H25.813 COMBINED FORMS OF AGE-RELATED CATARACT OF BOTH EYES: ICD-10-CM

## 2025-04-25 DIAGNOSIS — E11.9 TYPE 2 DIABETES MELLITUS WITHOUT COMPLICATION, WITHOUT LONG-TERM CURRENT USE OF INSULIN: Primary | ICD-10-CM

## 2025-04-25 PROCEDURE — 99213 OFFICE O/P EST LOW 20 MIN: CPT | Mod: PBBFAC,PN

## 2025-04-25 NOTE — LETTER
April 25, 2025      [facility]  [address] [place] [address]  Phone: [phone]       Patient: Tova Stack   YOB: 1970  Date of Visit: 04/25/2025    To Whom It May Concern:    Evens Stack  was at [facility] on 04/25/2025. The patient may return to work on 4/26/25 with no restrictions. If you have any questions or concerns, or if I can be of further assistance, please do not hesitate to contact me.    Sincerely,    Lama Sarah MD

## 2025-04-25 NOTE — PROGRESS NOTES
HPI    RTC 6 Months DFE//OCT//Optos       Last edited by Miriam Villalta on 4/25/2025  8:47 AM.            Assessment /Plan     For exam results, see Encounter Report.    There are no diagnoses linked to this encounter.      OCT RNFL 4/25/25  OD: 90 no thinning   OS: 96 no thinning     OCT macula 4/25/25  OD: PVC, no IRF/SRF   OS: PVC, no IRF/SRF     Optos 4/25/25  OD: Clear media, nerve pink and sharp, c/d 0.5, macular clear, vessels mild tortuosity, periphery with no lesions  OS: Clear media, nerve pink and sharp, c/d 0.5, macular clear, vessels tortuosity, periphery with no lesions    1) DM II w/o Retinopathy  - Last A1c 6.2%  - OCT macula and fundus photos done 4/25/25  - Annual DFE due  4/2026     2) Vitreous Syneresis, OU  - Reports episodic flashes of light in temporal visual field, come and go, both eyes, several months  - No floaters  - No holes/tears/detach  - 4/25/25: Asymptomatic     3) Combined Cataract, OU  - Not visually-significant   - Mrx provided 4/25/25    4) Physiologic cupping, OU   - No thinning on OCT RNFL 4/25/25    5) Self-reported droopy eyelid, OS  - Seen after dilation, no ptosis noted  - Patient says she notices it mostly in pictures, but unable to locate one now  - Monitor symptoms (on the mirror, not just pictures)   - Will re-assess undilated    RTC 2-3 months ptosis check OS  RTC 1 year annual DFE fundus photos

## 2025-05-14 ENCOUNTER — HOSPITAL ENCOUNTER (EMERGENCY)
Facility: HOSPITAL | Age: 55
Discharge: HOME | End: 2025-05-14
Attending: EMERGENCY MEDICINE
Payer: MEDICAID

## 2025-05-14 VITALS
SYSTOLIC BLOOD PRESSURE: 136 MMHG | DIASTOLIC BLOOD PRESSURE: 74 MMHG | TEMPERATURE: 99 F | WEIGHT: 178 LBS | HEART RATE: 84 BPM | HEIGHT: 62 IN | BODY MASS INDEX: 32.76 KG/M2 | OXYGEN SATURATION: 98 % | RESPIRATION RATE: 18 BRPM

## 2025-05-14 DIAGNOSIS — R04.0 NOSEBLEED: ICD-10-CM
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
## 2025-05-14 PROCEDURE — 99284 EMERGENCY DEPT VISIT MOD MDM: CPT | Mod: 25

## 2025-05-14 PROCEDURE — 25000003 PHARM REV CODE 250: Performed by: PHYSICIAN ASSISTANT

## 2025-05-14 RX ORDER — OXYMETAZOLINE HCL 0.05 %
1 SPRAY, NON-AEROSOL (ML) NASAL
Status: COMPLETED | OUTPATIENT
Start: 2025-05-14 | End: 2025-05-14

## 2025-05-14 RX ADMIN — OXYMETAZOLINE HYDROCHLORIDE 1 SPRAY: 0.05 SPRAY NASAL at 12:05

## 2025-05-14 NOTE — DISCHARGE INSTRUCTIONS
Use Tylenol and ibuprofen for pain and swelling.  May use ice therapy.  Follow up with PCP in 7-10 days as needed
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
## 2025-05-16 DIAGNOSIS — F32.A DEPRESSION, UNSPECIFIED DEPRESSION TYPE: ICD-10-CM

## 2025-05-16 DIAGNOSIS — F41.1 GENERALIZED ANXIETY DISORDER: ICD-10-CM

## 2025-05-19 RX ORDER — SERTRALINE HYDROCHLORIDE 50 MG/1
50 TABLET, FILM COATED ORAL
Qty: 30 TABLET | Refills: 1 | Status: SHIPPED | OUTPATIENT
Start: 2025-05-19

## 2025-05-23 ENCOUNTER — TELEPHONE (OUTPATIENT)
Dept: INTERNAL MEDICINE | Facility: CLINIC | Age: 55
End: 2025-05-23
Payer: MEDICAID

## 2025-05-23 NOTE — TELEPHONE ENCOUNTER
Copied from CRM #[id number]. Topic: General Inquiry - Return Call  >> May 19, 2025  2:17 PM Wesley wrote:  Who Called: Tova Stack    Patient is returning phone call    Who Left Message for Patient:   Does the patient know what this is regarding?:       Preferred Method of Contact: Phone Call  Patient's Preferred Phone Number on File: [phone]   Best Call Back Number, if different:  Additional Information:  returning call back

## 2025-05-23 NOTE — TELEPHONE ENCOUNTER
Called she informed me that she need to schedule a appointment .Missed appointment on 05/15/2025.I informed her that the jurgenler willcall her to schedule. She prefer  a morning appointment.

## 2025-06-02 ENCOUNTER — HOSPITAL ENCOUNTER (EMERGENCY)
Facility: HOSPITAL | Age: 55
Discharge: HOME | End: 2025-06-02
Attending: EMERGENCY MEDICINE
Payer: MEDICAID

## 2025-06-02 VITALS
BODY MASS INDEX: 31.56 KG/M2 | WEIGHT: 171.5 LBS | OXYGEN SATURATION: 99 % | TEMPERATURE: 98 F | DIASTOLIC BLOOD PRESSURE: 74 MMHG | SYSTOLIC BLOOD PRESSURE: 112 MMHG | HEIGHT: 62 IN | HEART RATE: 78 BPM | RESPIRATION RATE: 18 BRPM

## 2025-06-02 DIAGNOSIS — R42 LIGHTHEADED: ICD-10-CM

## 2025-06-02 LAB
ALBUMIN SERPL-MCNC: 3.7 G/DL (ref 3.5–5)
ALBUMIN/GLOB SERPL: 0.9 RATIO (ref 1.1–2)
ALP SERPL-CCNC: 86 UNIT/L (ref 40–150)
ALT SERPL-CCNC: 7 UNIT/L (ref 0–55)
ANION GAP SERPL CALC-SCNC: 7 MEQ/L
AST SERPL-CCNC: 12 UNIT/L (ref 11–45)
BACTERIA #/AREA URNS AUTO: ABNORMAL /HPF
BASOPHILS # BLD AUTO: 0.04 X10(3)/MCL
BASOPHILS NFR BLD AUTO: 0.8 %
BILIRUB SERPL-MCNC: 0.3 MG/DL
BILIRUB UR QL STRIP.AUTO: NEGATIVE
BUN SERPL-MCNC: 12.7 MG/DL (ref 9.8–20.1)
CALCIUM SERPL-MCNC: 9.2 MG/DL (ref 8.4–10.2)
CHLORIDE SERPL-SCNC: 106 MMOL/L (ref 98–107)
CLARITY UR: CLEAR
CO2 SERPL-SCNC: 26 MMOL/L (ref 22–29)
COLOR UR AUTO: ABNORMAL
CREAT SERPL-MCNC: 0.69 MG/DL (ref 0.55–1.02)
CREAT/UREA NIT SERPL: 18
EOSINOPHIL # BLD AUTO: 0.17 X10(3)/MCL (ref 0–0.9)
EOSINOPHIL NFR BLD AUTO: 3.4 %
ERYTHROCYTE [DISTWIDTH] IN BLOOD BY AUTOMATED COUNT: 14.2 % (ref 11.5–17)
GFR SERPLBLD CREATININE-BSD FMLA CKD-EPI: >60 ML/MIN/1.73/M2
GLOBULIN SER-MCNC: 4.1 GM/DL (ref 2.4–3.5)
GLUCOSE SERPL-MCNC: 111 MG/DL (ref 74–100)
GLUCOSE UR QL STRIP: NORMAL
HCT VFR BLD AUTO: 39 % (ref 37–47)
HGB BLD-MCNC: 12.4 G/DL (ref 12–16)
HGB UR QL STRIP: ABNORMAL
HYALINE CASTS #/AREA URNS LPF: ABNORMAL /LPF
IMM GRANULOCYTES # BLD AUTO: 0.02 X10(3)/MCL (ref 0–0.04)
IMM GRANULOCYTES NFR BLD AUTO: 0.4 %
KETONES UR QL STRIP: NEGATIVE
LEUKOCYTE ESTERASE UR QL STRIP: NEGATIVE
LYMPHOCYTES # BLD AUTO: 1.99 X10(3)/MCL (ref 0.6–4.6)
LYMPHOCYTES NFR BLD AUTO: 39.3 %
MCH RBC QN AUTO: 26.2 PG (ref 27–31)
MCHC RBC AUTO-ENTMCNC: 31.8 G/DL (ref 33–36)
MCV RBC AUTO: 82.5 FL (ref 80–94)
MONOCYTES # BLD AUTO: 0.48 X10(3)/MCL (ref 0.1–1.3)
MONOCYTES NFR BLD AUTO: 9.5 %
MUCOUS THREADS URNS QL MICRO: ABNORMAL /LPF
NEUTROPHILS # BLD AUTO: 2.36 X10(3)/MCL (ref 2.1–9.2)
NEUTROPHILS NFR BLD AUTO: 46.6 %
NITRITE UR QL STRIP: NEGATIVE
NRBC BLD AUTO-RTO: 0 %
OHS QRS DURATION: 82 MS
OHS QTC CALCULATION: 429 MS
PH UR STRIP: 5.5 [PH]
PLATELET # BLD AUTO: 391 X10(3)/MCL (ref 130–400)
PMV BLD AUTO: 10 FL (ref 7.4–10.4)
POCT GLUCOSE: 108 MG/DL (ref 70–110)
POTASSIUM SERPL-SCNC: 3.7 MMOL/L (ref 3.5–5.1)
PROT SERPL-MCNC: 7.8 GM/DL (ref 6.4–8.3)
PROT UR QL STRIP: NEGATIVE
RBC # BLD AUTO: 4.73 X10(6)/MCL (ref 4.2–5.4)
RBC #/AREA URNS AUTO: ABNORMAL /HPF
SODIUM SERPL-SCNC: 139 MMOL/L (ref 136–145)
SP GR UR STRIP.AUTO: 1.02 (ref 1–1.03)
SQUAMOUS #/AREA URNS LPF: ABNORMAL /HPF
TROPONIN I SERPL-MCNC: <0.01 NG/ML (ref 0–0.04)
UROBILINOGEN UR STRIP-ACNC: NORMAL
WBC # BLD AUTO: 5.06 X10(3)/MCL (ref 4.5–11.5)
WBC #/AREA URNS AUTO: ABNORMAL /HPF

## 2025-06-02 PROCEDURE — 99284 EMERGENCY DEPT VISIT MOD MDM: CPT | Mod: 25

## 2025-06-02 PROCEDURE — 85025 COMPLETE CBC W/AUTO DIFF WBC: CPT | Performed by: PHYSICIAN ASSISTANT

## 2025-06-02 PROCEDURE — 81001 URINALYSIS AUTO W/SCOPE: CPT | Performed by: PHYSICIAN ASSISTANT

## 2025-06-02 PROCEDURE — 93005 ELECTROCARDIOGRAM TRACING: CPT

## 2025-06-02 PROCEDURE — 82962 GLUCOSE BLOOD TEST: CPT

## 2025-06-02 PROCEDURE — 84484 ASSAY OF TROPONIN QUANT: CPT | Performed by: PHYSICIAN ASSISTANT

## 2025-06-02 PROCEDURE — 80053 COMPREHEN METABOLIC PANEL: CPT | Performed by: PHYSICIAN ASSISTANT

## 2025-06-02 PROCEDURE — 36415 COLL VENOUS BLD VENIPUNCTURE: CPT | Performed by: PHYSICIAN ASSISTANT

## 2025-06-02 NOTE — ED PROVIDER NOTES
Encounter Date: 2025       History     Chief Complaint   Patient presents with    Dizziness     Pt to ED via family. Pt is C/O dizziness when she woke up this morning and left shoulder and left back pain. She denies dizziness at this time. She thought it was her glucose level. She was not able to check her glucose because she is out of CBG supplies. She also took a benadryl last night. Denies SOB, denies CP.      54-year-old female with a history of anemia, diabetes, dyslipidemia, GERD, hypertension, presents to the emergency department with complaints of feeling dizzy this morning when she woke up.  She states she took a half of Benadryl last night, went to bed around 2:00 a.m. and woke up early for blood work.  She reports being prescribed hydroxyzine to help her sleep at night however has taken and Benadryl the last 3 nights.  Patient denies feeling lightheaded this time.  She also reports left shoulder and left back pain that began last night.  She denies stress, chest pain, vision changes.    The history is provided by the patient. No  was used.     Review of patient's allergies indicates:  No Known Allergies  Past Medical History:   Diagnosis Date    Abnormal Pap smear of cervix     Anemia     Diabetes mellitus     Dyslipidemia, goal LDL below 70 2023    GERD (gastroesophageal reflux disease)     Hypertension     Personal history of colonic polyps 2022     Past Surgical History:   Procedure Laterality Date     SECTION      4 total    COLONOSCOPY  2022    TUBAL LIGATION       Family History   Problem Relation Name Age of Onset    Hypertension Mother Abbey Dickinson     Diabetes Mother Abbey Dickinson     Aortic stenosis Mother Abbey Dickinson     Hypertension Father      Heart disease Sister      Hypertension Sister      Stroke Sister      Cancer Maternal Grandmother      Stomach cancer Maternal Aunt      Colon cancer Maternal Uncle       Social History[1]  Review of Systems    Musculoskeletal:  Positive for back pain.        Left shoulder     Neurological:  Positive for light-headedness.       Physical Exam     Initial Vitals [06/02/25 0809]   BP Pulse Resp Temp SpO2   133/84 75 20 97.9 °F (36.6 °C) 100 %      MAP       --         Physical Exam    Nursing note and vitals reviewed.  Constitutional: She appears well-developed and well-nourished.   Eyes: Conjunctivae and EOM are normal. Pupils are equal, round, and reactive to light.   Cardiovascular:  Normal rate, regular rhythm, normal heart sounds and intact distal pulses.           Pulmonary/Chest: Breath sounds normal.   Abdominal: Abdomen is soft. Bowel sounds are normal. There is no abdominal tenderness. There is no rebound and no guarding.   Musculoskeletal:         General: Normal range of motion.      Cervical back: No bony tenderness.      Thoracic back: No bony tenderness.      Lumbar back: No bony tenderness.     Neurological: She is alert and oriented to person, place, and time. GCS score is 15. GCS eye subscore is 4. GCS verbal subscore is 5. GCS motor subscore is 6.   Skin: Skin is warm. Capillary refill takes less than 2 seconds.         ED Course   Procedures  Labs Reviewed   COMPREHENSIVE METABOLIC PANEL - Abnormal       Result Value    Sodium 139      Potassium 3.7      Chloride 106      CO2 26      Glucose 111 (*)     Blood Urea Nitrogen 12.7      Creatinine 0.69      Calcium 9.2      Protein Total 7.8      Albumin 3.7      Globulin 4.1 (*)     Albumin/Globulin Ratio 0.9 (*)     Bilirubin Total 0.3      ALP 86      ALT 7      AST 12      eGFR >60      Anion Gap 7.0      BUN/Creatinine Ratio 18     URINALYSIS, REFLEX TO URINE CULTURE - Abnormal    Color, UA Light-Yellow      Appearance, UA Clear      Specific Gravity, UA 1.017      pH, UA 5.5      Protein, UA Negative      Glucose, UA Normal      Ketones, UA Negative      Blood, UA 1+ (*)     Bilirubin, UA Negative      Urobilinogen, UA Normal      Nitrites, UA Negative       Leukocyte Esterase, UA Negative      RBC, UA 0-5      WBC, UA 0-5      Bacteria, UA Trace (*)     Squamous Epithelial Cells, UA Trace (*)     Mucous, UA Trace (*)     Hyaline Casts, UA None Seen     CBC WITH DIFFERENTIAL - Abnormal    WBC 5.06      RBC 4.73      Hgb 12.4      Hct 39.0      MCV 82.5      MCH 26.2 (*)     MCHC 31.8 (*)     RDW 14.2      Platelet 391      MPV 10.0      Neut % 46.6      Lymph % 39.3      Mono % 9.5      Eos % 3.4      Basophil % 0.8      Imm Grans % 0.4      Neut # 2.36      Lymph # 1.99      Mono # 0.48      Eos # 0.17      Baso # 0.04      Imm Gran # 0.02      NRBC% 0.0     TROPONIN I - Normal    Troponin-I <0.010     CBC W/ AUTO DIFFERENTIAL    Narrative:     The following orders were created for panel order CBC Auto Differential.  Procedure                               Abnormality         Status                     ---------                               -----------         ------                     CBC with Differential[[id number]]       Abnormal            Final result                 Please view results for these tests on the individual orders.   EXTRA TUBES    Narrative:     The following orders were created for panel order EXTRA TUBES.  Procedure                               Abnormality         Status                     ---------                               -----------         ------                     Light Blue Top Hold[[id number]]                                                        Gold Top Hold[[id number]]                                                                Please view results for these tests on the individual orders.   LIGHT BLUE TOP HOLD   GOLD TOP HOLD   POCT GLUCOSE    POCT Glucose 108       EKG Readings: (Independently Interpreted)   Initial Reading: No STEMI. Rhythm: Normal Sinus Rhythm. Heart Rate: 72. Ectopy: No Ectopy. Axis: Normal.     ECG Results              EKG 12-lead (In process)        Collection Time Result Time QRS Duration OHS  QTC Calculation    06/02/25 09:29:17 06/02/25 09:33:52 82 429                     In process by Interface, Lab In [facility] (06/02/25 09:34:01)                   Narrative:    Test Reason : R42,    Vent. Rate :  72 BPM     Atrial Rate :  72 BPM     P-R Int : 182 ms          QRS Dur :  82 ms      QT Int : 392 ms       P-R-T Axes :  56  67  66 degrees    QTcB Int : 429 ms    Normal sinus rhythm  Nonspecific T wave abnormality  Abnormal ECG  When compared with ECG of 25-Feb-2025 09:16,  T wave inversion no longer evident in Inferior leads  Nonspecific T wave abnormality, worse in Lateral leads    Referred By: AAAREFERRAL SELF           Confirmed By:                                   Imaging Results    None          Medications - No data to display  Medical Decision Making  54-year-old female with a history of anemia, diabetes, dyslipidemia, GERD, hypertension, presents to the emergency department with complaints of feeling dizzy this morning when she woke up.  She states she took a half of Benadryl last night, went to bed around 2:00 a.m. and woke up early for blood work.  She reports being prescribed hydroxyzine to help her sleep at night however has taken and Benadryl the last 3 nights.  Patient denies feeling lightheaded this time.  She also reports left shoulder and left back pain that began last night.  She denies stress, chest pain, vision changes.    DDx:  Dehydration, medication reaction, electrolyte abnormality    CBC, CMP, troponin and urinalysis unremarkable.  EKG shows normal sinus rhythm.  I encouraged her to drink more water and not take the Benadryl.  She will follow up with her doctor.          Amount and/or Complexity of Data Reviewed  Labs: ordered. Decision-making details documented in ED Course.  ECG/medicine tests: ordered and independent interpretation performed. Decision-making details documented in ED Course.               ED Course as of 06/02/25 1052   Mon Jun 02, 2025   0914 POCT Glucose: 108  [ER]   1033 Troponin I: <0.010 [ER]   1051 The patient is resting comfortably and in no acute distress.  She states that her symptoms have improved after treatment in Emergency Department. I personally discussed her test results and treatment plan.  Gave strict ED precautions, discussed specific conditions for return to the emergency department and importance of follow up with her primary care provider.  Patient voices understanding and agrees to the plan discussed. All patients' questions have been answered at this time.   She has remained hemodynamically stable throughout entire stay in ED and is stable for discharge home.  [ER]      ED Course User Index  [ER] Meghan Carbone PA                           Clinical Impression:  Final diagnoses:  [R42] Lightheaded          ED Disposition Condition    Discharge Stable          ED Prescriptions    None       Follow-up Information       Follow up With Specialties Details Why Contact Info    Shira Funk, NP Family Medicine Schedule an appointment as soon as possible for a visit in 2 days  [address] [facility] [address] [phone]      [facility] Emergency Dept Emergency Medicine  As needed, If symptoms worsen [facility] [address] [phone]                   [1]   Social History  Tobacco Use    Smoking status: Former     Current packs/day: 0.00     Average packs/day: 1 pack/day for 5.0 years (5.0 ttl pk-yrs)     Types: Cigarettes     Start date:      Quit date: 2025     Years since quittin.2    Smokeless tobacco: Never    Tobacco comments:     Stop smoking 2 years ago.   Substance Use Topics    Alcohol use: Never    Drug use: Not Currently     Types: Cocaine     Comment:         Meghan Carbone PA  25 [phone]

## 2025-06-03 ENCOUNTER — OFFICE VISIT (OUTPATIENT)
Dept: INTERNAL MEDICINE | Facility: CLINIC | Age: 55
End: 2025-06-03
Payer: MEDICAID

## 2025-06-03 VITALS
HEIGHT: 62 IN | RESPIRATION RATE: 20 BRPM | DIASTOLIC BLOOD PRESSURE: 76 MMHG | WEIGHT: 172.19 LBS | BODY MASS INDEX: 31.68 KG/M2 | SYSTOLIC BLOOD PRESSURE: 130 MMHG | OXYGEN SATURATION: 100 % | HEART RATE: 76 BPM

## 2025-06-03 DIAGNOSIS — G47.00 INSOMNIA, UNSPECIFIED TYPE: Primary | ICD-10-CM

## 2025-06-03 DIAGNOSIS — D64.9 ANEMIA, UNSPECIFIED TYPE: ICD-10-CM
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
## 2025-06-03 DIAGNOSIS — F41.1 GENERALIZED ANXIETY DISORDER: ICD-10-CM

## 2025-06-03 DIAGNOSIS — I10 PRIMARY HYPERTENSION: ICD-10-CM

## 2025-06-03 DIAGNOSIS — E78.5 DYSLIPIDEMIA, GOAL LDL BELOW 70: ICD-10-CM

## 2025-06-03 DIAGNOSIS — F32.A DEPRESSION, UNSPECIFIED DEPRESSION TYPE: ICD-10-CM

## 2025-06-03 DIAGNOSIS — E11.9 TYPE 2 DIABETES MELLITUS WITHOUT COMPLICATION, WITHOUT LONG-TERM CURRENT USE OF INSULIN: ICD-10-CM

## 2025-06-03 PROCEDURE — 3061F NEG MICROALBUMINURIA REV: CPT | Mod: CPTII,,, | Performed by: NURSE PRACTITIONER

## 2025-06-03 PROCEDURE — 3066F NEPHROPATHY DOC TX: CPT | Mod: CPTII,,, | Performed by: NURSE PRACTITIONER

## 2025-06-03 PROCEDURE — 3075F SYST BP GE 130 - 139MM HG: CPT | Mod: CPTII,,, | Performed by: NURSE PRACTITIONER

## 2025-06-03 PROCEDURE — 3008F BODY MASS INDEX DOCD: CPT | Mod: CPTII,,, | Performed by: NURSE PRACTITIONER

## 2025-06-03 PROCEDURE — 99214 OFFICE O/P EST MOD 30 MIN: CPT | Mod: S$PBB,,, | Performed by: NURSE PRACTITIONER

## 2025-06-03 PROCEDURE — 1159F MED LIST DOCD IN RCRD: CPT | Mod: CPTII,,, | Performed by: NURSE PRACTITIONER

## 2025-06-03 PROCEDURE — 99214 OFFICE O/P EST MOD 30 MIN: CPT | Mod: PBBFAC | Performed by: NURSE PRACTITIONER

## 2025-06-03 PROCEDURE — 1160F RVW MEDS BY RX/DR IN RCRD: CPT | Mod: CPTII,,, | Performed by: NURSE PRACTITIONER

## 2025-06-03 PROCEDURE — 3044F HG A1C LEVEL LT 7.0%: CPT | Mod: CPTII,,, | Performed by: NURSE PRACTITIONER

## 2025-06-03 PROCEDURE — 3078F DIAST BP <80 MM HG: CPT | Mod: CPTII,,, | Performed by: NURSE PRACTITIONER

## 2025-06-03 RX ORDER — HYDROXYZINE PAMOATE 25 MG/1
25 CAPSULE ORAL NIGHTLY
Start: 2025-06-03

## 2025-06-03 RX ORDER — METFORMIN HYDROCHLORIDE 500 MG/1
500 TABLET ORAL
Qty: 90 TABLET | Refills: 2 | Status: SHIPPED | OUTPATIENT
Start: 2025-06-03

## 2025-06-03 RX ORDER — ATORVASTATIN CALCIUM 10 MG/1
10 TABLET, FILM COATED ORAL NIGHTLY
Qty: 90 TABLET | Refills: 1 | Status: SHIPPED | OUTPATIENT
Start: 2025-06-03 | End: 2026-06-03

## 2025-06-03 RX ORDER — SERTRALINE HYDROCHLORIDE 50 MG/1
50 TABLET, FILM COATED ORAL DAILY
Qty: 30 TABLET | Refills: 2 | Status: SHIPPED | OUTPATIENT
Start: 2025-06-03

## 2025-06-03 RX ORDER — AMLODIPINE BESYLATE 5 MG/1
5 TABLET ORAL DAILY
Qty: 90 TABLET | Refills: 2 | Status: SHIPPED | OUTPATIENT
Start: 2025-06-03

## 2025-06-05 ENCOUNTER — TELEPHONE (OUTPATIENT)
Dept: INTERNAL MEDICINE | Facility: CLINIC | Age: 55
End: 2025-06-05
Payer: MEDICAID

## 2025-06-11 ENCOUNTER — HOSPITAL ENCOUNTER (EMERGENCY)
Facility: HOSPITAL | Age: 55
Discharge: HOME | End: 2025-06-11
Attending: INTERNAL MEDICINE
Payer: COMMERCIAL

## 2025-06-11 VITALS
OXYGEN SATURATION: 100 % | HEIGHT: 62 IN | WEIGHT: 173.19 LBS | BODY MASS INDEX: 31.87 KG/M2 | RESPIRATION RATE: 20 BRPM | HEART RATE: 77 BPM | TEMPERATURE: 97 F | DIASTOLIC BLOOD PRESSURE: 77 MMHG | SYSTOLIC BLOOD PRESSURE: 151 MMHG
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
## 2025-06-11 DIAGNOSIS — R07.81 RIB PAIN ON LEFT SIDE: ICD-10-CM

## 2025-06-11 DIAGNOSIS — S39.012A BACK STRAIN, INITIAL ENCOUNTER: Primary | ICD-10-CM

## 2025-06-11 DIAGNOSIS — M25.562 BILATERAL KNEE PAIN: ICD-10-CM

## 2025-06-11 DIAGNOSIS — M25.561 BILATERAL KNEE PAIN: ICD-10-CM

## 2025-06-11 PROCEDURE — 99284 EMERGENCY DEPT VISIT MOD MDM: CPT | Mod: 25

## 2025-06-11 RX ORDER — NAPROXEN 500 MG/1
500 TABLET ORAL 2 TIMES DAILY WITH MEALS
Qty: 30 TABLET | Refills: 0 | Status: SHIPPED | OUTPATIENT
Start: 2025-06-11 | End: 2025-06-26
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
## 2025-06-13 ENCOUNTER — TELEPHONE (OUTPATIENT)
Dept: INTERNAL MEDICINE | Facility: CLINIC | Age: 55
End: 2025-06-13
Payer: MEDICAID

## 2025-06-13 ENCOUNTER — PATIENT OUTREACH (OUTPATIENT)
Facility: OTHER | Age: 55
End: 2025-06-13
Payer: MEDICAID

## 2025-06-13 ENCOUNTER — HOSPITAL ENCOUNTER (EMERGENCY)
Facility: HOSPITAL | Age: 55
Discharge: HOME | End: 2025-06-13
Attending: EMERGENCY MEDICINE
Payer: COMMERCIAL

## 2025-06-13 VITALS
HEART RATE: 89 BPM | OXYGEN SATURATION: 100 % | DIASTOLIC BLOOD PRESSURE: 85 MMHG | TEMPERATURE: 98 F | RESPIRATION RATE: 20 BRPM | WEIGHT: 174 LBS | SYSTOLIC BLOOD PRESSURE: 135 MMHG | HEIGHT: 62 IN | BODY MASS INDEX: 32.02 KG/M2

## 2025-06-13 DIAGNOSIS — S83.92XA SPRAIN OF LEFT KNEE, UNSPECIFIED LIGAMENT, INITIAL ENCOUNTER: Primary | ICD-10-CM

## 2025-06-13 PROCEDURE — 99283 EMERGENCY DEPT VISIT LOW MDM: CPT

## 2025-06-13 RX ORDER — DICLOFENAC SODIUM 10 MG/G
2 GEL TOPICAL 2 TIMES DAILY
Qty: 200 G | Refills: 0 | Status: SHIPPED | OUTPATIENT
Start: 2025-06-13 | End: 2025-06-20

## 2025-06-13 RX ORDER — BACLOFEN 20 MG/1
20 TABLET ORAL 3 TIMES DAILY
Qty: 21 TABLET | Refills: 0 | Status: SHIPPED | OUTPATIENT
Start: 2025-06-13 | End: 2025-06-20

## 2025-06-13 NOTE — Clinical Note
"Tova Stack (Roberta) was seen and treated in our emergency department on 6/13/2025.  She may return to work on 06/16/2025.       If you have any questions or concerns, please don't hesitate to call.      ISHA RODRIGUEZ    "
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
## 2025-07-07 ENCOUNTER — HOSPITAL ENCOUNTER (EMERGENCY)
Facility: HOSPITAL | Age: 55
Discharge: HOME | End: 2025-07-07
Attending: INTERNAL MEDICINE
Payer: MEDICAID

## 2025-07-07 VITALS
RESPIRATION RATE: 17 BRPM | WEIGHT: 173.63 LBS | HEART RATE: 73 BPM | DIASTOLIC BLOOD PRESSURE: 86 MMHG | SYSTOLIC BLOOD PRESSURE: 147 MMHG | TEMPERATURE: 98 F | HEIGHT: 62 IN | BODY MASS INDEX: 31.95 KG/M2 | OXYGEN SATURATION: 99 %

## 2025-07-07 DIAGNOSIS — S56.911A FOREARM STRAIN, RIGHT, INITIAL ENCOUNTER: Primary | ICD-10-CM

## 2025-07-07 DIAGNOSIS — M79.631 RIGHT FOREARM PAIN: ICD-10-CM

## 2025-07-07 LAB — D DIMER PPP IA.FEU-MCNC: <0.27 UG/ML FEU (ref 0–0.5)

## 2025-07-07 PROCEDURE — 99284 EMERGENCY DEPT VISIT MOD MDM: CPT | Mod: 25

## 2025-07-07 PROCEDURE — 85379 FIBRIN DEGRADATION QUANT: CPT

## 2025-07-08 ENCOUNTER — LAB VISIT (OUTPATIENT)
Dept: LAB | Facility: HOSPITAL | Age: 55
End: 2025-07-08
Attending: INTERNAL MEDICINE
Payer: MEDICAID

## 2025-07-08 DIAGNOSIS — Z86.0100 HX OF COLONIC POLYPS: Primary | ICD-10-CM

## 2025-07-08 DIAGNOSIS — Z12.11 SPECIAL SCREENING FOR MALIGNANT NEOPLASMS, COLON: ICD-10-CM

## 2025-07-08 LAB
HEMOCCULT SP1 STL QL: NEGATIVE
HEMOCCULT SP2 STL QL: NEGATIVE
HEMOCCULT SP3 STL QL: NEGATIVE

## 2025-07-08 PROCEDURE — 82270 OCCULT BLOOD FECES: CPT

## 2025-07-08 NOTE — ED PROVIDER NOTES
Encounter Date: 2025       History     Chief Complaint   Patient presents with    Arm Pain     Pt states having lower right arm pain since yesterday. States her vein hurts in the arm. Denies any injury or trauma. No swelling or deformity noted     54-year-old female who presents with complaints of right forearm pain that started yesterday.  Denies injury or trauma.  Denies redness, swelling, numbness, tingling, weakness.  Denies open wound.  Denies discoloration.  Denies any other area of pain or discomfort.  She has not taken any medication for pain.  Denies any IV activity of any kind.  Denies fever, body aches, chills.     The history is provided by the patient.     Review of patient's allergies indicates:  No Known Allergies  Past Medical History:   Diagnosis Date    Abnormal Pap smear of cervix     Anemia     Diabetes mellitus     Dyslipidemia, goal LDL below 70 2023    GERD (gastroesophageal reflux disease)     Hypertension     Personal history of colonic polyps 2022     Past Surgical History:   Procedure Laterality Date     SECTION      4 total    COLONOSCOPY  2022    TUBAL LIGATION       Family History   Problem Relation Name Age of Onset    Hypertension Mother Abbey Dickinson     Diabetes Mother Abbey Dickinson     Aortic stenosis Mother Abbey Dickinson     Hypertension Father      Heart disease Sister      Hypertension Sister      Stroke Sister      Cancer Maternal Grandmother      Stomach cancer Maternal Aunt      Colon cancer Maternal Uncle       Social History[1]  Review of Systems   Constitutional:  Negative for fever.   HENT:  Negative for sore throat.    Respiratory:  Negative for shortness of breath.    Cardiovascular:  Negative for chest pain.   Gastrointestinal:  Negative for nausea.   Genitourinary:  Negative for dysuria.   Musculoskeletal:  Positive for myalgias. Negative for back pain.   Skin:  Negative for rash.   Neurological:  Negative for weakness.   Hematological:  Does not  bruise/bleed easily.       Physical Exam     Initial Vitals [07/07/25 1941]   BP Pulse Resp Temp SpO2   (!) 146/76 86 18 98.5 °F (36.9 °C) 99 %      MAP       --         Physical Exam    Nursing note and vitals reviewed.  Constitutional: She appears well-developed and well-nourished. She is not diaphoretic. No distress.   HENT:   Head: Normocephalic and atraumatic.   Right Ear: External ear normal.   Left Ear: External ear normal.   Nose: Nose normal. Mouth/Throat: Oropharynx is clear and moist.   Eyes: Conjunctivae and EOM are normal. Pupils are equal, round, and reactive to light. Right eye exhibits no discharge. Left eye exhibits no discharge.   Neck: Neck supple.   Normal range of motion.  Cardiovascular:  Normal rate.           Pulmonary/Chest: Breath sounds normal. No respiratory distress. She has no wheezes.   Abdominal: Abdomen is soft. Bowel sounds are normal. She exhibits no distension and no mass. There is no abdominal tenderness.   Musculoskeletal:         General: Normal range of motion.      Right forearm: Tenderness (mild over dorsal mid forearm) present. No swelling, edema, deformity or lacerations.      Left forearm: Normal.      Right wrist: Normal.      Left wrist: Normal.      Right hand: Normal.      Left hand: Normal.      Cervical back: Normal range of motion and neck supple.      Comments: Full strength grossly bilateral upper extremities.  Neurovascular status grossly intact.  Compartments soft.  No swelling or erythema.     Neurological: She is alert and oriented to person, place, and time. No cranial nerve deficit or sensory deficit. GCS score is 15. GCS eye subscore is 4. GCS verbal subscore is 5. GCS motor subscore is 6.   Skin: Skin is warm. Capillary refill takes less than 2 seconds.   Psychiatric: She has a normal mood and affect. Thought content normal.         ED Course   Procedures  Labs Reviewed   D DIMER, QUANTITATIVE - Normal       Result Value    D-Dimer <0.27     EXTRA TUBES     Narrative:     The following orders were created for panel order EXTRA TUBES.  Procedure                               Abnormality         Status                     ---------                               -----------         ------                     Light Green Top Hold[[id number]]                                                         Please view results for these tests on the individual orders.   LIGHT GREEN TOP HOLD          Imaging Results              X-Ray Forearm Right (Final result)  Result time 07/07/25 22:33:14      Final result by Aston Gatica MD (07/07/25 22:33:14)                   Impression:      No acute osseous abnormality identified.      Electronically signed by: Aston Gatica  Date:    07/07/2025  Time:    22:33               Narrative:    EXAMINATION:  XR FOREARM RIGHT    CLINICAL HISTORY:  Right forearm pain.    TECHNIQUE:  Two views    COMPARISON:  None available.    FINDINGS:  Articular surfaces are unremarkable and there is no intrinsic osseous abnormality.  There is no acute fracture, dislocation or arthritic change.                        Wet Read by Ryland Blanco PA-C (07/07/25 22:10:14, [facility], Emergency Medicine)    No acute abnormality.                                     Medications - No data to display  Medical Decision Making  Differential diagnosis:   Forearm strain   Forearm contusion   Carpal tunnel syndrome    Amount and/or Complexity of Data Reviewed  Radiology: ordered and independent interpretation performed.               ED Course as of 07/07/25 2315   Mon Jul 07, 2025 2210 Given strict ED return precautions. I have spoken with the patient and/or caregivers. I have explained the patient's condition, diagnoses and treatment plan based on the information available to me at this time. I have answered the patient's and/or caregiver's questions and addressed any concerns. The patient and/or caregivers have as good an understanding  of the patient's diagnosis, condition and treatment plan as can be expected at this point. The vital signs have been stable. The patient's condition is stable and appropriate for discharge from the emergency department.      The patient will pursue further outpatient evaluation with the primary care physician or other designated or consulting physician as outlined in the discharge instructions. The patient and/or caregivers are agreeable to this plan of care and follow-up instructions have been explained in detail. The patient and/or caregivers have received these instructions in written format and have expressed an understanding of the discharge instructions. The patient and/or caregivers are aware that any significant change in condition or worsening of symptoms should prompt an immediate return to this or the closest emergency department or a call to 911.    [DB]      ED Course User Index  [DB] Ryland Blanco PA-C                           Clinical Impression:  Final diagnoses:  [M79.631] Right forearm pain  [S56.911A] Forearm strain, right, initial encounter (Primary)          ED Disposition Condition    Discharge Stable          ED Prescriptions    None       Follow-up Information       Follow up With Specialties Details Why Contact Info    Shira Funk, NP Family Medicine Schedule an appointment as soon as possible for a visit   [facility] [id number] [phone]      [facility] Emergency Medicine  If symptoms worsen [address] [place] [id number] [phone]                   [1]   Social History  Tobacco Use    Smoking status: Former     Current packs/day: 0.00     Average packs/day: 1 pack/day for 5.0 years (5.0 ttl pk-yrs)     Types: Cigarettes     Start date:      Quit date: 2025     Years since quittin.3    Smokeless tobacco: Never    Tobacco comments:     Stop smoking 2 years ago.   Vaping Use    Vaping  status: Never Used   Substance Use Topics    Alcohol use: Never    Drug use: Not Currently     Types: Cocaine     Comment: 1995        Ryland Blanoc PA-C  07/07/25 [id number]

## 2025-08-07 DIAGNOSIS — E11.9 TYPE 2 DIABETES MELLITUS WITHOUT COMPLICATION, WITHOUT LONG-TERM CURRENT USE OF INSULIN: ICD-10-CM

## 2025-08-07 RX ORDER — BLOOD SUGAR DIAGNOSTIC
STRIP MISCELLANEOUS
Qty: 100 STRIP | Refills: 3 | Status: SHIPPED | OUTPATIENT
Start: 2025-08-07

## 2025-08-21 ENCOUNTER — OFFICE VISIT (OUTPATIENT)
Dept: FAMILY MEDICINE | Facility: CLINIC | Age: 55
End: 2025-08-21
Payer: MEDICAID

## 2025-08-21 DIAGNOSIS — F41.1 GAD (GENERALIZED ANXIETY DISORDER): ICD-10-CM

## 2025-08-21 DIAGNOSIS — F32.1 MODERATE MAJOR DEPRESSION: ICD-10-CM

## 2025-08-21 PROCEDURE — 3061F NEG MICROALBUMINURIA REV: CPT | Mod: CPTII,,, | Performed by: NURSE PRACTITIONER

## 2025-08-21 PROCEDURE — 1159F MED LIST DOCD IN RCRD: CPT | Mod: CPTII,,, | Performed by: NURSE PRACTITIONER

## 2025-08-21 PROCEDURE — 99213 OFFICE O/P EST LOW 20 MIN: CPT | Mod: PBBFAC | Performed by: NURSE PRACTITIONER

## 2025-08-21 PROCEDURE — 99202 OFFICE O/P NEW SF 15 MIN: CPT | Mod: S$PBB,,, | Performed by: NURSE PRACTITIONER

## 2025-08-21 PROCEDURE — 3066F NEPHROPATHY DOC TX: CPT | Mod: CPTII,,, | Performed by: NURSE PRACTITIONER

## 2025-08-21 PROCEDURE — 3044F HG A1C LEVEL LT 7.0%: CPT | Mod: CPTII,,, | Performed by: NURSE PRACTITIONER
